# Patient Record
Sex: FEMALE | Race: WHITE | Employment: UNEMPLOYED | URBAN - METROPOLITAN AREA
[De-identification: names, ages, dates, MRNs, and addresses within clinical notes are randomized per-mention and may not be internally consistent; named-entity substitution may affect disease eponyms.]

---

## 2017-11-16 PROBLEM — Z72.0 TOBACCO ABUSE: Status: ACTIVE | Noted: 2017-11-16

## 2017-11-16 PROBLEM — F19.10 IV DRUG ABUSE (HCC): Status: ACTIVE | Noted: 2017-11-16

## 2017-11-16 PROBLEM — L02.414 ABSCESS OF LEFT FOREARM: Status: ACTIVE | Noted: 2017-11-16

## 2017-11-19 PROBLEM — A49.8: Status: ACTIVE | Noted: 2017-11-19

## 2017-11-19 PROBLEM — F19.90 IVDU (INTRAVENOUS DRUG USER): Status: ACTIVE | Noted: 2017-11-16

## 2017-11-27 NOTE — TELEPHONE ENCOUNTER
Pt is calling about getting a rx for percocet. [facility]  Pharmacy number is [phone]  Please advise.

## 2017-11-28 RX ORDER — TRAMADOL HYDROCHLORIDE 50 MG/1
50 TABLET ORAL EVERY 8 HOURS PRN
Qty: 21 TABLET | Refills: 0 | Status: SHIPPED | OUTPATIENT
Start: 2017-11-28 | End: 2018-06-19

## 2017-11-30 ENCOUNTER — OFFICE VISIT (OUTPATIENT)
Dept: ORTHOPEDIC SURGERY | Age: 42
End: 2017-11-30

## 2017-11-30 VITALS — BODY MASS INDEX: 21.66 KG/M2 | HEIGHT: 67 IN | WEIGHT: 138 LBS

## 2017-11-30 DIAGNOSIS — L02.91 ABSCESS: ICD-10-CM

## 2017-11-30 DIAGNOSIS — L03.114 CELLULITIS OF LEFT UPPER EXTREMITY: Primary | ICD-10-CM

## 2017-11-30 PROCEDURE — 99024 POSTOP FOLLOW-UP VISIT: CPT | Performed by: ORTHOPAEDIC SURGERY

## 2017-12-01 NOTE — PROGRESS NOTES
palpable. Surgical cultures:   Escherichia coli     Culture Surgical 11/17/2017  2:04 PM [address] [facility]   Heavy growth    Organism  (Abnormal) 11/17/2017  2:04 PM [address] [facility]   Bacteroides stercoris     Anaerobic Culture 11/17/2017  2:04 PM [address] [facility]   Light growth   Beta Lactamase POSITIVE. Sensitivities not routinely done. Drugs of choice are: Metronidazole,   Cefoxitin, or Piperacillin/Tazobactam.     Organism  (Abnormal) 11/17/2017  2:04 PM [facility]   Prevotella oris           IMPRESSION:  2 weeks out from left forearm abscess with skin necrosis, s/p incision and drainage, and doing well. PLAN: She can go back to normal activity with no heavy impact activities for 6 weeks. Discontinue the wound vac and start wet to dry dressings daily which were taught to her today. Continue antibiotics. The patient will come back for a follow up in 2 weeks. The patient understands that there is a chance of abscess recurrence even after surgical I&D.     Steven Sampson MD

## 2017-12-21 NOTE — COMMUNICATION BODY
DIAGNOSIS:  Left forearm abscess with skin necrosis, s/p incision and drainage with wound vac. DATE OF SURGERY:  2017. HISTORY OF PRESENT ILLNESS:  Ms. Adria Nina 43 y.o.  female who came in today for 2 weeks postoperative visit. She is an IV drug abuser. Rates pain a 6/10 VAS and starting to improve. The patient denies any significant pain in the left forearm. She has been WBAT. No numbness or tingling sensation. No fever or Chills. She grew up e-coli, Bacteroides stercoris and prevotella oris and on PO Cipro. She was initially started on Bactrim and Flagyl, but cannot tolerate them d/t GI. She was given Ultram for pain and reports that she cannot take it and want something stronger. Past Medical History:   Diagnosis Date    Asthma     Depression     [facility] dependence ([facility] [address]     Drug overdose, multiple drugs 3/17/2015    Eczema of hand 2012    ETOH abuse 2011    Hepatitis C 3/1/15    Heroin withdrawal ([facility] [address]     MRSA infection 14    Suicidal ideation        Past Surgical History:   Procedure Laterality Date    ABSCESS DRAINAGE Left 2017    I&D Left forearm deep abscess, VAC application    BREAST SURGERY       SECTION         Social History     Social History    Marital status:      Spouse name: N/A    Number of children: N/A    Years of education: N/A     Occupational History    Not on file. Social History Main Topics    Smoking status: Current Every Day Smoker     Packs/day: 0.50     Types: Cigarettes    Smokeless tobacco: Never Used      Comment: quit early Nov    Alcohol use No    Drug use:      Types: IV, Marijuana      Comment: heroin    Sexual activity: Yes     Partners: Male      Comment: pt states gram/day. Other Topics Concern    Not on file     Social History Narrative    No narrative on file       History reviewed. No pertinent family history.     Current Outpatient Prescriptions on File Prior to Visit   Medication Sig Dispense Refill    ciprofloxacin (CIPRO) 500 MG tablet Take 1 tablet by mouth every 12 hours for 10 days 20 tablet 0    famotidine (PEPCID) 20 MG tablet Take 1 tablet by mouth 2 times daily 60 tablet 3    valACYclovir (VALTREX) 1 g tablet Take 1,000 mg by mouth 2 times daily      methadone (DOLOPHINE) 10 MG tablet Take 90 mg by mouth every morning .  escitalopram (LEXAPRO) 20 MG tablet Take 20 mg by mouth      ibuprofen (ADVIL;MOTRIN) 600 MG tablet Take 1 tablet by mouth every 6 hours as needed for Pain 20 tablet 0    PROAIR  (90 BASE) MCG/ACT inhaler INHALE TWO PUFFS BY MOUTH EVERY 4 HOURS AS NEEDED 1 Inhaler 11    traMADol (ULTRAM) 50 MG tablet Take 1 tablet by mouth every 8 hours as needed for Pain . 21 tablet 0    ondansetron (ZOFRAN) 4 MG tablet Take 1 tablet by mouth daily as needed for Nausea or Vomiting 15 tablet 0    gabapentin (NEURONTIN) 600 MG tablet Take 600 mg by mouth 3 times daily as needed (pain)       No current facility-administered medications on file prior to visit. Pertinent items are noted in HPI  Review of systems reviewed from Patient History Form dated on 11/30/2017 and available in the patient's chart under the Media tab. No change noted. PHYSICAL EXAMINATION:  Ms. Bela Wong is a very pleasant 43 y.o.  female who presents today in no acute distress, awake, alert, and oriented. She is well dressed, nourished and  groomed. Patient with normal affect. Height is  5' 7\" (1.702 m), weight is 138 lb (62.6 kg), Body mass index is 21.61 kg/m². Resting respiratory rate is 16. The patient walks with no limp. The incision healing well, still open with good granulation tissue. No signs of any erythema or drainage, minimal swelling. She has no pain with the active or passive range of motion of the left elbow or wrist, good ROM. She has intact sensation distally, and she is neurovascularly intact.  No axillary lymph nodes palpable. Surgical cultures:   Escherichia coli     Culture Surgical 11/17/2017  2:04 PM [address] [facility]   Heavy growth    Organism  (Abnormal) 11/17/2017  2:04 PM [address] [facility]   Bacteroides stercoris     Anaerobic Culture 11/17/2017  2:04 PM [address] [facility]   Light growth   Beta Lactamase POSITIVE. Sensitivities not routinely done. Drugs of choice are: Metronidazole,   Cefoxitin, or Piperacillin/Tazobactam.     Organism  (Abnormal) 11/17/2017  2:04 PM [facility]   Prevotella oris           IMPRESSION:  2 weeks out from left forearm abscess with skin necrosis, s/p incision and drainage, and doing well. PLAN: She can go back to normal activity with no heavy impact activities for 6 weeks. Discontinue the wound vac and start wet to dry dressings daily which were taught to her today. Continue antibiotics. The patient will come back for a follow up in 2 weeks. The patient understands that there is a chance of abscess recurrence even after surgical I&D.     Luisa Begum MD

## 2018-06-19 PROBLEM — A60.00 RECURRENT GENITAL HERPES: Status: ACTIVE | Noted: 2018-06-19

## 2018-06-19 PROBLEM — Z71.6 ENCOUNTER FOR SMOKING CESSATION COUNSELING: Status: ACTIVE | Noted: 2017-11-16

## 2018-06-25 ENCOUNTER — HOSPITAL ENCOUNTER (OUTPATIENT)
Dept: ULTRASOUND IMAGING | Age: 43
Discharge: HOME | End: 2018-06-25
Attending: INTERNAL MEDICINE | Admitting: INTERNAL MEDICINE

## 2018-06-25 DIAGNOSIS — B18.2 HEP C W/O COMA, CHRONIC (HCC): ICD-10-CM

## 2018-06-25 LAB
A/G RATIO: 1.4 (ref 1.1–2.2)
ALBUMIN SERPL-MCNC: 4.1 G/DL (ref 3.4–5)
ALP BLD-CCNC: 74 U/L (ref 40–129)
ALT SERPL-CCNC: 230 U/L (ref 10–40)
ANION GAP SERPL CALCULATED.3IONS-SCNC: 11 MMOL/L (ref 3–16)
AST SERPL-CCNC: 127 U/L (ref 15–37)
BASOPHILS ABSOLUTE: 0 K/UL (ref 0–0.2)
BASOPHILS RELATIVE PERCENT: 0.6 %
BILIRUB SERPL-MCNC: 0.3 MG/DL (ref 0–1)
BUN BLDV-MCNC: 12 MG/DL (ref 7–20)
CALCIUM SERPL-MCNC: 9.1 MG/DL (ref 8.3–10.6)
CHLORIDE BLD-SCNC: 104 MMOL/L (ref 99–110)
CHOLESTEROL, TOTAL: 116 MG/DL (ref 0–199)
CO2: 25 MMOL/L (ref 21–32)
CREAT SERPL-MCNC: 0.7 MG/DL (ref 0.6–1.1)
EOSINOPHILS ABSOLUTE: 0 K/UL (ref 0–0.6)
EOSINOPHILS RELATIVE PERCENT: 0.1 %
GFR AFRICAN AMERICAN: >60
GFR NON-AFRICAN AMERICAN: >60
GLOBULIN: 3 G/DL
GLUCOSE BLD-MCNC: 97 MG/DL (ref 70–99)
HCT VFR BLD CALC: 41.8 % (ref 36–48)
HDLC SERPL-MCNC: 48 MG/DL (ref 40–60)
HEMOGLOBIN: 14.2 G/DL (ref 12–16)
LDL CHOLESTEROL CALCULATED: 44 MG/DL
LYMPHOCYTES ABSOLUTE: 1.7 K/UL (ref 1–5.1)
LYMPHOCYTES RELATIVE PERCENT: 27.7 %
MCH RBC QN AUTO: 33 PG (ref 26–34)
MCHC RBC AUTO-ENTMCNC: 34 G/DL (ref 31–36)
MCV RBC AUTO: 96.9 FL (ref 80–100)
MONOCYTES ABSOLUTE: 0.5 K/UL (ref 0–1.3)
MONOCYTES RELATIVE PERCENT: 7.6 %
NEUTROPHILS ABSOLUTE: 3.9 K/UL (ref 1.7–7.7)
NEUTROPHILS RELATIVE PERCENT: 64 %
PDW BLD-RTO: 13.4 % (ref 12.4–15.4)
PLATELET # BLD: 146 K/UL (ref 135–450)
PMV BLD AUTO: 8.9 FL (ref 5–10.5)
POTASSIUM SERPL-SCNC: 4.4 MMOL/L (ref 3.5–5.1)
RBC # BLD: 4.31 M/UL (ref 4–5.2)
SEDIMENTATION RATE, ERYTHROCYTE: 14 MM/HR (ref 0–20)
SODIUM BLD-SCNC: 140 MMOL/L (ref 136–145)
TOTAL PROTEIN: 7.1 G/DL (ref 6.4–8.2)
TRIGL SERPL-MCNC: 119 MG/DL (ref 0–150)
TSH SERPL DL<=0.05 MIU/L-ACNC: 2.08 UIU/ML (ref 0.27–4.2)
VLDLC SERPL CALC-MCNC: 24 MG/DL
WBC # BLD: 6.1 K/UL (ref 4–11)

## 2018-06-26 LAB
HCG TUMOR MARKER: <1 IU/L (ref 0–5)
VITAMIN D 1,25-DIHYDROXY: 43.2 PG/ML (ref 19.9–79.3)

## 2018-06-27 LAB
AFP: 2.4 UG/L
EER HCV RNA QNT PCR: ABNORMAL
HCV RNA QNT REAL-TIME PCR INTERP: DETECTED
HCV RNA, QUANTITATIVE REAL TIME PCR: 7.2 LOG IU
HEPATITIS C RNA PCR QUANT: ABNORMAL IU/ML

## 2018-08-15 LAB
HBV SURFACE AB TITR SER: <3.5 MIU/ML
HEPATITIS B SURFACE ANTIGEN INTERPRETATION: NORMAL

## 2018-08-16 LAB
HIV AG/AB: NORMAL
HIV ANTIGEN: NORMAL
HIV-1 ANTIBODY: NORMAL
HIV-2 AB: NORMAL

## 2018-08-18 LAB
EER HCV RNA QNT PCR: ABNORMAL
HCV RNA QNT REAL-TIME PCR INTERP: DETECTED
HCV RNA, QUANTITATIVE REAL TIME PCR: 7 LOG IU
HEPATITIS C RNA PCR QUANT: ABNORMAL IU/ML

## 2018-08-21 LAB — HEPATITIS C GENOTYPE: NORMAL

## 2019-05-02 ENCOUNTER — APPOINTMENT (OUTPATIENT)
Dept: GENERAL RADIOLOGY | Age: 44
End: 2019-05-02
Payer: COMMERCIAL

## 2019-05-02 ENCOUNTER — HOSPITAL ENCOUNTER (EMERGENCY)
Age: 44
Discharge: HOME | End: 2019-05-02
Attending: EMERGENCY MEDICINE
Payer: COMMERCIAL

## 2019-05-02 VITALS
RESPIRATION RATE: 19 BRPM | TEMPERATURE: 98.7 F | HEART RATE: 75 BPM | DIASTOLIC BLOOD PRESSURE: 74 MMHG | OXYGEN SATURATION: 95 % | BODY MASS INDEX: 23.58 KG/M2 | WEIGHT: 150.57 LBS | SYSTOLIC BLOOD PRESSURE: 133 MMHG

## 2019-05-02 DIAGNOSIS — J18.9 PNEUMONIA DUE TO ORGANISM: Primary | ICD-10-CM

## 2019-05-02 LAB
ANION GAP SERPL CALCULATED.3IONS-SCNC: 14 MMOL/L (ref 3–16)
BASOPHILS ABSOLUTE: 0 K/UL (ref 0–0.2)
BASOPHILS RELATIVE PERCENT: 0 %
BUN BLDV-MCNC: 5 MG/DL (ref 7–20)
CALCIUM SERPL-MCNC: 9.1 MG/DL (ref 8.3–10.6)
CHLORIDE BLD-SCNC: 101 MMOL/L (ref 99–110)
CO2: 22 MMOL/L (ref 21–32)
CREAT SERPL-MCNC: 0.8 MG/DL (ref 0.6–1.1)
EKG ATRIAL RATE: 101 BPM
EKG DIAGNOSIS: NORMAL
EKG P AXIS: 69 DEGREES
EKG P-R INTERVAL: 114 MS
EKG Q-T INTERVAL: 332 MS
EKG QRS DURATION: 70 MS
EKG QTC CALCULATION (BAZETT): 430 MS
EKG R AXIS: 62 DEGREES
EKG T AXIS: 26 DEGREES
EKG VENTRICULAR RATE: 101 BPM
EOSINOPHILS ABSOLUTE: 0 K/UL (ref 0–0.6)
EOSINOPHILS RELATIVE PERCENT: 0.2 %
GFR AFRICAN AMERICAN: >60
GFR NON-AFRICAN AMERICAN: >60
GLUCOSE BLD-MCNC: 144 MG/DL (ref 70–99)
HCT VFR BLD CALC: 38.9 % (ref 36–48)
HEMOGLOBIN: 13 G/DL (ref 12–16)
LACTIC ACID: 1.3 MMOL/L (ref 0.4–2)
LYMPHOCYTES ABSOLUTE: 0.6 K/UL (ref 1–5.1)
LYMPHOCYTES RELATIVE PERCENT: 3.8 %
MCH RBC QN AUTO: 30.6 PG (ref 26–34)
MCHC RBC AUTO-ENTMCNC: 33.4 G/DL (ref 31–36)
MCV RBC AUTO: 91.6 FL (ref 80–100)
MONOCYTES ABSOLUTE: 0.7 K/UL (ref 0–1.3)
MONOCYTES RELATIVE PERCENT: 4.2 %
NEUTROPHILS ABSOLUTE: 15.2 K/UL (ref 1.7–7.7)
NEUTROPHILS RELATIVE PERCENT: 91.8 %
PDW BLD-RTO: 15 % (ref 12.4–15.4)
PLATELET # BLD: 136 K/UL (ref 135–450)
PMV BLD AUTO: 10 FL (ref 5–10.5)
POTASSIUM SERPL-SCNC: 3.8 MMOL/L (ref 3.5–5.1)
PRO-BNP: 142 PG/ML (ref 0–124)
RBC # BLD: 4.25 M/UL (ref 4–5.2)
SODIUM BLD-SCNC: 137 MMOL/L (ref 136–145)
WBC # BLD: 16.6 K/UL (ref 4–11)

## 2019-05-02 PROCEDURE — 93005 ELECTROCARDIOGRAM TRACING: CPT | Performed by: EMERGENCY MEDICINE

## 2019-05-02 PROCEDURE — 2580000003 HC RX 258: Performed by: EMERGENCY MEDICINE

## 2019-05-02 PROCEDURE — 80048 BASIC METABOLIC PNL TOTAL CA: CPT

## 2019-05-02 PROCEDURE — 96365 THER/PROPH/DIAG IV INF INIT: CPT

## 2019-05-02 PROCEDURE — 87040 BLOOD CULTURE FOR BACTERIA: CPT

## 2019-05-02 PROCEDURE — 85025 COMPLETE CBC W/AUTO DIFF WBC: CPT

## 2019-05-02 PROCEDURE — 83605 ASSAY OF LACTIC ACID: CPT

## 2019-05-02 PROCEDURE — 93010 ELECTROCARDIOGRAM REPORT: CPT | Performed by: INTERNAL MEDICINE

## 2019-05-02 PROCEDURE — 6370000000 HC RX 637 (ALT 250 FOR IP): Performed by: EMERGENCY MEDICINE

## 2019-05-02 PROCEDURE — 6360000002 HC RX W HCPCS: Performed by: EMERGENCY MEDICINE

## 2019-05-02 PROCEDURE — 83880 ASSAY OF NATRIURETIC PEPTIDE: CPT

## 2019-05-02 PROCEDURE — 94664 DEMO&/EVAL PT USE INHALER: CPT

## 2019-05-02 PROCEDURE — 99284 EMERGENCY DEPT VISIT MOD MDM: CPT

## 2019-05-02 PROCEDURE — 71046 X-RAY EXAM CHEST 2 VIEWS: CPT

## 2019-05-02 PROCEDURE — 36415 COLL VENOUS BLD VENIPUNCTURE: CPT

## 2019-05-02 PROCEDURE — 96361 HYDRATE IV INFUSION ADD-ON: CPT

## 2019-05-02 PROCEDURE — 96375 TX/PRO/DX INJ NEW DRUG ADDON: CPT

## 2019-05-02 PROCEDURE — 94640 AIRWAY INHALATION TREATMENT: CPT

## 2019-05-02 RX ORDER — IPRATROPIUM BROMIDE AND ALBUTEROL SULFATE 2.5; .5 MG/3ML; MG/3ML
1 SOLUTION RESPIRATORY (INHALATION) EVERY 4 HOURS
Qty: 360 ML | Refills: 0 | Status: SHIPPED | OUTPATIENT
Start: 2019-05-02 | End: 2019-12-04 | Stop reason: SDUPTHER

## 2019-05-02 RX ORDER — BROMPHENIRAMINE MALEATE, PSEUDOEPHEDRINE HYDROCHLORIDE, AND DEXTROMETHORPHAN HYDROBROMIDE 2; 30; 10 MG/5ML; MG/5ML; MG/5ML
5 SYRUP ORAL 4 TIMES DAILY PRN
Qty: 150 ML | Refills: 0 | Status: SHIPPED | OUTPATIENT
Start: 2019-05-02 | End: 2019-06-28

## 2019-05-02 RX ORDER — 0.9 % SODIUM CHLORIDE 0.9 %
1000 INTRAVENOUS SOLUTION INTRAVENOUS ONCE
Status: COMPLETED | OUTPATIENT
Start: 2019-05-02 | End: 2019-05-02

## 2019-05-02 RX ORDER — LEVOFLOXACIN 500 MG/1
500 TABLET, FILM COATED ORAL DAILY
Qty: 10 TABLET | Refills: 0 | Status: SHIPPED | OUTPATIENT
Start: 2019-05-02 | End: 2019-05-12

## 2019-05-02 RX ORDER — ONDANSETRON 4 MG/1
4 TABLET, ORALLY DISINTEGRATING ORAL ONCE
Status: COMPLETED | OUTPATIENT
Start: 2019-05-02 | End: 2019-05-02

## 2019-05-02 RX ORDER — PREDNISONE 20 MG/1
60 TABLET ORAL ONCE
Status: COMPLETED | OUTPATIENT
Start: 2019-05-02 | End: 2019-05-02

## 2019-05-02 RX ORDER — PREDNISONE 20 MG/1
TABLET ORAL
Qty: 27 TABLET | Refills: 0 | Status: SHIPPED | OUTPATIENT
Start: 2019-05-02 | End: 2019-06-28

## 2019-05-02 RX ORDER — ONDANSETRON 2 MG/ML
4 INJECTION INTRAMUSCULAR; INTRAVENOUS ONCE
Status: COMPLETED | OUTPATIENT
Start: 2019-05-02 | End: 2019-05-02

## 2019-05-02 RX ORDER — IPRATROPIUM BROMIDE AND ALBUTEROL SULFATE 2.5; .5 MG/3ML; MG/3ML
1 SOLUTION RESPIRATORY (INHALATION) ONCE
Status: COMPLETED | OUTPATIENT
Start: 2019-05-02 | End: 2019-05-02

## 2019-05-02 RX ORDER — LEVOFLOXACIN 5 MG/ML
500 INJECTION, SOLUTION INTRAVENOUS ONCE
Status: COMPLETED | OUTPATIENT
Start: 2019-05-02 | End: 2019-05-02

## 2019-05-02 RX ORDER — ONDANSETRON 4 MG/1
4 TABLET, ORALLY DISINTEGRATING ORAL EVERY 8 HOURS PRN
Qty: 20 TABLET | Refills: 0 | Status: SHIPPED | OUTPATIENT
Start: 2019-05-02 | End: 2019-06-28

## 2019-05-02 RX ADMIN — ONDANSETRON 4 MG: 4 TABLET, ORALLY DISINTEGRATING ORAL at 06:07

## 2019-05-02 RX ADMIN — ONDANSETRON 4 MG: 2 INJECTION INTRAMUSCULAR; INTRAVENOUS at 03:00

## 2019-05-02 RX ADMIN — PREDNISONE 60 MG: 20 TABLET ORAL at 03:13

## 2019-05-02 RX ADMIN — LEVOFLOXACIN 500 MG: 5 INJECTION, SOLUTION INTRAVENOUS at 05:54

## 2019-05-02 RX ADMIN — IPRATROPIUM BROMIDE AND ALBUTEROL SULFATE 1 AMPULE: .5; 3 SOLUTION RESPIRATORY (INHALATION) at 02:58

## 2019-05-02 RX ADMIN — IBUPROFEN 600 MG: 400 TABLET ORAL at 03:17

## 2019-05-02 RX ADMIN — SODIUM CHLORIDE 1000 ML: 9 INJECTION, SOLUTION INTRAVENOUS at 03:00

## 2019-05-02 ASSESSMENT — PAIN DESCRIPTION - FREQUENCY: FREQUENCY: CONTINUOUS

## 2019-05-02 ASSESSMENT — PAIN SCALES - GENERAL
PAINLEVEL_OUTOF10: 9
PAINLEVEL_OUTOF10: 10

## 2019-05-02 ASSESSMENT — PAIN DESCRIPTION - LOCATION: LOCATION: BACK

## 2019-05-02 ASSESSMENT — PAIN DESCRIPTION - PAIN TYPE: TYPE: ACUTE PAIN

## 2019-05-02 ASSESSMENT — PAIN DESCRIPTION - DESCRIPTORS: DESCRIPTORS: ACHING

## 2019-05-02 NOTE — ED PROVIDER NOTES
Triage Chief Complaint:    Shortness of Breath (started last night congestion, sob, runny nose)    [place]:  Vanessa Cazares is a 37 y.o. female that presents to the emergency Department with complaints of having shortness of breath, nasal congestion, and cough. The patient states that her symptoms began over the course of last 24 hours. Patient states she has been feeling increasingly sick. Patient denies back pain, or muscular weakness, but states that she has been trying to stay hydrated and finds it difficult. The patient states that she is becoming dehydrated, since she does not drink enough fluids. Patient states there is no nausea vomiting, however her appetite is markedly decreased. Patient states she feels very wheezy, and hasn't difficulty breathing, and states that her normal medications for asthma are not helpful. ROS:  At least 10 systems reviewed and otherwise acutely negative except as in the [address] Past Medical History:   Diagnosis Date    Asthma     Depression     Drug dependence ([facility] [address]     Drug overdose, multiple drugs 3/17/2015    Eczema of hand 2012    ETOH abuse 2011    Hepatitis C 3/1/15    Heroin withdrawal ([facility] [address]     MRSA infection 14    Suicidal ideation      Past Surgical History:   Procedure Laterality Date    ABSCESS DRAINAGE Left 2017    I&D Left forearm deep abscess, VAC application    BREAST SURGERY       SECTION       No family history on file.   Social History     Socioeconomic History    Marital status:      Spouse name: Not on file    Number of children: Not on file    Years of education: Not on file    Highest education level: Not on file   Occupational History    Not on file   Social Needs    Financial resource strain: Not on file    Food insecurity:     Worry: Not on file     Inability: Not on file    Transportation needs:     Medical: Not on file     Non-medical: Not on file   Tobacco Use    Smoking status: Current Every Day Smoker     Packs/day: 0.50     Types: Cigarettes    Smokeless tobacco: Never Used    Tobacco comment: quit early Nov   Substance and Sexual Activity    Alcohol use: No     Alcohol/week: 0.5 oz     Types: 1 Standard drinks or equivalent per week    Drug use: Yes     Types: IV, Marijuana     Comment: heroin    Sexual activity: Yes     Partners: Male     Comment: pt states gram/day.    Lifestyle    Physical activity:     Days per week: Not on file     Minutes per session: Not on file    Stress: Not on file   Relationships    Social connections:     Talks on phone: Not on file     Gets together: Not on file     Attends Taoism service: Not on file     Active member of club or organization: Not on file     Attends meetings of clubs or organizations: Not on file     Relationship status: Not on file    Intimate partner violence:     Fear of current or ex partner: Not on file     Emotionally abused: Not on file     Physically abused: Not on file     Forced sexual activity: Not on file   Other Topics Concern    Not on file   Social History Narrative    Not on file     Current Facility-Administered Medications   Medication Dose Route Frequency Provider Last Rate Last Dose    levofloxacin (LEVAQUIN) 500 MG/100ML infusion 500 mg  500 mg Intravenous Once Denise Liz  mL/hr at 05/02/19 0554 500 mg at 05/02/19 0554    ondansetron (ZOFRAN-ODT) disintegrating tablet 4 mg  4 mg Oral Once Denise Liz MD         Current Outpatient Medications   Medication Sig Dispense Refill    levofloxacin (LEVAQUIN) 500 MG tablet Take 1 tablet by mouth daily for 10 days 10 tablet 0    brompheniramine-pseudoephedrine-DM (BROMFED DM) 2-30-10 MG/5ML syrup Take 5 mLs by mouth 4 times daily as needed for Congestion or Cough 150 mL 0    ipratropium-albuterol (DUONEB) 0.5-2.5 (3) MG/3ML SOLN nebulizer solution Inhale 3 mLs into the lungs every 4 hours 360 mL 0    predniSONE (DELTASONE) 20 MG tablet Take midline. HEART: RRR. LUNGS: Decreased breath sounds bilaterally, with slight expiratory wheezes  ABDOMEN: Soft. Non-tender. No guarding or rebound. EXTREMITIES: No acute deformities. SKIN: Warm and dry. NEUROLOGICAL: No gross facial drooping. Moves all 4 extremities spontaneously.     Physical Exam    I have reviewed and interpreted all of the currently availablelab results from this visit (if applicable):  Results for orders placed or performed during the hospital encounter of 05/02/19   CBC Auto Differential   Result Value Ref Range    WBC 16.6 (H) 4.0 - 11.0 K/uL    RBC 4.25 4.00 - 5.20 M/uL    Hemoglobin 13.0 12.0 - 16.0 g/dL    Hematocrit 38.9 36.0 - 48.0 %    MCV 91.6 80.0 - 100.0 fL    MCH 30.6 26.0 - 34.0 pg    MCHC 33.4 31.0 - 36.0 g/dL    RDW 15.0 12.4 - 15.4 %    Platelets 445 204 - 719 K/uL    MPV 10.0 5.0 - 10.5 fL    Neutrophils % 91.8 %    Lymphocytes % 3.8 %    Monocytes % 4.2 %    Eosinophils % 0.2 %    Basophils % 0.0 %    Neutrophils # 15.2 (H) 1.7 - 7.7 K/uL    Lymphocytes # 0.6 (L) 1.0 - 5.1 K/uL    Monocytes # 0.7 0.0 - 1.3 K/uL    Eosinophils # 0.0 0.0 - 0.6 K/uL    Basophils # 0.0 0.0 - 0.2 K/uL   Basic Metabolic Panel   Result Value Ref Range    Sodium 137 136 - 145 mmol/L    Potassium 3.8 3.5 - 5.1 mmol/L    Chloride 101 99 - 110 mmol/L    CO2 22 21 - 32 mmol/L    Anion Gap 14 3 - 16    Glucose 144 (H) 70 - 99 mg/dL    BUN 5 (L) 7 - 20 mg/dL    CREATININE 0.8 0.6 - 1.1 mg/dL    GFR Non-African American >60 >60    GFR African American >60 >60    Calcium 9.1 8.3 - 10.6 mg/dL   Lactic Acid, Plasma   Result Value Ref Range    Lactic Acid 1.3 0.4 - 2.0 mmol/L   Brain Natriuretic Peptide   Result Value Ref Range    Pro- (H) 0 - 124 pg/mL        Radiographs (if obtained):  [] The following radiograph was interpreted by myself in the absence of a radiologist:  [x] Radiologist's Report Reviewed:  XR CHEST STANDARD (2 VW)   Final Result   Bilateral airspace disease either due to early pneumonia or edema. EKG (if obtained): (All EKG's are interpreted by myself in theabsence of a cardiologist)  Normal sinus rhythm, normal QRS, no STEMI. Procedures    MDM:  After my initial evaluation, the patient does have wheezing, and decreased breath sounds bilaterally. Patient's chest x-ray, which was done here, demonstrates what appears be bilateral pneumonia. Is not hypoxic, and I do feel that the patient can be started on a IV antibiotics. The patient was given a dose of Levaquin IV. The patient also received a breathing treatment, and steroids, and was markedly improved afterwards. The patient states that she does not have a nebulizer at home, but her children do, and I will provide her with medications for the nebulizer machine. The patient will be started on steroids, and Levaquin for home as well. Patient was encouraged follow-up with primary care doctor, or return here if her symptoms worsen. I do feel the patient stable for discharge home, because she is not hypoxic. Clinical Impression:  1.  Pneumonia due to organism      (Please note that portions of this note Avila Mate been completed with a voice recognition program. Efforts were made to edit the dictations but occasionally words are mis-transcribed.)    MD Alex Loving MD  05/02/19 [id number]

## 2019-05-02 NOTE — ED NOTES
States feeling sick and anxious for past 2 days. States boyfriend wont let her sleep which is making her more anxious. States feels dehydrated. States feels congested and feels like heart is racing.      Wilfred Shelton RN  05/02/19 [id number]

## 2019-05-07 LAB — BLOOD CULTURE, ROUTINE: NORMAL

## 2019-12-10 ENCOUNTER — TELEPHONE (OUTPATIENT)
Dept: GYNECOLOGY | Age: 44
End: 2019-12-10

## 2020-11-25 ENCOUNTER — HOSPITAL ENCOUNTER (OUTPATIENT)
Dept: ULTRASOUND IMAGING | Age: 45
Discharge: HOME | End: 2020-11-25
Payer: COMMERCIAL

## 2020-11-25 PROCEDURE — 76700 US EXAM ABDOM COMPLETE: CPT

## 2021-03-05 ENCOUNTER — HOSPITAL ENCOUNTER (OUTPATIENT)
Dept: ULTRASOUND IMAGING | Age: 46
Discharge: HOME | End: 2021-03-05
Payer: COMMERCIAL

## 2021-03-05 DIAGNOSIS — K74.60 HEPATIC CIRRHOSIS, UNSPECIFIED HEPATIC CIRRHOSIS TYPE, UNSPECIFIED WHETHER ASCITES PRESENT (HCC): ICD-10-CM

## 2021-03-05 PROCEDURE — 76700 US EXAM ABDOM COMPLETE: CPT

## 2021-04-26 ENCOUNTER — HOSPITAL ENCOUNTER (EMERGENCY)
Age: 46
Discharge: HOME | End: 2021-04-26
Attending: EMERGENCY MEDICINE
Payer: COMMERCIAL

## 2021-04-26 VITALS
OXYGEN SATURATION: 100 % | TEMPERATURE: 98.8 F | DIASTOLIC BLOOD PRESSURE: 87 MMHG | WEIGHT: 153 LBS | HEART RATE: 89 BPM | BODY MASS INDEX: 23.96 KG/M2 | SYSTOLIC BLOOD PRESSURE: 139 MMHG | RESPIRATION RATE: 16 BRPM

## 2021-04-26 DIAGNOSIS — R11.0 NAUSEA: Primary | ICD-10-CM

## 2021-04-26 DIAGNOSIS — H92.02 LEFT EAR PAIN: ICD-10-CM

## 2021-04-26 PROCEDURE — 99284 EMERGENCY DEPT VISIT MOD MDM: CPT

## 2021-04-26 PROCEDURE — 6370000000 HC RX 637 (ALT 250 FOR IP): Performed by: EMERGENCY MEDICINE

## 2021-04-26 RX ORDER — ONDANSETRON 2 MG/ML
4 INJECTION INTRAMUSCULAR; INTRAVENOUS ONCE
Status: DISCONTINUED | OUTPATIENT
Start: 2021-04-26 | End: 2021-04-26

## 2021-04-26 RX ORDER — ONDANSETRON 4 MG/1
4 TABLET, ORALLY DISINTEGRATING ORAL ONCE
Status: COMPLETED | OUTPATIENT
Start: 2021-04-26 | End: 2021-04-26

## 2021-04-26 RX ORDER — ONDANSETRON 4 MG/1
4 TABLET, ORALLY DISINTEGRATING ORAL EVERY 8 HOURS PRN
Qty: 20 TABLET | Refills: 0 | Status: SHIPPED | OUTPATIENT
Start: 2021-04-26 | End: 2021-11-18

## 2021-04-26 RX ORDER — CETIRIZINE HYDROCHLORIDE, PSEUDOEPHEDRINE HYDROCHLORIDE 5; 120 MG/1; MG/1
1 TABLET, FILM COATED, EXTENDED RELEASE ORAL 2 TIMES DAILY
Qty: 180 TABLET | Refills: 1 | Status: SHIPPED | OUTPATIENT
Start: 2021-04-26 | End: 2021-05-26

## 2021-04-26 RX ADMIN — ONDANSETRON 4 MG: 4 TABLET, ORALLY DISINTEGRATING ORAL at 03:13

## 2021-04-26 ASSESSMENT — PAIN DESCRIPTION - DESCRIPTORS: DESCRIPTORS: ACHING

## 2021-04-26 ASSESSMENT — PAIN SCALES - GENERAL: PAINLEVEL_OUTOF10: 6

## 2021-04-26 ASSESSMENT — PAIN DESCRIPTION - PAIN TYPE: TYPE: ACUTE PAIN

## 2021-04-26 NOTE — ED TRIAGE NOTES
Pt states she has foreign body in ear, states pain in left ear for one week. Pt also reports some nausea for one day only.

## 2021-04-27 ASSESSMENT — ENCOUNTER SYMPTOMS
SORE THROAT: 0
EYE PAIN: 0
EYE DISCHARGE: 0

## 2021-04-27 NOTE — ED PROVIDER NOTES
abscess, VAC application    BREAST SURGERY       SECTION           CURRENT MEDICATIONS       Discharge Medication List as of 2021  3:13 AM      CONTINUE these medications which have NOT CHANGED    Details   gabapentin (NEURONTIN) 600 MG tablet TAKE ONE TABLET BY MOUTH THREE TIMES A DAY, Disp-90 tablet, R-0Normal      albuterol sulfate  (90 Base) MCG/ACT inhaler INHALE TWO PUFFS BY MOUTH EVERY 4 HOURS AS NEEDED, Disp-18 g, R-0Normal      !! ondansetron (ZOFRAN) 4 MG tablet Take 1 tablet by mouth 3 times daily as needed for Nausea or Vomiting, Disp-30 tablet, R-0Normal      !! omeprazole (PRILOSEC) 40 MG delayed release capsule Take 1 capsule by mouth every morning (before breakfast), Disp-30 capsule, R-5Normal      !! cariprazine hcl (VRAYLAR) 1.5 MG capsule Take 1 capsule by mouth daily, Disp-30 capsule, R-2Normal      !! furosemide (LASIX) 20 MG tablet Take 1 tablet by mouth 2 times daily, Disp-60 tablet, R-3Normal      tiZANidine (ZANAFLEX) 4 MG tablet TAKE ONE TABLET BY MOUTH THREE TIMES A DAY, Disp-270 tablet, R-1Normal      !! furosemide (LASIX) 20 MG tablet TAKE ONE TABLET BY MOUTH TWICE A DAY, Disp-180 tablet, R-2Normal      cetirizine (ZYRTEC ALLERGY) 10 MG tablet Take 1 tablet by mouth daily, Disp-10 tablet, R-0Normal      !! ondansetron (ZOFRAN) 4 MG tablet Take 1 tablet by mouth 3 times daily as needed for Nausea or Vomiting, Disp-15 tablet, R-0Normal      !! cariprazine hcl (VRAYLAR) 1.5 MG capsule Take 1 capsule by mouth daily, Disp-30 capsule,R-0Normal      escitalopram (LEXAPRO) 20 MG tablet Take 1 tablet by mouth daily, Disp-30 tablet,R-3Normal      mometasone-formoterol (DULERA) 100-5 MCG/ACT inhaler INHALE TWO PUFFS BY MOUTH TWICE A DAY, Disp-13 g,R-2Normal      valACYclovir (VALTREX) 500 MG tablet Po daily, Disp-30 tablet,R-2Normal      !! omeprazole (PRILOSEC) 20 MG delayed release capsule Take 1 capsule by mouth daily, Disp-30 capsule,R-3Normal      ipratropium-albuterol (DUONEB) 0.5-2.5 (3) MG/3ML SOLN nebulizer solution Inhale 3 mLs into the lungs every 4 hours, Disp-360 mL,R-0Normal      methadone (DOLOPHINE) 10 MG tablet Take 90 mg by mouth every morning . Historical Med       !! - Potential duplicate medications found. Please discuss with provider. Bactrim [sulfamethoxazole-trimethoprim] and Flagyl [metronidazole]    FAMILY HISTORY     No family history on file. SOCIAL HISTORY       Social History     Socioeconomic History    Marital status:      Spouse name: Not on file    Number of children: Not on file    Years of education: Not on file    Highest education level: Not on file   Occupational History    Not on file   Social Needs    Financial resource strain: Not on file    Food insecurity     Worry: Not on file     Inability: Not on file    Transportation needs     Medical: Not on file     Non-medical: Not on file   Tobacco Use    Smoking status: Current Every Day Smoker     Packs/day: 0.50     Types: Cigarettes    Smokeless tobacco: Never Used    Tobacco comment: quit early Nov   Substance and Sexual Activity    Alcohol use: No     Alcohol/week: 0.8 standard drinks     Types: 1 Standard drinks or equivalent per week    Drug use: Yes     Types: IV, Marijuana     Comment: heroin    Sexual activity: Yes     Partners: Male     Comment: pt states gram/day.    Lifestyle    Physical activity     Days per week: Not on file     Minutes per session: Not on file    Stress: Not on file   Relationships    Social connections     Talks on phone: Not on file     Gets together: Not on file     Attends Holiness service: Not on file     Active member of club or organization: Not on file     Attends meetings of clubs or organizations: Not on file     Relationship status: Not on file    Intimate partner violence     Fear of current or ex partner: Not on file     Emotionally abused: Not on file     Physically abused: Not on file     Forced sexual activity: Not on file   Other Topics Concern    Not on file   Social History Narrative    Not on file       SCREENINGS    Barrett Coma Scale  Eye Opening: Spontaneous  Best Verbal Response: Oriented  Best Motor Response: Obeys commands  Barrett Coma Scale Score: 15        PHYSICAL EXAM    (up to 7 for level 4, 8 or more for level 5)     ED Triage Vitals [04/26/21 0252]   BP Temp Temp Source Pulse Resp SpO2 Height Weight   139/87 98.8 °F (37.1 °C) Oral 89 16 100 % -- 153 lb (69.4 kg)       Physical Exam  Vitals signs and nursing note reviewed. Constitutional:       Appearance: She is well-developed. HENT:      Head: Normocephalic and atraumatic. Right Ear: Tympanic membrane and ear canal normal.      Left Ear: Tympanic membrane and ear canal normal.   Eyes:      Conjunctiva/sclera: Conjunctivae normal.      Pupils: Pupils are equal, round, and reactive to light. Neck:      Musculoskeletal: Normal range of motion. Trachea: No tracheal deviation. Cardiovascular:      Rate and Rhythm: Normal rate and regular rhythm. Heart sounds: Normal heart sounds. Pulmonary:      Effort: Pulmonary effort is normal.      Breath sounds: Normal breath sounds. Abdominal:      General: There is no distension. Palpations: Abdomen is soft. Tenderness: There is no abdominal tenderness. Musculoskeletal: Normal range of motion. Skin:     General: Skin is warm and dry. Neurological:      Mental Status: She is alert and oriented to person, place, and time.          RESULTS     EKG: All EKG's are interpreted by the Emergency Department Physician who either signs or Co-signsthis chart in the absence of a cardiologist.      RADIOLOGY:   Non-plain filmimages such as CT, Ultrasound and MRI are read by the radiologist. Plain radiographic images are visualized and preliminarily interpreted by the emergency physician with the below findings:      Interpretation per the Radiologist below, if available at the time ofthis note:    No orders to display         ED BEDSIDE ULTRASOUND:   Performed by ED Physician - none    LABS:  Labs Reviewed - No data to display    All other labs were within normal range or not returned as of this dictation. EMERGENCY DEPARTMENT COURSE and DIFFERENTIAL DIAGNOSIS/MDM:   Vitals:    Vitals:    04/26/21 0252   BP: 139/87   Pulse: 89   Resp: 16   Temp: 98.8 °F (37.1 °C)   TempSrc: Oral   SpO2: 100%   Weight: 153 lb (69.4 kg)       Patient was given thefollowing medications:  Medications   ondansetron (ZOFRAN-ODT) disintegrating tablet 4 mg (4 mg Oral Given 4/26/21 0313)       ED COURSE & MEDICAL DECISION MAKING    Pertinent Labs & Imaging studies reviewed. (See chart for details)   -  Patient seen and evaluated in the emergency department. -  Triage and nursing notes reviewed and incorporated. -  Old chart records reviewed and incorporated. -  Differential diagnosis includes: Differential diagnoses: Mastoiditis, Auricular cellulitis, Malignant otitis externa, Otitis media, Subarachnoid hemorrhage, Odontogenic infection, TMJ syndrome, other.    -  Work-up included:  See above  -  ED treatment included: See above  -  Results discussed with patient. Patient presents ED for concern of possible foreign body in left ear. Patient seems clear bilaterally and external canals are clear. Patient is complains of discomfort around the angle of the jaw and she may have some eustachian dysfunction. Provided with antiemetics and decongestions. Patient feels improved on reevaluation. Symptomatic treatment with expectant management discussed with the patient and they and/or family memebers present are amenable to treatment plan and outpatient follow-up. Strict return precautions were discussed with the patient and those present. They demonstrated understanding of when to return to the emergency department for new or worsening symptoms. .  The patient is agreeable with plan of care and disposition. REASSESSMENT          CRITICAL CARE TIME   Total Critical Care time was 0 minutes, excluding separatelyreportable procedures. There was a high probability ofclinically significant/life threatening deterioration in the patient's condition which required my urgent intervention. CONSULTS:  None    PROCEDURES:  Unless otherwise noted below, none     Procedures    FINAL IMPRESSION      1. Nausea    2.  Left ear pain          DISPOSITION/PLAN   DISPOSITION Decision To Discharge 04/26/2021 03:03:35 AM      PATIENT REFERREDTO:  Ck Altman MD  [address]  [phone]    Schedule an appointment as soon as possible for a visit   As needed      DISCHARGEMEDICATIONS:  Discharge Medication List as of 4/26/2021  3:13 AM      START taking these medications    Details   ondansetron (ZOFRAN ODT) 4 MG disintegrating tablet Take 1 tablet by mouth every 8 hours as needed for Nausea, Disp-20 tablet, R-0Normal      cetirizine-psuedoephedrine (ZYRTEC-D) 5-120 MG per extended release tablet Take 1 tablet by mouth 2 times daily, Disp-180 tablet, R-1Normal                (Please note that portions of this note were completed with a voice recognition program.  Efforts were made to edit the dictations but occasionally words are mis-transcribed.)    Sami Cain MD (electronically signed)  Attending Emergency Physician          Sami Cain MD  04/27/21 [id number]

## 2022-01-17 DIAGNOSIS — J45.909 MODERATE ASTHMA WITHOUT COMPLICATION, UNSPECIFIED WHETHER PERSISTENT: ICD-10-CM

## 2022-01-17 LAB
A/G RATIO: 1.5 (ref 1.1–2.2)
ALBUMIN SERPL-MCNC: 4.1 G/DL (ref 3.4–5)
ALP BLD-CCNC: 172 U/L (ref 40–129)
ALT SERPL-CCNC: 71 U/L (ref 10–40)
ANION GAP SERPL CALCULATED.3IONS-SCNC: 14 MMOL/L (ref 3–16)
AST SERPL-CCNC: 118 U/L (ref 15–37)
BASOPHILS ABSOLUTE: 0 K/UL (ref 0–0.2)
BASOPHILS RELATIVE PERCENT: 0.9 %
BILIRUB SERPL-MCNC: 0.7 MG/DL (ref 0–1)
BUN BLDV-MCNC: 6 MG/DL (ref 7–20)
CALCIUM SERPL-MCNC: 9.3 MG/DL (ref 8.3–10.6)
CHLORIDE BLD-SCNC: 100 MMOL/L (ref 99–110)
CHOLESTEROL, TOTAL: 141 MG/DL (ref 0–199)
CO2: 24 MMOL/L (ref 21–32)
CREAT SERPL-MCNC: 0.7 MG/DL (ref 0.6–1.1)
EOSINOPHILS ABSOLUTE: 0 K/UL (ref 0–0.6)
EOSINOPHILS RELATIVE PERCENT: 1.3 %
FOLATE: 5.75 NG/ML (ref 4.78–24.2)
GFR AFRICAN AMERICAN: >60
GFR NON-AFRICAN AMERICAN: >60
GLUCOSE BLD-MCNC: 141 MG/DL (ref 70–99)
HCT VFR BLD CALC: 37.7 % (ref 36–48)
HDLC SERPL-MCNC: 43 MG/DL (ref 40–60)
HEMOGLOBIN: 12.2 G/DL (ref 12–16)
LDL CHOLESTEROL CALCULATED: 79 MG/DL
LYMPHOCYTES ABSOLUTE: 0.9 K/UL (ref 1–5.1)
LYMPHOCYTES RELATIVE PERCENT: 29.2 %
MCH RBC QN AUTO: 29.7 PG (ref 26–34)
MCHC RBC AUTO-ENTMCNC: 32.4 G/DL (ref 31–36)
MCV RBC AUTO: 91.4 FL (ref 80–100)
MONOCYTES ABSOLUTE: 0.3 K/UL (ref 0–1.3)
MONOCYTES RELATIVE PERCENT: 8 %
NEUTROPHILS ABSOLUTE: 2 K/UL (ref 1.7–7.7)
NEUTROPHILS RELATIVE PERCENT: 60.6 %
PDW BLD-RTO: 19.3 % (ref 12.4–15.4)
PLATELET # BLD: 54 K/UL (ref 135–450)
PLATELET SLIDE REVIEW: ABNORMAL
PMV BLD AUTO: 8.7 FL (ref 5–10.5)
POTASSIUM SERPL-SCNC: 4.2 MMOL/L (ref 3.5–5.1)
RBC # BLD: 4.12 M/UL (ref 4–5.2)
REASON FOR REJECTION: NORMAL
REJECTED TEST: NORMAL
SLIDE REVIEW: ABNORMAL
SODIUM BLD-SCNC: 138 MMOL/L (ref 136–145)
TOTAL PROTEIN: 6.9 G/DL (ref 6.4–8.2)
TRIGL SERPL-MCNC: 97 MG/DL (ref 0–150)
TSH SERPL DL<=0.05 MIU/L-ACNC: 7.38 UIU/ML (ref 0.27–4.2)
VITAMIN B-12: 655 PG/ML (ref 211–911)
VITAMIN D 25-HYDROXY: 22.1 NG/ML
VLDLC SERPL CALC-MCNC: 19 MG/DL
WBC # BLD: 3.2 K/UL (ref 4–11)

## 2022-01-18 LAB
ESTIMATED AVERAGE GLUCOSE: 93.9 MG/DL
HBA1C MFR BLD: 4.9 %

## 2022-04-13 ENCOUNTER — APPOINTMENT (OUTPATIENT)
Dept: GENERAL RADIOLOGY | Age: 47
End: 2022-04-13
Payer: COMMERCIAL

## 2022-04-13 ENCOUNTER — HOSPITAL ENCOUNTER (EMERGENCY)
Age: 47
Discharge: HOME | End: 2022-04-13
Payer: COMMERCIAL

## 2022-04-13 VITALS
HEART RATE: 104 BPM | HEIGHT: 67 IN | OXYGEN SATURATION: 95 % | DIASTOLIC BLOOD PRESSURE: 80 MMHG | SYSTOLIC BLOOD PRESSURE: 145 MMHG | BODY MASS INDEX: 26.06 KG/M2 | TEMPERATURE: 98.6 F | RESPIRATION RATE: 22 BRPM | WEIGHT: 166.01 LBS

## 2022-04-13 DIAGNOSIS — S92.011A CLOSED DISPLACED FRACTURE OF BODY OF RIGHT CALCANEUS, INITIAL ENCOUNTER: Primary | ICD-10-CM

## 2022-04-13 PROCEDURE — 73630 X-RAY EXAM OF FOOT: CPT

## 2022-04-13 PROCEDURE — 73610 X-RAY EXAM OF ANKLE: CPT

## 2022-04-13 PROCEDURE — 6370000000 HC RX 637 (ALT 250 FOR IP): Performed by: PHYSICIAN ASSISTANT

## 2022-04-13 PROCEDURE — 29515 APPLICATION SHORT LEG SPLINT: CPT

## 2022-04-13 PROCEDURE — 99285 EMERGENCY DEPT VISIT HI MDM: CPT

## 2022-04-13 RX ORDER — ASPIRIN 81 MG/1
81 TABLET, CHEWABLE ORAL 2 TIMES DAILY
Qty: 30 TABLET | Refills: 0 | Status: SHIPPED | OUTPATIENT
Start: 2022-04-13 | End: 2022-07-01 | Stop reason: SDUPTHER

## 2022-04-13 RX ORDER — OXYCODONE HYDROCHLORIDE AND ACETAMINOPHEN 5; 325 MG/1; MG/1
1 TABLET ORAL ONCE
Status: COMPLETED | OUTPATIENT
Start: 2022-04-13 | End: 2022-04-13

## 2022-04-13 RX ORDER — OXYCODONE HYDROCHLORIDE AND ACETAMINOPHEN 5; 325 MG/1; MG/1
1 TABLET ORAL EVERY 6 HOURS PRN
Qty: 12 TABLET | Refills: 0 | Status: SHIPPED | OUTPATIENT
Start: 2022-04-13 | End: 2022-04-16

## 2022-04-13 RX ADMIN — OXYCODONE AND ACETAMINOPHEN 1 TABLET: 5; 325 TABLET ORAL at 07:59

## 2022-04-13 ASSESSMENT — PAIN SCALES - GENERAL
PAINLEVEL_OUTOF10: 10
PAINLEVEL_OUTOF10: 9
PAINLEVEL_OUTOF10: 10

## 2022-04-13 ASSESSMENT — PAIN DESCRIPTION - DESCRIPTORS
DESCRIPTORS: PATIENT UNABLE TO DESCRIBE
DESCRIPTORS: PATIENT UNABLE TO DESCRIBE

## 2022-04-13 ASSESSMENT — PAIN - FUNCTIONAL ASSESSMENT: PAIN_FUNCTIONAL_ASSESSMENT: 0-10

## 2022-04-13 ASSESSMENT — PAIN DESCRIPTION - ORIENTATION
ORIENTATION: RIGHT
ORIENTATION: RIGHT

## 2022-04-13 ASSESSMENT — PAIN DESCRIPTION - LOCATION
LOCATION: FOOT
LOCATION: ANKLE

## 2022-04-13 ASSESSMENT — PAIN DESCRIPTION - PAIN TYPE: TYPE: ACUTE PAIN

## 2022-04-13 NOTE — ED PROVIDER NOTES
[facility]        Pt Name: Amina Garcia  MRN: [id number]  [place] 1975  Date of evaluation: 4/13/2022  Provider: COLBY Mendez  PCP: Leticia Ford MD  Note Started: 7:33 AM EDT     The ED Attending Physician was available for consultation but did not see or evaluate this patient. CHIEF COMPLAINT       Chief Complaint   Patient presents with    Foot Swelling     PT c/o right foot pain and swelling after a trip and fall off of a retaining wall. Denies hitting head or LOC.  Foot Pain       HISTORY OF PRESENT ILLNESS   (Location, Timing/Onset, Context/Setting, Quality, Duration, Modifying Factors, Severity, Associated Signs and Symptoms)  Note limiting factors. Chief Complaint: Right foot pain and swelling    Tish Cazares is a 55 y.o. female who presents with complaint of severe pain, swelling and bruising, in the right foot. She says she fell off a retaining fall, perhaps 6 feet high, last night while wearing sneakers, landing feet first.  She says she felt immediate pain in her right heel area and the pain shot up her lower leg. She says she has not been able to put any weight on the foot since then. Denies any cuts or bleeding. Says she thought the pain might get better overnight but it did not, and there is a lot of bruising. Denies numbness. Says she can move the toes but it worsens the pain. Denies any prior history of fracture or surgery in the area. Denies any chance of pregnancy. Nursing Notes were all reviewed and agreed with or any disagreements were addressed in the HPI. REVIEW OF SYSTEMS    (2-9 systems for level 4, 10 or more for level 5)     Review of Systems    Positives and pertinent negatives as per HPI.      PAST MEDICAL HISTORY     Past Medical History:   Diagnosis Date    Asthma     Depression     Drug dependence [facility] [address]     Drug overdose, multiple drugs 3/17/2015    Eczema of hand 2012    ETOH abuse 2011    Hepatitis C 3/1/15    Heroin withdrawal ([facility] [address]     MRSA infection 14    Suicidal ideation        SURGICAL HISTORY     Past Surgical History:   Procedure Laterality Date    ABSCESS DRAINAGE Left 2017    I&D Left forearm deep abscess, VAC application    BREAST SURGERY       SECTION         CURRENTMEDICATIONS       Discharge Medication List as of 2022  9:41 AM      CONTINUE these medications which have NOT CHANGED    Details   gabapentin (NEURONTIN) 600 MG tablet TAKE ONE TABLET BY MOUTH THREE TIMES A DAY, Disp-90 tablet, R-0Normal      tiZANidine (ZANAFLEX) 4 MG tablet TAKE ONE TABLET BY MOUTH THREE TIMES A DAY, Disp-270 tablet, R-1Normal      valACYclovir (VALTREX) 500 MG tablet Po daily, Disp-30 tablet, R-2Normal      acetaminophen (TYLENOL) 500 MG tablet Take 1 tablet by mouth 3 times daily as needed for Pain, Disp-90 tablet, R-5, DAWNormal      escitalopram (LEXAPRO) 20 MG tablet Take 1 tablet by mouth daily, Disp-30 tablet, R-3Normal      mometasone-formoterol (DULERA) 100-5 MCG/ACT inhaler INHALE TWO PUFFS BY MOUTH TWICE A DAY, Disp-13 g, R-2Normal      furosemide (LASIX) 20 MG tablet Take 1 tablet by mouth 2 times daily, Disp-60 tablet, R-3Normal      cariprazine hcl (VRAYLAR) 3 MG CAPS capsule Take 1 capsule by mouth daily, Disp-30 capsule, R-2Normal      omeprazole (PRILOSEC) 40 MG delayed release capsule Take 1 capsule by mouth every morning (before breakfast), Disp-30 capsule, R-5Normal      ondansetron (ZOFRAN) 4 MG tablet Take 1 tablet by mouth 3 times daily as needed for Nausea or Vomiting, Disp-30 tablet, R-0Normal      levothyroxine (SYNTHROID) 50 MCG tablet Take 1 tablet by mouth daily, Disp-30 tablet, R-5Normal      vitamin D (ERGOCALCIFEROL) 1.25 MG (23407 UT) CAPS capsule Take 1 capsule by mouth once a week, Disp-4 capsule, R-5Normal      albuterol sulfate HFA (VENTOLIN HFA) 108 (90 Base) MCG/ACT inhaler Inhale 2 puffs into the lungs 4 times daily as needed for Wheezing, Disp-1 each, R-2Normal      lidocaine viscous hcl (XYLOCAINE) 2 % SOLN solution Take 15 mLs by mouth as needed for Irritation, Disp-1 each, R-0Normal      ipratropium-albuterol (DUONEB) 0.5-2.5 (3) MG/3ML SOLN nebulizer solution Inhale 3 mLs into the lungs every 4 hours, Disp-360 mL,R-0Normal      methadone (DOLOPHINE) 10 MG tablet Take 90 mg by mouth every morning . Historical Med             ALLERGIES     Bactrim [sulfamethoxazole-trimethoprim] and Flagyl [metronidazole]    FAMILYHISTORY     History reviewed. No pertinent family history. SOCIAL HISTORY       Social History     Tobacco Use    Smoking status: Current Every Day Smoker     Packs/day: 0.50     Types: Cigarettes    Smokeless tobacco: Never Used   Vaping Use    Vaping Use: Never used   Substance Use Topics    Alcohol use: Yes     Alcohol/week: 0.8 standard drinks     Types: 1 Standard drinks or equivalent per week     Comment: social    Drug use: Yes     Types: IV, Marijuana (Weed)     Comment: heroin       SCREENINGS    [place] Coma Scale  Eye Opening: Spontaneous  Best Verbal Response: Oriented  Best Motor Response: Obeys commands  [place] Coma Scale Score: 15      PHYSICAL EXAM    (up to 7 for level 4, 8 or more for level 5)     ED Triage Vitals [04/13/22 0720]   BP Temp Temp Source Pulse Resp SpO2 Height Weight   135/73 98.6 °F (37 °C) Temporal 102 18 95 % 5' 7\" (1.702 m) 166 lb 0.1 oz (75.3 kg)       Physical Exam  Vitals and nursing note reviewed. Constitutional:       General: She is not in acute distress. Appearance: Normal appearance. She is not ill-appearing. HENT:      Head: Normocephalic and atraumatic. Nose: Nose normal.   Eyes:      General:         Right eye: No discharge. Left eye: No discharge. Pulmonary:      Effort: Pulmonary effort is normal. No respiratory distress. Musculoskeletal:         General: Normal range of motion.       Cervical back: Normal range of motion. Comments: There is notable swelling in the area of the right heel and midfoot, with significant ecchymosis medial, posterior and lateral in the area of the calcaneus. Negative for pain with direct palpation over the right ankle joint or the distal foot. Normal examination of the right toes. No lacerations or bleeding. 2+ dorsalis pedis pulse on the right. Sensation to light touch grossly intact and capillary refill <3 seconds in the digits of the right lower extremity. Skin:     General: Skin is warm and dry. Neurological:      General: No focal deficit present. Mental Status: She is alert and oriented to person, place, and time. Psychiatric:         Mood and Affect: Mood normal.         Behavior: Behavior normal.         DIAGNOSTIC RESULTS   LABS:    Labs Reviewed - No data to display    When ordered only abnormal lab results are displayed. All other labs were within normal range or not returned as of this dictation. EKG: When ordered, EKG's are interpreted by the Emergency Department Physician in the absence of a cardiologist.  Please see their note for interpretation of EKG. RADIOLOGY:   Non-plain film images such as CT, Ultrasound and MRI are read by the radiologist. Plain radiographic images are visualized and preliminarily interpreted by the ED Provider with the below findings:    Interpretation per the Radiologist below, if available at the time of this note:    XR ANKLE RIGHT (MIN 3 VIEWS)   Final Result   Acute, comminuted and mildly displaced fracture of the calcaneus. XR FOOT RIGHT (MIN 3 VIEWS)   Final Result   Acute, comminuted and mildly displaced fracture of the calcaneus. CONSULTS:  None    PROCEDURES   Unless otherwise noted below, none.      Procedures    EMERGENCY DEPARTMENT COURSE and DIFFERENTIAL DIAGNOSIS/MDM:   Vitals:    Vitals:    04/13/22 0720 04/13/22 0951   BP: 135/73 (!) 145/80   Pulse: 102 104   Resp: 18 22   Temp: 98.6 °F (37 °C)    TempSrc: Temporal    SpO2: 95% 95%   Weight: 166 lb 0.1 oz (75.3 kg)    Height: 5' 7\" (1.702 m)        Patient was given the following medications:  Medications   oxyCODONE-acetaminophen (PERCOCET) 5-325 MG per tablet 1 tablet (1 tablet Oral Given 4/13/22 8597)           X-ray confirmed a calcaneus fracture, comminuted and displaced. There is good neurovascular status in the foot. I consulted the orthopedic NP Latia Colorado, who advised splinting, aspirin therapy to reduce the risk of blood clot, and prompt follow-up with orthopedics. Splint was placed in the ED. No indication for further work-up. Patient be discharged with a prescription for pain medication, a prescription for aspirin, and orthopedic follow-up instructions. The patient verbalized understanding and agreement with this plan of care. The patient was advised to return to the emergency department if symptoms should significantly worsen or if new and concerning symptoms should appear. I estimate there is LOW risk for UNSTABLE FRACTURE, COMPARTMENT SYNDROME, DEEP VENOUS THROMBOSIS, SEPTIC ARTHRITIS, NEUROVASCULAR COMPROMISE, or TENDON/LIGAMENT RUPTURE, thus I consider the discharge disposition reasonable. CRITICAL CARE TIME   None    FINAL IMPRESSION      1. Closed displaced fracture of body of right calcaneus, initial encounter          DISPOSITION/PLAN   DISPOSITION Decision To Discharge 04/13/2022 09:41:29 AM      PATIENT REFERRED TO:  Juan Cortes, [address] [place] [address]  [address] [place] [address]  [phone]    Schedule an appointment as soon as possible for a visit   For orthopedic follow-up care      DISCHARGE MEDICATIONS:  Discharge Medication List as of 4/13/2022  9:41 AM      START taking these medications    Details   oxyCODONE-acetaminophen (PERCOCET) 5-325 MG per tablet Take 1 tablet by mouth every 6 hours as needed for Pain for up to 3 days. , Disp-12 tablet, R-0Normal      aspirin (ASPIRIN CHILDRENS) 81 MG chewable tablet Take 1 tablet by mouth 2 times daily until otherwise instructed by your orthopedic doctor, Disp-30 tablet, R-0Normal             DISCONTINUED MEDICATIONS:  Discharge Medication List as of 4/13/2022  9:41 AM               (Please note that portions of this note were completed with a voice recognition program.  Efforts were made to edit the dictations but occasionally words are mis-transcribed.)    COLBY Moss (electronically signed)        Gennaro Claire [facility]  04/13/22 9190

## 2022-04-13 NOTE — ED NOTES
Discharge and education instructions reviewed. Patient verbalized understanding, teach-back successful. Patient denied questions at this time. No acute distress noted. Patient instructed to follow-up as noted - return to emergency department if symptoms worsen. Patient verbalized understanding. Discharged per EDMD with discharge instructions.           Bridgette Valencia RN  04/13/22 2656

## 2022-04-13 NOTE — ED NOTES
Short leg OCL splint applied to lower right leg posteriorly. CMS intact before and after application. Patient instructed on method of care for splint.       Nishant Calix  04/13/22 0717

## 2022-04-14 ENCOUNTER — OFFICE VISIT (OUTPATIENT)
Dept: ORTHOPEDIC SURGERY | Age: 47
End: 2022-04-14
Payer: COMMERCIAL

## 2022-04-14 VITALS — HEIGHT: 67 IN | BODY MASS INDEX: 26.06 KG/M2 | WEIGHT: 166 LBS

## 2022-04-14 DIAGNOSIS — S92.001A CLOSED DISPLACED FRACTURE OF RIGHT CALCANEUS, UNSPECIFIED PORTION OF CALCANEUS, INITIAL ENCOUNTER: Primary | ICD-10-CM

## 2022-04-14 DIAGNOSIS — M79.671 PAIN OF RIGHT HEEL: ICD-10-CM

## 2022-04-14 DIAGNOSIS — F17.200 CURRENT SMOKER: ICD-10-CM

## 2022-04-14 PROCEDURE — G8419 CALC BMI OUT NRM PARAM NOF/U: HCPCS | Performed by: ORTHOPAEDIC SURGERY

## 2022-04-14 PROCEDURE — 99203 OFFICE O/P NEW LOW 30 MIN: CPT | Performed by: ORTHOPAEDIC SURGERY

## 2022-04-14 PROCEDURE — 4004F PT TOBACCO SCREEN RCVD TLK: CPT | Performed by: ORTHOPAEDIC SURGERY

## 2022-04-14 PROCEDURE — G8427 DOCREV CUR MEDS BY ELIG CLIN: HCPCS | Performed by: ORTHOPAEDIC SURGERY

## 2022-04-14 PROCEDURE — 99406 BEHAV CHNG SMOKING 3-10 MIN: CPT | Performed by: ORTHOPAEDIC SURGERY

## 2022-04-14 PROCEDURE — L4360 PNEUMAT WALKING BOOT PRE CST: HCPCS | Performed by: ORTHOPAEDIC SURGERY

## 2022-04-14 RX ORDER — OXYCODONE HYDROCHLORIDE AND ACETAMINOPHEN 5; 325 MG/1; MG/1
1 TABLET ORAL EVERY 6 HOURS PRN
Qty: 12 TABLET | Refills: 0 | Status: SHIPPED | OUTPATIENT
Start: 2022-04-15 | End: 2022-04-18

## 2022-04-14 NOTE — LETTER
[facility]  [address]  [address] [address]  Phone: [phone]  Fax: [phone]    Ivana Frederick MD        April 14, 2022     Patient: James Mcneill   YOB: 1975   Date of Visit: 4/14/2022       To Whom It May Concern: It is my medical opinion that [employer] should remain out of work until 4/25/22. If you have any questions or concerns, please don't hesitate to call.     Sincerely,        Ivana Frederick MD

## 2022-04-14 NOTE — PROGRESS NOTES
CHIEF COMPLAIN: Right ankle pain / calcaneus fracture. DATE OF INJURY: 2022    HISTORY:  Ms. Claudia Thompson 55 y.o.  female presents today for the first visit for evaluation of a right ankle injury which occurred when she fell 6 to 7 feet off of a retaining wall and landed on her right heel. She was first seen and evaluated in [facility] ER, where she was x-rayed, splinted and asked to f/u with Orthopedics. She was initially referred to Dr. Susie Cerda who then referred her here for further management. She is complaining of heel and ankle pain and swelling. This is better with elevation and worse with bearing any wt. she rates her pain a 10/10 VAS. The pain is sharp and not radiating. No other complaint. She is a smoker. She has a history of alcohol and drug abuse. Past Medical History:   Diagnosis Date    Asthma     Depression     Drug dependence ([facility] [address]     Drug overdose, multiple drugs 3/17/2015    Eczema of hand 2012    ETOH abuse 2011    Hepatitis C 3/1/15    Heroin withdrawal ([facility] [address]     MRSA infection 14    Suicidal ideation        Past Surgical History:   Procedure Laterality Date    ABSCESS DRAINAGE Left 2017    I&D Left forearm deep abscess, VAC application    BREAST SURGERY       SECTION         Social History     Socioeconomic History    Marital status:      Spouse name: Not on file    Number of children: Not on file    Years of education: Not on file    Highest education level: Not on file   Occupational History    Not on file   Tobacco Use    Smoking status: Current Every Day Smoker     Packs/day: 0.50     Types: Cigarettes    Smokeless tobacco: Never Used   Vaping Use    Vaping Use: Never used   Substance and Sexual Activity    Alcohol use:  Yes     Alcohol/week: 0.8 standard drinks     Types: 1 Standard drinks or equivalent per week     Comment: social    Drug use: Yes     Types: IV, Marijuana (Weed)     Comment: heroin    Sexual activity: Yes     Partners: Male     Comment: pt states gram/day. Other Topics Concern    Not on file   Social History Narrative    Not on file     Social Determinants of Health     Financial Resource Strain:     Difficulty of Paying Living Expenses: Not on file   Food Insecurity:     Worried About Running Out of Food in the Last Year: Not on file    Gabe of Food in the Last Year: Not on file   Transportation Needs:     Lack of Transportation (Medical): Not on file    Lack of Transportation (Non-Medical): Not on file   Physical Activity:     Days of Exercise per Week: Not on file    Minutes of Exercise per Session: Not on file   Stress:     Feeling of Stress : Not on file   Social Connections:     Frequency of Communication with Friends and Family: Not on file    Frequency of Social Gatherings with Friends and Family: Not on file    Attends Yazidism Services: Not on file    Active Member of [address] [place] [address] [email or web address] or Organizations: Not on file    Attends Club or Organization Meetings: Not on file    Marital Status: Not on file   Intimate Partner Violence:     Fear of Current or Ex-Partner: Not on file    Emotionally Abused: Not on file    Physically Abused: Not on file    Sexually Abused: Not on file   Housing Stability:     Unable to Pay for Housing in the Last Year: Not on file    Number of [place] in the Last Year: Not on file    Unstable Housing in the Last Year: Not on file       No family history on file. Current Outpatient Medications on File Prior to Visit   Medication Sig Dispense Refill    oxyCODONE-acetaminophen (PERCOCET) 5-325 MG per tablet Take 1 tablet by mouth every 6 hours as needed for Pain for up to 3 days.  12 tablet 0    aspirin (ASPIRIN CHILDRENS) 81 MG chewable tablet Take 1 tablet by mouth 2 times daily until otherwise instructed by your orthopedic doctor 30 tablet 0    gabapentin (NEURONTIN) 600 MG tablet TAKE ONE TABLET BY MOUTH THREE TIMES A DAY 90 tablet 0    tiZANidine (ZANAFLEX) 4 MG tablet TAKE ONE TABLET BY MOUTH THREE TIMES A  tablet 1    valACYclovir (VALTREX) 500 MG tablet Po daily 30 tablet 2    acetaminophen (TYLENOL) 500 MG tablet Take 1 tablet by mouth 3 times daily as needed for Pain 90 tablet 5    escitalopram (LEXAPRO) 20 MG tablet Take 1 tablet by mouth daily 30 tablet 3    mometasone-formoterol (DULERA) 100-5 MCG/ACT inhaler INHALE TWO PUFFS BY MOUTH TWICE A DAY 13 g 2    furosemide (LASIX) 20 MG tablet Take 1 tablet by mouth 2 times daily 60 tablet 3    cariprazine hcl (VRAYLAR) 3 MG CAPS capsule Take 1 capsule by mouth daily 30 capsule 2    omeprazole (PRILOSEC) 40 MG delayed release capsule Take 1 capsule by mouth every morning (before breakfast) 30 capsule 5    ondansetron (ZOFRAN) 4 MG tablet Take 1 tablet by mouth 3 times daily as needed for Nausea or Vomiting 30 tablet 0    levothyroxine (SYNTHROID) 50 MCG tablet Take 1 tablet by mouth daily 30 tablet 5    vitamin D (ERGOCALCIFEROL) 1.25 MG (76368 UT) CAPS capsule Take 1 capsule by mouth once a week 4 capsule 5    albuterol sulfate HFA (VENTOLIN HFA) 108 (90 Base) MCG/ACT inhaler Inhale 2 puffs into the lungs 4 times daily as needed for Wheezing 1 each 2    lidocaine viscous hcl (XYLOCAINE) 2 % SOLN solution Take 15 mLs by mouth as needed for Irritation 1 each 0    ipratropium-albuterol (DUONEB) 0.5-2.5 (3) MG/3ML SOLN nebulizer solution Inhale 3 mLs into the lungs every 4 hours 360 mL 0    methadone (DOLOPHINE) 10 MG tablet Take 90 mg by mouth every morning . No current facility-administered medications on file prior to visit. Pertinent items are noted in HPI  Review of systems reviewed from Patient History Form and available in the patient's chart under the Media tab. No change noted. PHYSICAL EXAMINATION:  Ms. Michaela Garzon is a very pleasant 55 y.o.  female who presents today in no acute distress, awake, alert, and oriented.   She is well dressed, nourished and  groomed. Patient with normal affect. Height is  5' 7\" (1.702 m), weight is 166 lb (75.3 kg), Body mass index is 26 kg/m². Resting respiratory rate is 16. Examination of the gait, showed that the patient walks with a crutch, NWB right leg and in a splint . Examination of both ankles showing a decreased range of motion of the right ankle and subtalar joints compare to the other side. There is moderate swelling that can be seen, as well as ecchymosis. She has intact sensation and good pedal pulses. She has significant tenderness on deep palpation over the calcaneus of the right ankle. Skin intact. IMAGING:  Xrays, 3 views of the right ankle and foot dated 4/13/2022 from [address] [place] [address],  were reviewed, and showed a displaced comminuted intra-articular fracture of the calcaneus    IMPRESSION:  Right calcaneus displaced intra-articular comminuted fracture. PLAN:  I discussed that the overall alignment of this fracture and discussed surgical as well as nonsurgical options. At this point we discussed getting a stat CT scan right foot and ankle to further evaluate the extent of the fracture. We applied a boot today in the office and instructed her  in care. She was instructed to rest, ice, and elevate above her heart level as much as possible to help with the swelling. We will see her  back in 1 week and will discuss the CT scan and surgical options if needed. The patient smokes cigarettes, and we discussed with the patient the risks of smoking on general health and also on bone and soft tissue healing (delay and non-union), and promised to cut down or stop smoking. Smoking: Educated the patient regarding the hazards of smoking and that it harms their body in many ways. It increases the chance of developing heart disease, lung disease, cancer, and other health problems including poor bone and wound healing.     The importance of smoking cessation for optimal bone and wound healing was stressed. This was communicated verbally, 5 Minutes. Procedures    Paulag / Richy Duos Tall Walking Boot     Patient was prescribed a Tall Walking Boot. The right ankle will require stabilization / immobilization from this semi-rigid / rigid orthosis to improve their function. The orthosis will assist in protecting the affected area, provide functional support and facilitate healing. Patient was instructed to progress ambulation  as non weight bearing in the device. The plan of care is to progress the patient to full weight bearing status. The patient was educated and fit by a healthcare professional with expert knowledge and specialization in brace application while under the direct supervision of the physician. Verbal and written instructions for the use of and application of this item were provided. They were instructed to contact the office immediately should the brace result in increased pain, decreased sensation, increased swelling or worsening of the condition.        Torrey Cesar MD

## 2022-04-15 ENCOUNTER — TELEPHONE (OUTPATIENT)
Dept: ORTHOPEDIC SURGERY | Age: 47
End: 2022-04-15

## 2022-04-15 ENCOUNTER — HOSPITAL ENCOUNTER (OUTPATIENT)
Dept: CT IMAGING | Age: 47
Discharge: HOME | End: 2022-04-15
Payer: COMMERCIAL

## 2022-04-15 DIAGNOSIS — M79.671 PAIN OF RIGHT HEEL: ICD-10-CM

## 2022-04-15 PROCEDURE — 73700 CT LOWER EXTREMITY W/O DYE: CPT

## 2022-04-15 NOTE — TELEPHONE ENCOUNTER
Verified with pharmacy that they do have a prescription of Oxycodone available for patient to . Patient aware.

## 2022-04-15 NOTE — TELEPHONE ENCOUNTER
Prescription Refill     Medication Name: OXYCODONE     Pharmacy: [facility]. [id number]  Patient Contact Number:  [phone]

## 2022-04-15 NOTE — TELEPHONE ENCOUNTER
[address] [facility] [place] Name: [address]  Contact Name: Johny Luís Number: [phone]  Request or Information: THE FACILITY NEED FOR SOMEONE TO CALL THEM BACK ABOUT THIS PATIENT. PLEASE ADVISE.

## 2022-04-22 ENCOUNTER — OFFICE VISIT (OUTPATIENT)
Dept: ORTHOPEDIC SURGERY | Age: 47
End: 2022-04-22
Payer: COMMERCIAL

## 2022-04-22 VITALS — BODY MASS INDEX: 26.06 KG/M2 | WEIGHT: 166 LBS | HEIGHT: 67 IN

## 2022-04-22 DIAGNOSIS — S92.001A CLOSED DISPLACED FRACTURE OF RIGHT CALCANEUS, UNSPECIFIED PORTION OF CALCANEUS, INITIAL ENCOUNTER: Primary | ICD-10-CM

## 2022-04-22 DIAGNOSIS — F17.200 CURRENT SMOKER: ICD-10-CM

## 2022-04-22 PROCEDURE — G8427 DOCREV CUR MEDS BY ELIG CLIN: HCPCS | Performed by: ORTHOPAEDIC SURGERY

## 2022-04-22 PROCEDURE — 28400 CLTX CALCANEAL FX W/O MNPJ: CPT | Performed by: ORTHOPAEDIC SURGERY

## 2022-04-22 PROCEDURE — 99214 OFFICE O/P EST MOD 30 MIN: CPT | Performed by: ORTHOPAEDIC SURGERY

## 2022-04-22 PROCEDURE — 99406 BEHAV CHNG SMOKING 3-10 MIN: CPT | Performed by: ORTHOPAEDIC SURGERY

## 2022-04-22 PROCEDURE — G8419 CALC BMI OUT NRM PARAM NOF/U: HCPCS | Performed by: ORTHOPAEDIC SURGERY

## 2022-04-22 PROCEDURE — 4004F PT TOBACCO SCREEN RCVD TLK: CPT | Performed by: ORTHOPAEDIC SURGERY

## 2022-04-22 NOTE — LETTER
[facility]  [place] [address] [address] [address]  Phone: [phone]  Fax: [phone]    Deshawn Palacios MD        April 22, 2022     Patient: Chevy Mera   YOB: 1975   Date of Visit: 4/22/2022       To Whom It May Concern: It is my medical opinion that [employer] that Lakia Romero should remain non weight bearing for 3 months. If you have any questions or concerns, please don't hesitate to call.     Sincerely,        Deshawn Palacios MD

## 2022-04-22 NOTE — PROGRESS NOTES
CHIEF COMPLAIN: Right ankle pain / calcaneus fracture. DATE OF INJURY: 2022    HISTORY:  Ms. Tameka Vail 55 y.o.  female presents today for f/u evaluation of a right ankle injury which occurred when she fell 6 to 7 feet off of a retaining wall and landed on her right heel. She was first seen and evaluated in [facility] ER, where she was x-rayed, splinted and asked to f/u with Orthopedics. She was initially referred to Dr. Keenan Crawford who then referred her here for further management. She is complaining of heel and ankle pain and swelling. This is better with elevation and worse with bearing any wt. she rates her pain a 8/10 VAS. The pain is sharp and not radiating. No other complaint. She is a smoker. She has a history of alcohol and drug abuse. Past Medical History:   Diagnosis Date    Asthma     Depression     Drug dependence ([facility] [address]     Drug overdose, multiple drugs 3/17/2015    Eczema of hand 2012    ETOH abuse 2011    Hepatitis C 3/1/15    Heroin withdrawal ([facility] [address]     MRSA infection 14    Suicidal ideation        Past Surgical History:   Procedure Laterality Date    ABSCESS DRAINAGE Left 2017    I&D Left forearm deep abscess, VAC application    BREAST SURGERY       SECTION         Social History     Socioeconomic History    Marital status:      Spouse name: Not on file    Number of children: Not on file    Years of education: Not on file    Highest education level: Not on file   Occupational History    Not on file   Tobacco Use    Smoking status: Current Every Day Smoker     Packs/day: 0.50     Types: Cigarettes    Smokeless tobacco: Never Used   Vaping Use    Vaping Use: Never used   Substance and Sexual Activity    Alcohol use:  Yes     Alcohol/week: 0.8 standard drinks     Types: 1 Standard drinks or equivalent per week     Comment: social    Drug use: Yes     Types: IV, Marijuana (Weed)     Comment: heroin    Sexual activity: Yes Partners: Male     Comment: pt states gram/day. Other Topics Concern    Not on file   Social History Narrative    Not on file     Social Determinants of Health     Financial Resource Strain:     Difficulty of Paying Living Expenses: Not on file   Food Insecurity:     Worried About Running Out of Food in the Last Year: Not on file    Gabe of Food in the Last Year: Not on file   Transportation Needs:     Lack of Transportation (Medical): Not on file    Lack of Transportation (Non-Medical): Not on file   Physical Activity:     Days of Exercise per Week: Not on file    Minutes of Exercise per Session: Not on file   Stress:     Feeling of Stress : Not on file   Social Connections:     Frequency of Communication with Friends and Family: Not on file    Frequency of Social Gatherings with Friends and Family: Not on file    Attends Christianity Services: Not on file    Active Member of [address] [place] [address] [employer] or Organizations: Not on file    Attends Club or Organization Meetings: Not on file    Marital Status: Not on file   Intimate Partner Violence:     Fear of Current or Ex-Partner: Not on file    Emotionally Abused: Not on file    Physically Abused: Not on file    Sexually Abused: Not on file   Housing Stability:     Unable to Pay for Housing in the Last Year: Not on file    Number of [place] in the Last Year: Not on file    Unstable Housing in the Last Year: Not on file       No family history on file.     Current Outpatient Medications on File Prior to Visit   Medication Sig Dispense Refill    aspirin (ASPIRIN CHILDRENS) 81 MG chewable tablet Take 1 tablet by mouth 2 times daily until otherwise instructed by your orthopedic doctor 30 tablet 0    gabapentin (NEURONTIN) 600 MG tablet TAKE ONE TABLET BY MOUTH THREE TIMES A DAY 90 tablet 0    tiZANidine (ZANAFLEX) 4 MG tablet TAKE ONE TABLET BY MOUTH THREE TIMES A  tablet 1    valACYclovir (VALTREX) 500 MG tablet Po daily 30 tablet 2    acetaminophen (TYLENOL) 500 MG tablet Take 1 tablet by mouth 3 times daily as needed for Pain 90 tablet 5    escitalopram (LEXAPRO) 20 MG tablet Take 1 tablet by mouth daily 30 tablet 3    mometasone-formoterol (DULERA) 100-5 MCG/ACT inhaler INHALE TWO PUFFS BY MOUTH TWICE A DAY 13 g 2    furosemide (LASIX) 20 MG tablet Take 1 tablet by mouth 2 times daily 60 tablet 3    cariprazine hcl (VRAYLAR) 3 MG CAPS capsule Take 1 capsule by mouth daily 30 capsule 2    omeprazole (PRILOSEC) 40 MG delayed release capsule Take 1 capsule by mouth every morning (before breakfast) 30 capsule 5    ondansetron (ZOFRAN) 4 MG tablet Take 1 tablet by mouth 3 times daily as needed for Nausea or Vomiting 30 tablet 0    levothyroxine (SYNTHROID) 50 MCG tablet Take 1 tablet by mouth daily 30 tablet 5    vitamin D (ERGOCALCIFEROL) 1.25 MG (45804 UT) CAPS capsule Take 1 capsule by mouth once a week 4 capsule 5    albuterol sulfate HFA (VENTOLIN HFA) 108 (90 Base) MCG/ACT inhaler Inhale 2 puffs into the lungs 4 times daily as needed for Wheezing 1 each 2    lidocaine viscous hcl (XYLOCAINE) 2 % SOLN solution Take 15 mLs by mouth as needed for Irritation 1 each 0    ipratropium-albuterol (DUONEB) 0.5-2.5 (3) MG/3ML SOLN nebulizer solution Inhale 3 mLs into the lungs every 4 hours 360 mL 0    methadone (DOLOPHINE) 10 MG tablet Take 90 mg by mouth every morning . No current facility-administered medications on file prior to visit. Pertinent items are noted in HPI  Review of systems reviewed from Patient History Form and available in the patient's chart under the Media tab. No change noted. PHYSICAL EXAMINATION:  Ms. Romie Perez is a very pleasant 55 y.o.  female who presents today in no acute distress, awake, alert, and oriented. She is well dressed, nourished and  groomed. Patient with normal affect. Height is  5' 7\" (1.702 m), weight is 166 lb (75.3 kg), Body mass index is 26 kg/m². Resting respiratory rate is 16. Examination of the gait, showed that the patient walks with a crutch, NWB right leg and in a boot. Examination of both ankles showing a decreased range of motion of the right ankle and subtalar joints compare to the other side. There is moderate swelling that can be seen, as well as ecchymosis. She has intact sensation and good pedal pulses. She has significant tenderness on deep palpation over the calcaneus of the right ankle. Skin intact. IMAGING:  Xrays, 3 views of the right ankle and foot dated 4/13/2022 from [facility] ER,  were reviewed, and showed a displaced comminuted intra-articular fracture of the calcaneus    CT scan right foot dated 4/15/2022 was reviewed and showed     Comminuted mildly displaced calcaneal fracture with the fracture planes   extending to the subtalar joint and sustentaculum torsten. IMPRESSION:  Right calcaneus displaced intra-articular comminuted fracture. PLAN:   I discussed with Lali Cazares the CT scan and the treatment options and that the overall alignment of this fracture is good and we can try to treat this non-operatively in a boot, NWB for 12 weeks. We discussed the risk of nonunion and or malunion. We will see her  back in 6 weeks at which time we will get a new xray of the right calcaneus. The patient smokes cigarettes, and we discussed with the patient the risks of smoking on general health and also on bone and soft tissue healing (delay and non-union), and promised to cut down or stop smoking. Smoking: Educated the patient regarding the hazards of smoking and that it harms their body in many ways. It increases the chance of developing heart disease, lung disease, cancer, and other health problems including poor bone and wound healing. The importance of smoking cessation for optimal bone and wound healing was stressed. This was communicated verbally, 5 Minutes.       Armond Chahal MD

## 2022-04-22 NOTE — LETTER
[facility]  [facility] [address] [address]  Phone: [phone]  Fax: [phone]    Orlando Blanca MD        April 22, 2022     Patient: Kirt Apple   YOB: 1975   Date of Visit: 4/22/2022       To Whom It May Concern: It is my medical opinion that [employer] should remain non weight bearing in her right foot for 3 months. If you have any questions or concerns, please don't hesitate to call.     Sincerely,        Orlando Blanca MD

## 2022-06-03 ENCOUNTER — OFFICE VISIT (OUTPATIENT)
Dept: ORTHOPEDIC SURGERY | Age: 47
End: 2022-06-03

## 2022-06-03 VITALS — HEIGHT: 67 IN | WEIGHT: 166 LBS | RESPIRATION RATE: 16 BRPM | BODY MASS INDEX: 26.06 KG/M2

## 2022-06-03 DIAGNOSIS — S92.001A CLOSED DISPLACED FRACTURE OF RIGHT CALCANEUS, UNSPECIFIED PORTION OF CALCANEUS, INITIAL ENCOUNTER: Primary | ICD-10-CM

## 2022-06-03 PROCEDURE — 99024 POSTOP FOLLOW-UP VISIT: CPT | Performed by: NURSE PRACTITIONER

## 2022-06-03 PROCEDURE — APPNB30 APP NON BILLABLE TIME 0-30 MINS: Performed by: NURSE PRACTITIONER

## 2022-06-03 NOTE — LETTER
[facility]  [place] [address] [address] [address]  Phone: [phone]  Fax: [phone]    Emma Hunt MD        Yudi 3, 2022     Patient: Ruthie Kim   YOB: 1975   Date of Visit: 6/3/2022       To Whom It May Concern: It is my medical opinion that Shiv Tobar should remain out of work for another 6 weeks approximetly 7.15.2022. If you have any questions or concerns, please don't hesitate to call.     Sincerely,      Emma Hunt MD

## 2022-06-03 NOTE — PROGRESS NOTES
Types: 1 Standard drinks or equivalent per week     Comment: social    Drug use: Yes     Types: IV, Marijuana Myrtis Regulus)     Comment: heroin    Sexual activity: Yes     Partners: Male     Comment: pt states gram/day. Other Topics Concern    Not on file   Social History Narrative    Not on file     Social Determinants of Health     Financial Resource Strain:     Difficulty of Paying Living Expenses: Not on file   Food Insecurity:     Worried About Running Out of Food in the Last Year: Not on file    Gabe of Food in the Last Year: Not on file   Transportation Needs:     Lack of Transportation (Medical): Not on file    Lack of Transportation (Non-Medical): Not on file   Physical Activity:     Days of Exercise per Week: Not on file    Minutes of Exercise per Session: Not on file   Stress:     Feeling of Stress : Not on file   Social Connections:     Frequency of Communication with Friends and Family: Not on file    Frequency of Social Gatherings with Friends and Family: Not on file    Attends [employer] Services: Not on file    Active Member of [address] [place] [address] or Organizations: Not on file    Attends Club or Organization Meetings: Not on file    Marital Status: Not on file   Intimate Partner Violence:     Fear of Current or Ex-Partner: Not on file    Emotionally Abused: Not on file    Physically Abused: Not on file    Sexually Abused: Not on file   Housing Stability:     Unable to Pay for Housing in the Last Year: Not on file    Number of [place] in the Last Year: Not on file    Unstable Housing in the Last Year: Not on file       No family history on file.     Current Outpatient Medications on File Prior to Visit   Medication Sig Dispense Refill    aspirin (ASPIRIN CHILDRENS) 81 MG chewable tablet Take 1 tablet by mouth 2 times daily until otherwise instructed by your orthopedic doctor 30 tablet 0    tiZANidine (ZANAFLEX) 4 MG tablet TAKE ONE TABLET BY MOUTH THREE TIMES A  tablet 1    valACYclovir (VALTREX) 500 MG tablet Po daily 30 tablet 2    acetaminophen (TYLENOL) 500 MG tablet Take 1 tablet by mouth 3 times daily as needed for Pain 90 tablet 5    escitalopram (LEXAPRO) 20 MG tablet Take 1 tablet by mouth daily 30 tablet 3    mometasone-formoterol (DULERA) 100-5 MCG/ACT inhaler INHALE TWO PUFFS BY MOUTH TWICE A DAY 13 g 2    furosemide (LASIX) 20 MG tablet Take 1 tablet by mouth 2 times daily 60 tablet 3    cariprazine hcl (VRAYLAR) 3 MG CAPS capsule Take 1 capsule by mouth daily 30 capsule 2    omeprazole (PRILOSEC) 40 MG delayed release capsule Take 1 capsule by mouth every morning (before breakfast) 30 capsule 5    ondansetron (ZOFRAN) 4 MG tablet Take 1 tablet by mouth 3 times daily as needed for Nausea or Vomiting 30 tablet 0    levothyroxine (SYNTHROID) 50 MCG tablet Take 1 tablet by mouth daily 30 tablet 5    vitamin D (ERGOCALCIFEROL) 1.25 MG (06213 UT) CAPS capsule Take 1 capsule by mouth once a week 4 capsule 5    albuterol sulfate HFA (VENTOLIN HFA) 108 (90 Base) MCG/ACT inhaler Inhale 2 puffs into the lungs 4 times daily as needed for Wheezing 1 each 2    lidocaine viscous hcl (XYLOCAINE) 2 % SOLN solution Take 15 mLs by mouth as needed for Irritation 1 each 0    ipratropium-albuterol (DUONEB) 0.5-2.5 (3) MG/3ML SOLN nebulizer solution Inhale 3 mLs into the lungs every 4 hours 360 mL 0    methadone (DOLOPHINE) 10 MG tablet Take 90 mg by mouth every morning .  gabapentin (NEURONTIN) 600 MG tablet TAKE ONE TABLET BY MOUTH THREE TIMES A DAY 90 tablet 0     No current facility-administered medications on file prior to visit. Pertinent items are noted in HPI  Review of systems reviewed from Patient History Form and available in the patient's chart under the Media tab. No change noted. PHYSICAL EXAMINATION:  Ms. Wally Jimenez is a very pleasant 55 y.o.  female who presents today in no acute distress, awake, alert, and oriented.   She is well dressed, nourished and  groomed. Patient with normal affect. Height is  5' 7\" (1.702 m), weight is 166 lb (75.3 kg), Body mass index is 26 kg/m². Resting respiratory rate is 16. Examination of the gait, showed that the patient walks with a crutch, WB right leg and in a boot. Examination of both ankles showing a decreased range of motion of the right ankle and subtalar joints compare to the other side. There is moderate swelling that can be seen, improved ecchymosis. She has intact sensation and good pedal pulses. She has moderate tenderness on deep palpation over the calcaneus of the right ankle. Skin intact. IMAGING:  Xrays, 2 views of the right calcaneus,  were reviewed, and showed a displaced comminuted intra-articular fracture of the calcaneus with more heel varus and displacement from prior. CT scan right foot dated 4/15/2022 was reviewed and showed     Comminuted mildly displaced calcaneal fracture with the fracture planes   extending to the subtalar joint and sustentaculum torsten. IMPRESSION:  Right calcaneus displaced intra-articular comminuted fracture with more displacement. PLAN:   I discussed with Lali Cazares the CT scan and the treatment options and that the overall alignment of this fracture is more displaced. We discussed the importance of following medical advice. At this point. We will continue to treat this non-operatively in a boot, [facility] for 12 weeks. We discussed the risk of nonunion and or malunion. We will see her  back in 6 weeks at which time we will get a new xray of the right calcaneus. The patient smokes cigarettes, and we discussed with the patient the risks of smoking on general health and also on bone and soft tissue healing (delay and non-union), and promised to cut down or stop smoking. Smoking: Educated the patient regarding the hazards of smoking and that it harms their body in many ways.  It increases the chance of developing heart disease, lung disease, cancer, and other health problems including poor bone and wound healing. The importance of smoking cessation for optimal bone and wound healing was stressed. This was communicated verbally, 5 Minutes.       MACK Hernández - CNP

## 2022-06-03 NOTE — LETTER
[facility]  [address] [address]  Phone: [phone]  Fax: [phone]    MACK Main CNP    Yudi 3, 2022     Jenn Rangel MD  [id number]    Patient: Laisha Sanchez   MR Number: [id number]   YOB: 1975   Date of Visit: 6/3/2022       Dear Jenn Rangel:    Thank you for referring Shayna Cazares to me for evaluation/treatment. Below are the relevant portions of my assessment and plan of care. If you have questions, please do not hesitate to call me. I look forward to following Lali along with you.     Sincerely,      MACK Main CNP

## 2022-06-09 ENCOUNTER — TELEPHONE (OUTPATIENT)
Dept: ORTHOPEDIC SURGERY | Age: 47
End: 2022-06-09

## 2022-07-12 ENCOUNTER — TELEPHONE (OUTPATIENT)
Dept: ORTHOPEDIC SURGERY | Age: 47
End: 2022-07-12

## 2022-07-12 NOTE — TELEPHONE ENCOUNTER
Appointment Request     Patient requesting earlier appointment: Yes  Appointment offered to patient: YES  Patient Contact Number: [phone]    PATIENT CALLING REGARDING TO R/S HER APPOINTMENT THAT WAS SCHEDULED ON July 15, 2022. PATIENT DECLINE TO SEE PA. PATIENT NEEDS TO SEE DR. RUBIO BEFORE July 25, 2022. PLEASE CALL BACK PATIENT AT THE ABOVE NUMBER.

## 2022-07-22 ENCOUNTER — HOSPITAL ENCOUNTER (EMERGENCY)
Age: 47
Discharge: LEFT BEFORE DISCHARGE | End: 2022-07-22
Payer: COMMERCIAL

## 2022-07-22 ENCOUNTER — APPOINTMENT (OUTPATIENT)
Dept: GENERAL RADIOLOGY | Age: 47
End: 2022-07-22
Payer: COMMERCIAL

## 2022-07-22 VITALS
DIASTOLIC BLOOD PRESSURE: 83 MMHG | RESPIRATION RATE: 17 BRPM | HEART RATE: 86 BPM | TEMPERATURE: 98.2 F | SYSTOLIC BLOOD PRESSURE: 135 MMHG | OXYGEN SATURATION: 98 %

## 2022-07-22 DIAGNOSIS — S92.001A CLOSED NONDISPLACED FRACTURE OF RIGHT CALCANEUS, UNSPECIFIED PORTION OF CALCANEUS, INITIAL ENCOUNTER: Primary | ICD-10-CM

## 2022-07-22 DIAGNOSIS — F41.9 ANXIETY: ICD-10-CM

## 2022-07-22 DIAGNOSIS — N93.9 VAGINAL BLEEDING: ICD-10-CM

## 2022-07-22 LAB
ANION GAP SERPL CALCULATED.3IONS-SCNC: 14 MMOL/L (ref 3–16)
BASOPHILS ABSOLUTE: 0 K/UL (ref 0–0.2)
BASOPHILS RELATIVE PERCENT: 0.9 %
BUN BLDV-MCNC: 4 MG/DL (ref 7–20)
CALCIUM SERPL-MCNC: 8.7 MG/DL (ref 8.3–10.6)
CHLORIDE BLD-SCNC: 106 MMOL/L (ref 99–110)
CO2: 26 MMOL/L (ref 21–32)
CREAT SERPL-MCNC: 0.7 MG/DL (ref 0.6–1.1)
EOSINOPHILS ABSOLUTE: 0.1 K/UL (ref 0–0.6)
EOSINOPHILS RELATIVE PERCENT: 1.5 %
GFR AFRICAN AMERICAN: >60
GFR NON-AFRICAN AMERICAN: >60
GLUCOSE BLD-MCNC: 86 MG/DL (ref 70–99)
HCG QUALITATIVE: NEGATIVE
HCT VFR BLD CALC: 37.9 % (ref 36–48)
HEMOGLOBIN: 12.6 G/DL (ref 12–16)
LYMPHOCYTES ABSOLUTE: 1.7 K/UL (ref 1–5.1)
LYMPHOCYTES RELATIVE PERCENT: 31.5 %
MCH RBC QN AUTO: 30.5 PG (ref 26–34)
MCHC RBC AUTO-ENTMCNC: 33.3 G/DL (ref 31–36)
MCV RBC AUTO: 91.6 FL (ref 80–100)
MONOCYTES ABSOLUTE: 0.4 K/UL (ref 0–1.3)
MONOCYTES RELATIVE PERCENT: 7.1 %
NEUTROPHILS ABSOLUTE: 3.2 K/UL (ref 1.7–7.7)
NEUTROPHILS RELATIVE PERCENT: 59 %
PDW BLD-RTO: 22.3 % (ref 12.4–15.4)
PLATELET # BLD: 59 K/UL (ref 135–450)
PLATELET SLIDE REVIEW: ABNORMAL
PMV BLD AUTO: 8 FL (ref 5–10.5)
POTASSIUM SERPL-SCNC: 3.5 MMOL/L (ref 3.5–5.1)
RBC # BLD: 4.14 M/UL (ref 4–5.2)
RBC # BLD: NORMAL 10*6/UL
SLIDE REVIEW: ABNORMAL
SODIUM BLD-SCNC: 146 MMOL/L (ref 136–145)
WBC # BLD: 5.4 K/UL (ref 4–11)

## 2022-07-22 PROCEDURE — 99284 EMERGENCY DEPT VISIT MOD MDM: CPT

## 2022-07-22 PROCEDURE — 85025 COMPLETE CBC W/AUTO DIFF WBC: CPT

## 2022-07-22 PROCEDURE — 73610 X-RAY EXAM OF ANKLE: CPT

## 2022-07-22 PROCEDURE — 73630 X-RAY EXAM OF FOOT: CPT

## 2022-07-22 PROCEDURE — 84703 CHORIONIC GONADOTROPIN ASSAY: CPT

## 2022-07-22 PROCEDURE — 80048 BASIC METABOLIC PNL TOTAL CA: CPT

## 2022-07-22 RX ORDER — HYDROCODONE BITARTRATE AND ACETAMINOPHEN 5; 325 MG/1; MG/1
1 TABLET ORAL ONCE
Status: DISCONTINUED | OUTPATIENT
Start: 2022-07-22 | End: 2022-07-23 | Stop reason: HOSPADM

## 2022-07-22 ASSESSMENT — ENCOUNTER SYMPTOMS
ABDOMINAL PAIN: 0
NAUSEA: 0
DIARRHEA: 0
CONSTIPATION: 0
EYE PAIN: 0
SORE THROAT: 0
BACK PAIN: 0
SHORTNESS OF BREATH: 0
VOMITING: 0
EYE REDNESS: 0
COUGH: 0

## 2022-07-22 NOTE — ED PROVIDER NOTES
She states that the  has ordered her to get a job, however she has not done this. She states that since she has not done this the  will likely send her to [facility]. This has been causing her a lot of anxiety. However she denies any suicidal or homicidal ideation. Additionally she states that she did hit her the top of her foot on a drill which is caused the abrasion on her foot. Nursing Notes were all reviewed and agreed with or any disagreements were addressed in the HPI. REVIEW OF SYSTEMS    (2-9 systems for level 4, 10 or more for level 5)     Review of Systems   Constitutional:  Negative for chills and fever. HENT:  Negative for ear pain and sore throat. Eyes:  Negative for pain and redness. Respiratory:  Negative for cough and shortness of breath. Cardiovascular:  Negative for chest pain and leg swelling. Gastrointestinal:  Negative for abdominal pain, constipation, diarrhea, nausea and vomiting. Genitourinary:  Negative for dysuria and hematuria. Musculoskeletal:  Negative for back pain and neck pain. Skin:  Negative for rash and wound. Neurological:  Negative for light-headedness and headaches. Psychiatric/Behavioral:  Negative for suicidal ideas. The patient is nervous/anxious.       PAST MEDICAL HISTORY     Past Medical History:   Diagnosis Date    Asthma     Depression     Drug dependence ([facility] [address]     Drug overdose, multiple drugs 3/17/2015    Eczema of hand 2012    ETOH abuse 2011    Hepatitis C 3/1/15    Heroin withdrawal ([facility] [address]     MRSA infection 14    Suicidal ideation        SURGICAL HISTORY     Past Surgical History:   Procedure Laterality Date    ABSCESS DRAINAGE Left 2017    I&D Left forearm deep abscess, VAC application    BREAST SURGERY       SECTION         CURRENTMEDICATIONS       Discharge Medication List as of 2022 10:28 PM        CONTINUE these medications which have NOT CHANGED    Details   ASPIRIN LOW DOSE 81 MG chewable tablet CHEW ONE TABLET BY MOUTH TWICE A DAY UNTIL OTHERWISE INSTRUCTED BY YOUR ORTHOPEDIC DOCTOR, Disp-30 tablet, R-0Normal      gabapentin (NEURONTIN) 600 MG tablet 1 tab po tid, Disp-90 tablet, R-2Normal      tiZANidine (ZANAFLEX) 4 MG tablet TAKE ONE TABLET BY MOUTH THREE TIMES A DAY, Disp-270 tablet, R-1Normal      valACYclovir (VALTREX) 500 MG tablet Po daily, Disp-30 tablet, R-2Normal      acetaminophen (TYLENOL) 500 MG tablet Take 1 tablet by mouth 3 times daily as needed for Pain, Disp-90 tablet, R-5, DAWNormal      mometasone-formoterol (DULERA) 100-5 MCG/ACT inhaler INHALE TWO PUFFS BY MOUTH TWICE A DAY, Disp-13 g, R-2Normal      escitalopram (LEXAPRO) 20 MG tablet Take 1 tablet by mouth daily, Disp-30 tablet, R-3Normal      furosemide (LASIX) 20 MG tablet Take 1 tablet by mouth 2 times daily, Disp-60 tablet, R-3Normal      cariprazine hcl (VRAYLAR) 3 MG CAPS capsule Take 1 capsule by mouth daily, Disp-30 capsule, R-2Normal      omeprazole (PRILOSEC) 40 MG delayed release capsule Take 1 capsule by mouth every morning (before breakfast), Disp-30 capsule, R-5Normal      ondansetron (ZOFRAN) 4 MG tablet Take 1 tablet by mouth 3 times daily as needed for Nausea or Vomiting, Disp-30 tablet, R-0Normal      levothyroxine (SYNTHROID) 50 MCG tablet Take 1 tablet by mouth daily, Disp-30 tablet, R-5Normal      vitamin D (ERGOCALCIFEROL) 1.25 MG (69998 UT) CAPS capsule Take 1 capsule by mouth once a week, Disp-4 capsule, R-5Normal      albuterol sulfate HFA (VENTOLIN HFA) 108 (90 Base) MCG/ACT inhaler Inhale 2 puffs into the lungs 4 times daily as needed for Wheezing, Disp-1 each, R-2Normal      lidocaine viscous hcl (XYLOCAINE) 2 % SOLN solution Take 15 mLs by mouth as needed for Irritation, Disp-1 each, R-0Normal      ipratropium-albuterol (DUONEB) 0.5-2.5 (3) MG/3ML SOLN nebulizer solution Inhale 3 mLs into the lungs every 4 hours, Disp-360 mL,R-0Normal      methadone (DOLOPHINE) 10 MG tablet Take 90 mg by mouth every morning . Historical Med             ALLERGIES     Bactrim [sulfamethoxazole-trimethoprim] and Flagyl [metronidazole]    FAMILYHISTORY     No family history on file. SOCIAL HISTORY       Social History     Socioeconomic History    Marital status:    Tobacco Use    Smoking status: Every Day     Packs/day: 0.50     Types: Cigarettes    Smokeless tobacco: Never   Vaping Use    Vaping Use: Never used   Substance and Sexual Activity    Alcohol use: Yes     Alcohol/week: 0.8 standard drinks     Types: 1 Standard drinks or equivalent per week     Comment: social    Drug use: Yes     Types: IV, Marijuana Maribell Amble)     Comment: heroin    Sexual activity: Yes     Partners: Male     Comment: pt states gram/day. SCREENINGS    [place] Coma Scale  Eye Opening: Spontaneous  Best Verbal Response: Oriented  Best Motor Response: Obeys commands  [place] Coma Scale Score: 15        PHYSICAL EXAM    (up to 7 for level 4, 8 or more for level 5)     ED Triage Vitals [07/22/22 1804]   BP Temp Temp Source Heart Rate Resp SpO2 Height Weight   135/83 98.2 °F (36.8 °C) Oral 86 17 98 % -- --       Physical Exam  Constitutional:       General: She is not in acute distress. Appearance: Normal appearance. She is not ill-appearing, toxic-appearing or diaphoretic. Comments: anxious   HENT:      Head: Normocephalic and atraumatic. Right Ear: External ear normal.      Left Ear: External ear normal.      Nose: Nose normal.   Eyes:      General:         Right eye: No discharge. Left eye: No discharge. Pulmonary:      Effort: Pulmonary effort is normal. No respiratory distress. Musculoskeletal:         General: Normal range of motion. Cervical back: Normal range of motion. Comments: Edema of right ankle, foot --dorsalis pedis pulse 2+, intact  Normal capillary refill of all 5 digits  Small abrasion with minimal surrounding erythema   Skin:     General: Skin is warm and dry.    Neurological: General: No focal deficit present. Mental Status: She is alert and oriented to person, place, and time. Psychiatric:         Mood and Affect: Mood normal.         Behavior: Behavior normal.           DIAGNOSTIC RESULTS   LABS:    Labs Reviewed   CBC WITH AUTO DIFFERENTIAL - Abnormal; Notable for the following components:       Result Value    RDW 22.3 (*)     Platelets 59 (*)     All other components within normal limits   BASIC METABOLIC PANEL - Abnormal; Notable for the following components:    Sodium 146 (*)     BUN 4 (*)     All other components within normal limits   HCG, SERUM, QUALITATIVE   URINALYSIS WITH MICROSCOPIC       When ordered, only abnormal lab results are displayed. All other labs were within normal range or not returned as of this dictation. EKG: When ordered, EKG's are interpreted by the Emergency Department Physician in the absence of a cardiologist.  Please see their note for interpretation of EKG. RADIOLOGY:   Non-plain film images such as CT, Ultrasound and MRI are read by the radiologist. Plain radiographic images are visualized andpreliminarily interpreted by the  ED Provider with the below findings:        Interpretation perthe Radiologist below, if available at the time of this note:    XR ANKLE RIGHT (MIN 3 VIEWS)   Final Result      1. Fractures of the calcaneus, some which are less distinct suggesting a   degree of interval healing. However a prominent fracture site involving the   inferior body the calcaneus shows nonhealing in there may be some interval   flattening of Boehler's angle since the prior study. 2.  No acute fracture noted. 3.  Diffuse ankle soft tissue swelling, most evident laterally. XR FOOT RIGHT (MIN 3 VIEWS)   Final Result      1. Calcaneal fractures, incompletely healed, and discussed in further detail   under the right ankle dictation. 2.  No acute fractures are noted.       3.  Some heterogeneous density of the midfoot bones possibly due to a degree   of disuse osteopenia. 4.  Otherwise unchanged. No results found. PROCEDURES   Unless otherwise noted below, none     Procedures    CRITICAL CARE TIME   N/A    CONSULTS:  None      EMERGENCY DEPARTMENT COURSE and DIFFERENTIAL DIAGNOSIS/MDM:   Vitals:    Vitals:    07/22/22 1804   BP: 135/83   Pulse: 86   Resp: 17   Temp: 98.2 °F (36.8 °C)   TempSrc: Oral   SpO2: 98%       Patient was given thefollowing medications:  Medications - No data to display      Is this patient to be included in the SEP-1 Core Measure due to severe sepsis or septic shock? No   Exclusion criteria - the patient is NOT to be included for SEP-1 Core Measure due to: Infection is not suspected    This is a 51-year-old female who with PMH of smoking history, calcaneal fracture diagnosed 04/2022, complaining of right foot pain, vaginal bleeding. Vitals upon arrival are within normal limits. A repeat x-ray was done of the right foot and ankle and shows the original fracture and incomplete healing. This is not surprising as patient is noncompliant with her boot and nonweightbearing. Additionally she has some vaginal bleeding. Her CBC shows an H&H that is within normal limits. I did not discuss these findings with patient as she eloped prior to treatment. FINAL IMPRESSION      1. Closed nondisplaced fracture of right calcaneus, unspecified portion of calcaneus, initial encounter    2. Vaginal bleeding    3. Anxiety          DISPOSITION/PLAN   DISPOSITION Eloped - Left Before Treatment Complete 07/22/2022 09:22:17 PM      PATIENT REFERREDTO:  No follow-up provider specified.     DISCHARGE MEDICATIONS:  Discharge Medication List as of 7/22/2022 10:28 PM          DISCONTINUED MEDICATIONS:  Discharge Medication List as of 7/22/2022 10:28 PM                 (Please note that portions ofthis note were completed with a voice recognition program.  Efforts were made to edit the dictations but occasionally words are mis-transcribed.)    Eduardo Jack PA-C (electronically signed)              Eduardo Jack PA-C  07/23/22 [id number]

## 2023-01-21 ENCOUNTER — HOSPITAL ENCOUNTER (EMERGENCY)
Age: 48
Discharge: HOME | End: 2023-01-21
Payer: COMMERCIAL

## 2023-01-21 VITALS
DIASTOLIC BLOOD PRESSURE: 89 MMHG | SYSTOLIC BLOOD PRESSURE: 175 MMHG | WEIGHT: 149.25 LBS | RESPIRATION RATE: 18 BRPM | HEART RATE: 79 BPM | TEMPERATURE: 98 F | BODY MASS INDEX: 23.43 KG/M2 | OXYGEN SATURATION: 100 % | HEIGHT: 67 IN

## 2023-01-21 DIAGNOSIS — B95.8 STAPH INFECTION: ICD-10-CM

## 2023-01-21 DIAGNOSIS — R21 RASH AND OTHER NONSPECIFIC SKIN ERUPTION: Primary | ICD-10-CM

## 2023-01-21 PROCEDURE — 99283 EMERGENCY DEPT VISIT LOW MDM: CPT

## 2023-01-21 RX ORDER — PERMETHRIN 50 MG/G
CREAM TOPICAL
Qty: 120 G | Refills: 0 | Status: SHIPPED | OUTPATIENT
Start: 2023-01-21

## 2023-01-21 RX ORDER — CLINDAMYCIN HYDROCHLORIDE 300 MG/1
300 CAPSULE ORAL 3 TIMES DAILY
Qty: 30 CAPSULE | Refills: 0 | Status: SHIPPED | OUTPATIENT
Start: 2023-01-21 | End: 2023-01-31

## 2023-01-21 RX ORDER — CLINDAMYCIN PHOSPHATE 10 UG/ML
LOTION TOPICAL
Qty: 120 ML | Refills: 2 | Status: SHIPPED | OUTPATIENT
Start: 2023-01-21 | End: 2023-02-20

## 2023-01-21 RX ORDER — HYDROXYZINE HYDROCHLORIDE 25 MG/1
25 TABLET, FILM COATED ORAL 3 TIMES DAILY PRN
Qty: 30 TABLET | Refills: 0 | Status: SHIPPED | OUTPATIENT
Start: 2023-01-21 | End: 2023-01-31

## 2023-01-21 ASSESSMENT — ENCOUNTER SYMPTOMS
VOMITING: 0
BACK PAIN: 0
NAUSEA: 0
SORE THROAT: 0
ABDOMINAL PAIN: 0
COUGH: 0
SHORTNESS OF BREATH: 0
EYE PAIN: 0

## 2023-01-21 ASSESSMENT — PAIN - FUNCTIONAL ASSESSMENT: PAIN_FUNCTIONAL_ASSESSMENT: NONE - DENIES PAIN

## 2023-01-21 NOTE — ED PROVIDER NOTES
**ADVANCED PRACTICE PROVIDER, I HAVE EVALUATED THIS PATIENT**        [address]      Pt Name: Remington Valadez  XIK:[id number]  [place] 1975  Date of evaluation: 1/21/2023  Provider: Coralee Gaucher, PA-C  Note Started: 10:54 AM EST 1/21/2023        Chief Complaint:    Chief Complaint   Patient presents with    Rash     Pt with open wounds to skin. Pt states she is pulling bugs off of skin. Nursing Notes, Past Medical Hx, Past Surgical Hx, Social Hx, Allergies, and Family Hx were all reviewed and agreed with or any disagreements were addressed in the HPI.    HPI: (Location, Duration, Timing, Severity, Quality, Assoc Sx, Context, Modifying factors)    History From: Patient      Chief Complaint of rash    This is a  52 y.o. female who presents to the emergency room with complaint of rash and itching. She is here with a sexual partner has similar rash. Denies fever. No nausea vomiting. She denies any drug use. Says 3 weeks ago she did use permethrin cream did not go away. They have washed all her laundry. She thinks she may have head lice. Says when she look at the hair on her brush CCD's black bugs on it. Says she is pulling black bugs out of her skin. She denies chest pain, no weakness, no lightheaded or dizziness. She states she has been taking sertraline for the itching and is not helping. Rash is located on her face, arms, upper chest and back area. Even a few scabs on her scalp. No other complaints. Think it may be lice.     PastMedical/Surgical History:      Diagnosis Date    Asthma     Depression     Drug dependence ([address]     Drug overdose, multiple drugs 3/17/2015    Eczema of hand 11/28/2012    ETOH abuse 12/21/2011    Hepatitis C 3/1/15    Heroin withdrawal ([address]     MRSA infection 5/6/14    Suicidal ideation          Procedure Laterality Date    ABSCESS DRAINAGE Left 11/17/2017    I&D Left forearm deep abscess, VAC application    BREAST SURGERY       SECTION         Medications:  Previous Medications    ACETAMINOPHEN (TYLENOL) 500 MG TABLET    Take 1 tablet by mouth 3 times daily as needed for Pain    ALBUTEROL SULFATE HFA (VENTOLIN HFA) 108 (90 BASE) MCG/ACT INHALER    Inhale 2 puffs into the lungs 4 times daily as needed for Wheezing    ASPIRIN (ASPIRIN LOW DOSE) 81 MG CHEWABLE TABLET    CHEW ONE TABLET BY MOUTH TWICE A DAY UNTIL OTHERWISE INSTRUCTED BY YOUR ORTHOPEDIC DOCTOR    CARIPRAZINE HCL (VRAYLAR) 3 MG CAPS CAPSULE    Take 1 capsule by mouth daily    ESCITALOPRAM (LEXAPRO) 20 MG TABLET    Take 1 tablet by mouth daily    FUROSEMIDE (LASIX) 20 MG TABLET    Take 1 tablet by mouth 2 times daily    FUROSEMIDE (LASIX) 20 MG TABLET    Take 1 tablet by mouth 2 times daily    GABAPENTIN (NEURONTIN) 600 MG TABLET    1 tab po tid    IPRATROPIUM-ALBUTEROL (DUONEB) 0.5-2.5 (3) MG/3ML SOLN NEBULIZER SOLUTION    Inhale 3 mLs into the lungs every 4 hours    LEVOTHYROXINE (SYNTHROID) 50 MCG TABLET    Take 1 tablet by mouth daily    LIDOCAINE VISCOUS HCL (XYLOCAINE) 2 % SOLN SOLUTION    Take 15 mLs by mouth as needed for Irritation    METHADONE (DOLOPHINE) 10 MG TABLET    Take 90 mg by mouth every morning .     MOMETASONE-FORMOTEROL (DULERA) 100-5 MCG/ACT INHALER    INHALE TWO PUFFS BY MOUTH TWICE A DAY    OMEPRAZOLE (PRILOSEC) 40 MG DELAYED RELEASE CAPSULE    Take 1 capsule by mouth every morning (before breakfast)    ONDANSETRON (ZOFRAN) 4 MG TABLET    Take 1 tablet by mouth 3 times daily as needed for Nausea or Vomiting    PERMETHRIN (ELIMITE) 5 % CREAM    Apply topically as directed    PERMETHRIN (ELIMITE) 5 % CREAM    Apply topically as directed    TIZANIDINE (ZANAFLEX) 4 MG TABLET    TAKE ONE TABLET BY MOUTH THREE TIMES A DAY    VALACYCLOVIR (VALTREX) 500 MG TABLET    Po daily    VITAMIN D (ERGOCALCIFEROL) 1.25 MG (94288 UT) CAPS CAPSULE    Take 1 capsule by mouth once a week       Review of Systems:  (1 systems needed)  Review of Systems   Constitutional:  Negative for chills and fever. HENT:  Negative for congestion and sore throat. Eyes:  Negative for pain and visual disturbance. Respiratory:  Negative for cough and shortness of breath. Cardiovascular:  Negative for chest pain and leg swelling. Gastrointestinal:  Negative for abdominal pain, nausea and vomiting. Genitourinary:  Negative for dysuria and frequency. Musculoskeletal:  Negative for back pain and neck pain. Skin:  Positive for rash. Negative for wound. Neurological:  Negative for dizziness and light-headedness. \"Positives and Pertinent negatives as per HPI\"    Physical Exam:  Physical Exam  Vitals and nursing note reviewed. Constitutional:       Appearance: She is well-developed. She is not diaphoretic. HENT:      Head: Normocephalic and atraumatic. Nose: Nose normal.      Mouth/Throat:      Mouth: Mucous membranes are moist.      Pharynx: Oropharynx is clear. No oropharyngeal exudate or posterior oropharyngeal erythema. Eyes:      General:         Right eye: No discharge. Left eye: No discharge. Cardiovascular:      Rate and Rhythm: Normal rate and regular rhythm. Heart sounds: Normal heart sounds. No murmur heard. No friction rub. No gallop. Pulmonary:      Effort: Pulmonary effort is normal. No respiratory distress. Breath sounds: Normal breath sounds. No wheezing or rales. Chest:      Chest wall: No tenderness. Musculoskeletal:         General: Normal range of motion. Cervical back: Normal range of motion and neck supple. Skin:     General: Skin is warm and dry. Findings: Rash present. Rash is macular and papular. Neurological:      General: No focal deficit present. Mental Status: She is alert and oriented to person, place, and time.    Psychiatric:         Behavior: Behavior normal.       MEDICAL DECISION MAKING    Vitals:    Vitals:    01/21/23 1020 01/21/23 1025 BP: (!) 175/89    Pulse: 79    Resp: 18    Temp: 98 °F (36.7 °C) 98 °F (36.7 °C)   TempSrc: Oral Tympanic   SpO2: 100%    Weight: 149 lb 4 oz (67.7 kg)    Height: 5' 7\" (1.702 m)        LABS:Labs Reviewed - No data to display     Remainder of labs reviewed and were negative at this time or not returned at the time of this note. RADIOLOGY:   Non-plain film images such as CT, Ultrasound and MRI are read by the radiologist. Landon Long PA-C have directly visualized the radiologic plain film image(s) with the below findings:      Interpretation per the Radiologist below, if available at the time of this note:    No orders to display     No results found. No results found. MEDICAL DECISION MAKING / ED COURSE:      PROCEDURES:   Procedures    None    Patient was given:  Medications - No data to display    CONSULTS: (Who and What was discussed)  None          Chronic Conditions affecting care:    has a past medical history of Asthma, Depression, Drug dependence ([facility] [address] Drug overdose, multiple drugs (3/17/2015), Eczema of hand (11/28/2012), ETOH abuse (12/21/2011), Hepatitis C (3/1/15), Heroin withdrawal ([facility] [address] MRSA infection (5/6/14), and Suicidal ideation. Records Reviewed( Source)     CC/HPI Summary, DDx, ED Course, and Reassessment:     Emergency room course: Patient on exam throat is clear. Nonerythematous no exudate. Cardiovascular regular rhythm, lungs are clear. No wheeze rales or rhonchi noted. Patient has these generalized maculopapular scabby rash on her arms face, upper chest and back area. No surrounding erythema. No active drainage. Pruritic. No excoriation nonscaly. Definitely not urticaria appearing. See photograph below. At this time I did inform the patient I will put her on a permethrin cream let her try that once more. But I do believe this is more of a staph infection. I will put her on clindamycin. Give her clindamycin lotion.   And refer her to infectious disease for possible MRSA. Rule out. She is okay with this plan. I will also put her on Atarax for the itching inform her if she used the Atarax do not take the sertraline. Disposition Considerations (Tests not ordered but considered, Shared Decision Making, Pt Expectation of Test or Tx.):   See discussion above. The patient tolerated their visit well. I evaluated the patient. The physician was available for consultation as needed. The patient and / or the family were informed of the results of any tests, a time was given to answer questions, a plan was proposed and they agreed with plan. I am the Primary Clinician of Record. CLINICAL IMPRESSION:  1. Rash and other nonspecific skin eruption    2. Staph infection        DISPOSITION Decision To Discharge 01/21/2023 11:00:28 AM      PATIENT REFERRED TO:  Kim Rossi MD  [address] [facility]  [facility] [id number]    Call in 2 days      Roseann Narvaez MD  [address] [facility] [address] [phone]    Call in 2 days      DISCHARGE MEDICATIONS:  New Prescriptions    CLINDAMYCIN (CLEOCIN) 300 MG CAPSULE    Take 1 capsule by mouth 3 times daily for 10 days    CLINDAMYCIN (CLEOCIN-T) 1 % LOTION    Apply topically 2 times daily. HYDROXYZINE HCL (ATARAX) 25 MG TABLET    Take 1 tablet by mouth 3 times daily as needed for Itching May sub Vistaril. PERMETHRIN (ELIMITE) 5 % CREAM    Apply topically as directed. Repeat in 1 week.        DISCONTINUED MEDICATIONS:  Discontinued Medications    No medications on file              (Please note the MDM and HPI sections of this note were completed with a voice recognition program.  Efforts were made to edit the dictations but occasionally words are mis-transcribed.)    Electronically signed, Mickey Avendaño PA-C,          Mickey Avendaño PA-C  01/21/23 [address] [facility], [id number]  01/21/23 1408

## 2023-01-21 NOTE — DISCHARGE INSTRUCTIONS
Take prescribed medication as prescribed only  Do not take the sertraline if you decide to use the Atarax for itching.   Follow-up with infectious disease Dr. Maggie Macedo your primary care provider  Return for any worsening

## 2024-01-01 ENCOUNTER — HOSPITAL ENCOUNTER (EMERGENCY)
Age: 49
End: 2024-05-15
Attending: EMERGENCY MEDICINE
Payer: COMMERCIAL

## 2024-01-01 DIAGNOSIS — I46.9 CARDIOPULMONARY ARREST (HCC): Primary | ICD-10-CM

## 2024-01-01 LAB
GLUCOSE BLD-MCNC: 247 MG/DL (ref 70–99)
PERFORMED ON: ABNORMAL

## 2024-01-01 PROCEDURE — 31500 INSERT EMERGENCY AIRWAY: CPT

## 2024-01-01 PROCEDURE — 99285 EMERGENCY DEPT VISIT HI MDM: CPT

## 2024-01-01 PROCEDURE — 92950 HEART/LUNG RESUSCITATION CPR: CPT

## 2024-01-01 PROCEDURE — 99283 EMERGENCY DEPT VISIT LOW MDM: CPT

## 2024-01-01 PROCEDURE — 6360000002 HC RX W HCPCS: Performed by: EMERGENCY MEDICINE

## 2024-01-01 PROCEDURE — 2500000003 HC RX 250 WO HCPCS: Performed by: EMERGENCY MEDICINE

## 2024-01-01 RX ORDER — NALOXONE HYDROCHLORIDE 0.4 MG/ML
INJECTION, SOLUTION INTRAMUSCULAR; INTRAVENOUS; SUBCUTANEOUS DAILY PRN
Status: COMPLETED | OUTPATIENT
Start: 2024-01-01 | End: 2024-01-01

## 2024-01-01 RX ORDER — EPINEPHRINE IN SOD CHLOR,ISO 1 MG/10 ML
SYRINGE (ML) INTRAVENOUS DAILY PRN
Status: COMPLETED | OUTPATIENT
Start: 2024-01-01 | End: 2024-01-01

## 2024-01-01 RX ORDER — CALCIUM CHLORIDE 100 MG/ML
INJECTION INTRAVENOUS; INTRAVENTRICULAR DAILY PRN
Status: COMPLETED | OUTPATIENT
Start: 2024-01-01 | End: 2024-01-01

## 2024-01-01 RX ADMIN — NALOXONE HYDROCHLORIDE 0.2 MG: 0.4 INJECTION, SOLUTION INTRAMUSCULAR; INTRAVENOUS; SUBCUTANEOUS at 21:09

## 2024-01-01 RX ADMIN — EPINEPHRINE 1 MG: 0.1 INJECTION INTRACARDIAC; INTRAVENOUS at 21:06

## 2024-01-01 RX ADMIN — SODIUM BICARBONATE 50 MEQ: 84 INJECTION INTRAVENOUS at 21:07

## 2024-01-01 RX ADMIN — EPINEPHRINE 1 MG: 0.1 INJECTION INTRACARDIAC; INTRAVENOUS at 21:10

## 2024-01-01 RX ADMIN — CALCIUM CHLORIDE 1000 MG: 100 INJECTION, SOLUTION INTRAVENOUS; INTRAVENTRICULAR at 21:10

## 2024-01-24 ENCOUNTER — APPOINTMENT (OUTPATIENT)
Dept: GENERAL RADIOLOGY | Age: 49
End: 2024-01-24
Payer: COMMERCIAL

## 2024-01-24 ENCOUNTER — APPOINTMENT (OUTPATIENT)
Dept: CT IMAGING | Age: 49
End: 2024-01-24
Payer: COMMERCIAL

## 2024-01-24 ENCOUNTER — HOSPITAL ENCOUNTER (INPATIENT)
Age: 49
LOS: 7 days | Discharge: HOME | End: 2024-01-31
Attending: INTERNAL MEDICINE | Admitting: INTERNAL MEDICINE
Payer: COMMERCIAL

## 2024-01-24 DIAGNOSIS — R10.84 GENERALIZED ABDOMINAL PAIN: ICD-10-CM

## 2024-01-24 DIAGNOSIS — R79.89 ELEVATED LFTS: ICD-10-CM

## 2024-01-24 DIAGNOSIS — R93.5 ABNORMAL CT OF THE ABDOMEN: ICD-10-CM

## 2024-01-24 DIAGNOSIS — F10.939 ALCOHOL WITHDRAWAL SYNDROME WITH COMPLICATION (HCC): Primary | ICD-10-CM

## 2024-01-24 DIAGNOSIS — R10.9 ABDOMINAL PAIN, UNSPECIFIED ABDOMINAL LOCATION: ICD-10-CM

## 2024-01-24 DIAGNOSIS — R11.2 NAUSEA AND VOMITING, UNSPECIFIED VOMITING TYPE: ICD-10-CM

## 2024-01-24 DIAGNOSIS — R94.31 QT PROLONGATION: ICD-10-CM

## 2024-01-24 LAB
ALBUMIN SERPL-MCNC: 4.2 G/DL (ref 3.4–5)
ALBUMIN/GLOB SERPL: 1.5 {RATIO} (ref 1.1–2.2)
ALP SERPL-CCNC: 214 U/L (ref 40–129)
ALT SERPL-CCNC: 64 U/L (ref 10–40)
ANION GAP SERPL CALCULATED.3IONS-SCNC: 14 MMOL/L (ref 3–16)
AST SERPL-CCNC: 159 U/L (ref 15–37)
BASOPHILS # BLD: 0.1 K/UL (ref 0–0.2)
BASOPHILS NFR BLD: 1.1 %
BILIRUB SERPL-MCNC: 2.1 MG/DL (ref 0–1)
BILIRUB UR QL STRIP.AUTO: NEGATIVE
BUN SERPL-MCNC: 7 MG/DL (ref 7–20)
CALCIUM SERPL-MCNC: 9.1 MG/DL (ref 8.3–10.6)
CHLORIDE SERPL-SCNC: 97 MMOL/L (ref 99–110)
CLARITY UR: CLEAR
CO2 SERPL-SCNC: 27 MMOL/L (ref 21–32)
COLOR UR: YELLOW
CREAT SERPL-MCNC: 0.6 MG/DL (ref 0.6–1.1)
DEPRECATED RDW RBC AUTO: 19.6 % (ref 12.4–15.4)
EOSINOPHIL # BLD: 0.1 K/UL (ref 0–0.6)
EOSINOPHIL NFR BLD: 1 %
ETHANOLAMINE SERPL-MCNC: NORMAL MG/DL (ref 0–0.08)
GFR SERPLBLD CREATININE-BSD FMLA CKD-EPI: >60 ML/MIN/{1.73_M2}
GLUCOSE SERPL-MCNC: 97 MG/DL (ref 70–99)
GLUCOSE UR STRIP.AUTO-MCNC: NEGATIVE MG/DL
HCG SERPL QL: NEGATIVE
HCT VFR BLD AUTO: 34.8 % (ref 36–48)
HGB BLD-MCNC: 11.3 G/DL (ref 12–16)
HGB UR QL STRIP.AUTO: NEGATIVE
INR PPP: 1.26 (ref 0.84–1.16)
KETONES UR STRIP.AUTO-MCNC: 15 MG/DL
LACTATE BLDV-SCNC: 1.8 MMOL/L (ref 0.4–2)
LEUKOCYTE ESTERASE UR QL STRIP.AUTO: NEGATIVE
LIPASE SERPL-CCNC: 21 U/L (ref 13–60)
LYMPHOCYTES # BLD: 1.9 K/UL (ref 1–5.1)
LYMPHOCYTES NFR BLD: 29.7 %
MAGNESIUM SERPL-MCNC: 1.2 MG/DL (ref 1.8–2.4)
MCH RBC QN AUTO: 25.7 PG (ref 26–34)
MCHC RBC AUTO-ENTMCNC: 32.5 G/DL (ref 31–36)
MCV RBC AUTO: 79.1 FL (ref 80–100)
MONOCYTES # BLD: 0.9 K/UL (ref 0–1.3)
MONOCYTES NFR BLD: 13.3 %
NEUTROPHILS # BLD: 3.6 K/UL (ref 1.7–7.7)
NEUTROPHILS NFR BLD: 54.9 %
NITRITE UR QL STRIP.AUTO: NEGATIVE
PH UR STRIP.AUTO: 8 [PH] (ref 5–8)
PLATELET # BLD AUTO: 60 K/UL (ref 135–450)
PMV BLD AUTO: 6.9 FL (ref 5–10.5)
POTASSIUM SERPL-SCNC: 3.3 MMOL/L (ref 3.5–5.1)
PROT SERPL-MCNC: 7 G/DL (ref 6.4–8.2)
PROT UR STRIP.AUTO-MCNC: NEGATIVE MG/DL
PROTHROMBIN TIME: 15.8 SEC (ref 11.5–14.8)
RBC # BLD AUTO: 4.4 M/UL (ref 4–5.2)
SODIUM SERPL-SCNC: 138 MMOL/L (ref 136–145)
SP GR UR STRIP.AUTO: 1.03 (ref 1–1.03)
TROPONIN, HIGH SENSITIVITY: 10 NG/L (ref 0–14)
TSH SERPL DL<=0.005 MIU/L-ACNC: 2.97 UIU/ML (ref 0.27–4.2)
UA COMPLETE W REFLEX CULTURE PNL UR: ABNORMAL
UA DIPSTICK W REFLEX MICRO PNL UR: ABNORMAL
URN SPEC COLLECT METH UR: ABNORMAL
UROBILINOGEN UR STRIP-ACNC: 1 E.U./DL
WBC # BLD AUTO: 6.5 K/UL (ref 4–11)

## 2024-01-24 PROCEDURE — 83735 ASSAY OF MAGNESIUM: CPT

## 2024-01-24 PROCEDURE — 84443 ASSAY THYROID STIM HORMONE: CPT

## 2024-01-24 PROCEDURE — 2580000003 HC RX 258: Performed by: PHYSICIAN ASSISTANT

## 2024-01-24 PROCEDURE — 85025 COMPLETE CBC W/AUTO DIFF WBC: CPT

## 2024-01-24 PROCEDURE — 96374 THER/PROPH/DIAG INJ IV PUSH: CPT

## 2024-01-24 PROCEDURE — 96361 HYDRATE IV INFUSION ADD-ON: CPT

## 2024-01-24 PROCEDURE — 74176 CT ABD & PELVIS W/O CONTRAST: CPT

## 2024-01-24 PROCEDURE — 83605 ASSAY OF LACTIC ACID: CPT

## 2024-01-24 PROCEDURE — 84703 CHORIONIC GONADOTROPIN ASSAY: CPT

## 2024-01-24 PROCEDURE — 83690 ASSAY OF LIPASE: CPT

## 2024-01-24 PROCEDURE — 6370000000 HC RX 637 (ALT 250 FOR IP): Performed by: INTERNAL MEDICINE

## 2024-01-24 PROCEDURE — 80053 COMPREHEN METABOLIC PANEL: CPT

## 2024-01-24 PROCEDURE — 82077 ASSAY SPEC XCP UR&BREATH IA: CPT

## 2024-01-24 PROCEDURE — 81003 URINALYSIS AUTO W/O SCOPE: CPT

## 2024-01-24 PROCEDURE — 6360000002 HC RX W HCPCS: Performed by: INTERNAL MEDICINE

## 2024-01-24 PROCEDURE — 85610 PROTHROMBIN TIME: CPT

## 2024-01-24 PROCEDURE — 93005 ELECTROCARDIOGRAM TRACING: CPT | Performed by: INTERNAL MEDICINE

## 2024-01-24 PROCEDURE — 6360000004 HC RX CONTRAST MEDICATION: Performed by: PHYSICIAN ASSISTANT

## 2024-01-24 PROCEDURE — 80307 DRUG TEST PRSMV CHEM ANLYZR: CPT

## 2024-01-24 PROCEDURE — 84484 ASSAY OF TROPONIN QUANT: CPT

## 2024-01-24 PROCEDURE — 96375 TX/PRO/DX INJ NEW DRUG ADDON: CPT

## 2024-01-24 PROCEDURE — 99285 EMERGENCY DEPT VISIT HI MDM: CPT

## 2024-01-24 PROCEDURE — 71046 X-RAY EXAM CHEST 2 VIEWS: CPT

## 2024-01-24 PROCEDURE — 1200000000 HC SEMI PRIVATE

## 2024-01-24 PROCEDURE — 6360000002 HC RX W HCPCS: Performed by: PHYSICIAN ASSISTANT

## 2024-01-24 RX ORDER — METOCLOPRAMIDE HYDROCHLORIDE 5 MG/ML
5 INJECTION INTRAMUSCULAR; INTRAVENOUS ONCE
Status: COMPLETED | OUTPATIENT
Start: 2024-01-24 | End: 2024-01-24

## 2024-01-24 RX ORDER — 0.9 % SODIUM CHLORIDE 0.9 %
30 INTRAVENOUS SOLUTION INTRAVENOUS ONCE
Status: COMPLETED | OUTPATIENT
Start: 2024-01-24 | End: 2024-01-24

## 2024-01-24 RX ORDER — LORAZEPAM 2 MG/ML
1 INJECTION INTRAMUSCULAR ONCE
Status: COMPLETED | OUTPATIENT
Start: 2024-01-24 | End: 2024-01-24

## 2024-01-24 RX ORDER — DIPHENHYDRAMINE HYDROCHLORIDE 50 MG/ML
12.5 INJECTION INTRAMUSCULAR; INTRAVENOUS ONCE
Status: COMPLETED | OUTPATIENT
Start: 2024-01-24 | End: 2024-01-24

## 2024-01-24 RX ORDER — MAGNESIUM SULFATE IN WATER 40 MG/ML
2000 INJECTION, SOLUTION INTRAVENOUS ONCE
Status: COMPLETED | OUTPATIENT
Start: 2024-01-24 | End: 2024-01-25

## 2024-01-24 RX ADMIN — METOCLOPRAMIDE 5 MG: 5 INJECTION, SOLUTION INTRAMUSCULAR; INTRAVENOUS at 18:35

## 2024-01-24 RX ADMIN — MAGNESIUM SULFATE HEPTAHYDRATE 2000 MG: 40 INJECTION, SOLUTION INTRAVENOUS at 23:23

## 2024-01-24 RX ADMIN — POTASSIUM BICARBONATE 40 MEQ: 391 TABLET, EFFERVESCENT ORAL at 23:23

## 2024-01-24 RX ADMIN — LORAZEPAM 1 MG: 2 INJECTION INTRAMUSCULAR; INTRAVENOUS at 18:45

## 2024-01-24 RX ADMIN — IOPAMIDOL 75 ML: 755 INJECTION, SOLUTION INTRAVENOUS at 20:10

## 2024-01-24 RX ADMIN — SODIUM CHLORIDE 1926 ML: 9 INJECTION, SOLUTION INTRAVENOUS at 18:38

## 2024-01-24 RX ADMIN — DIPHENHYDRAMINE HYDROCHLORIDE 12.5 MG: 50 INJECTION INTRAMUSCULAR; INTRAVENOUS at 18:35

## 2024-01-24 ASSESSMENT — PAIN - FUNCTIONAL ASSESSMENT
PAIN_FUNCTIONAL_ASSESSMENT: 0-10
PAIN_FUNCTIONAL_ASSESSMENT: ACTIVITIES ARE NOT PREVENTED

## 2024-01-24 ASSESSMENT — PAIN DESCRIPTION - DESCRIPTORS: DESCRIPTORS: ACHING

## 2024-01-24 ASSESSMENT — PAIN SCALES - GENERAL: PAINLEVEL_OUTOF10: 10

## 2024-01-24 ASSESSMENT — LIFESTYLE VARIABLES
HOW MANY STANDARD DRINKS CONTAINING ALCOHOL DO YOU HAVE ON A TYPICAL DAY: 7 TO 9
HOW OFTEN DO YOU HAVE A DRINK CONTAINING ALCOHOL: 4 OR MORE TIMES A WEEK

## 2024-01-24 ASSESSMENT — PAIN DESCRIPTION - LOCATION: LOCATION: MOUTH

## 2024-01-24 ASSESSMENT — PAIN DESCRIPTION - PAIN TYPE: TYPE: ACUTE PAIN

## 2024-01-24 NOTE — ED TRIAGE NOTES
Nausea/vomiting for 3-4 days. Pt has new sores all over body starting 3 days ago. Pt started having anxiety attack today with dizziness causing her to call EMS. Pt drinsk 8 servings of alcohol a day and did crack last week.

## 2024-01-24 NOTE — ED PROVIDER NOTES
[facility] EMERGENCY DEPARTMENT  EMERGENCY DEPARTMENT ENCOUNTER        Pt Name: Lali Cazares  MRN: [id number]  Birthdate 1975  Date of evaluation: 1/24/2024  Provider: COLBY Brewer  PCP: Romi Denis MD  Note Started: 6:28 PM EST 1/24/24      AMY. I have evaluated this patient.        CHIEF COMPLAINT       Chief Complaint   Patient presents with    Nausea     Nausea/vomiting for 3-4 days. Pt has new sores all over body starting 3 days ago. Pt started having anxiety attack today with dizziness causing her to call EMS. Pt drinsk 8 servings of alcohol a day and did crack last week.        HISTORY OF PRESENT ILLNESS: 1 or more Elements     History from : Patient    Limitations to history : None    Lali Cazares is a 48 y.o. female who presents complaining of nausea, vomiting, cough, generalized abdominal pain and \"anxiety.\"  She tells me that the symptoms have been going on for 3 to 4 days and that she typically drinks about 8 servings of liquor a day but has not had any since last night because of the vomiting.  She denies diarrhea or fevers.  Denies productive cough or chest pain.  She admits to crack cocaine use but says that she has not smoked any since 6 days ago.  She is a former IV drug user tells me she has been clean for a little over 6 years and on Suboxone currently.  She complains of itching with scabs and spots \"all over her body.\"  History of scabies.  She tells me she is not taking her thyroid medication.    Nursing Notes were all reviewed and agreed with or any disagreements were addressed in the HPI.    REVIEW OF SYSTEMS :      Review of Systems   Constitutional:  Positive for fatigue. Negative for fever.   Respiratory:  Positive for cough. Negative for shortness of breath.    Cardiovascular:  Negative for chest pain and leg swelling.   Gastrointestinal:  Positive for abdominal pain, nausea and vomiting. Negative for blood in stool, constipation and

## 2024-01-25 PROBLEM — F10.939 ALCOHOL WITHDRAWAL SYNDROME WITH COMPLICATION (HCC): Status: ACTIVE | Noted: 2024-01-25

## 2024-01-25 LAB
AMPHETAMINES UR QL SCN>1000 NG/ML: NORMAL
BARBITURATES UR QL SCN>200 NG/ML: NORMAL
BENZODIAZ UR QL SCN>200 NG/ML: NORMAL
CANNABINOIDS UR QL SCN>50 NG/ML: NORMAL
COCAINE UR QL SCN: NORMAL
DRUG SCREEN COMMENT UR-IMP: NORMAL
EKG ATRIAL RATE: 100 BPM
EKG DIAGNOSIS: NORMAL
EKG P AXIS: 57 DEGREES
EKG P-R INTERVAL: 112 MS
EKG Q-T INTERVAL: 398 MS
EKG QRS DURATION: 78 MS
EKG QTC CALCULATION (BAZETT): 513 MS
EKG R AXIS: 62 DEGREES
EKG T AXIS: 54 DEGREES
EKG VENTRICULAR RATE: 100 BPM
FENTANYL SCREEN, URINE: NORMAL
METHADONE UR QL SCN>300 NG/ML: NORMAL
OPIATES UR QL SCN>300 NG/ML: NORMAL
OXYCODONE UR QL SCN: NORMAL
PCP UR QL SCN>25 NG/ML: NORMAL
PH UR STRIP: 8 [PH]

## 2024-01-25 PROCEDURE — 93010 ELECTROCARDIOGRAM REPORT: CPT | Performed by: INTERNAL MEDICINE

## 2024-01-25 PROCEDURE — 6370000000 HC RX 637 (ALT 250 FOR IP): Performed by: INTERNAL MEDICINE

## 2024-01-25 PROCEDURE — 82105 ALPHA-FETOPROTEIN SERUM: CPT

## 2024-01-25 PROCEDURE — 99222 1ST HOSP IP/OBS MODERATE 55: CPT | Performed by: INTERNAL MEDICINE

## 2024-01-25 PROCEDURE — 94640 AIRWAY INHALATION TREATMENT: CPT

## 2024-01-25 PROCEDURE — 1200000000 HC SEMI PRIVATE

## 2024-01-25 PROCEDURE — 2580000003 HC RX 258: Performed by: INTERNAL MEDICINE

## 2024-01-25 PROCEDURE — 94760 N-INVAS EAR/PLS OXIMETRY 1: CPT

## 2024-01-25 PROCEDURE — 86708 HEPATITIS A ANTIBODY: CPT

## 2024-01-25 RX ORDER — IPRATROPIUM BROMIDE AND ALBUTEROL SULFATE 2.5; .5 MG/3ML; MG/3ML
1 SOLUTION RESPIRATORY (INHALATION) EVERY 4 HOURS PRN
Status: DISCONTINUED | OUTPATIENT
Start: 2024-01-25 | End: 2024-01-31 | Stop reason: HOSPADM

## 2024-01-25 RX ORDER — PROMETHAZINE HYDROCHLORIDE 25 MG/ML
6.25 INJECTION, SOLUTION INTRAMUSCULAR; INTRAVENOUS EVERY 6 HOURS PRN
Status: DISCONTINUED | OUTPATIENT
Start: 2024-01-25 | End: 2024-01-31 | Stop reason: HOSPADM

## 2024-01-25 RX ORDER — SODIUM CHLORIDE 0.9 % (FLUSH) 0.9 %
5-40 SYRINGE (ML) INJECTION PRN
Status: DISCONTINUED | OUTPATIENT
Start: 2024-01-25 | End: 2024-01-31 | Stop reason: HOSPADM

## 2024-01-25 RX ORDER — LORAZEPAM 1 MG/1
4 TABLET ORAL
Status: DISCONTINUED | OUTPATIENT
Start: 2024-01-25 | End: 2024-01-31 | Stop reason: HOSPADM

## 2024-01-25 RX ORDER — SODIUM CHLORIDE 0.9 % (FLUSH) 0.9 %
5-40 SYRINGE (ML) INJECTION EVERY 12 HOURS SCHEDULED
Status: DISCONTINUED | OUTPATIENT
Start: 2024-01-25 | End: 2024-01-31 | Stop reason: HOSPADM

## 2024-01-25 RX ORDER — LEVOTHYROXINE SODIUM 0.03 MG/1
50 TABLET ORAL DAILY
Status: DISCONTINUED | OUTPATIENT
Start: 2024-01-27 | End: 2024-01-31 | Stop reason: HOSPADM

## 2024-01-25 RX ORDER — HYDROXYZINE HYDROCHLORIDE 25 MG/1
25 TABLET, FILM COATED ORAL 3 TIMES DAILY PRN
Status: DISCONTINUED | OUTPATIENT
Start: 2024-01-25 | End: 2024-01-31 | Stop reason: HOSPADM

## 2024-01-25 RX ORDER — BUPRENORPHINE HYDROCHLORIDE AND NALOXONE HYDROCHLORIDE DIHYDRATE 2; .5 MG/1; MG/1
1 TABLET SUBLINGUAL DAILY
COMMUNITY
Start: 2024-01-19

## 2024-01-25 RX ORDER — LORAZEPAM 2 MG/ML
1 INJECTION INTRAMUSCULAR
Status: DISCONTINUED | OUTPATIENT
Start: 2024-01-25 | End: 2024-01-31 | Stop reason: HOSPADM

## 2024-01-25 RX ORDER — LORAZEPAM 2 MG/ML
3 INJECTION INTRAMUSCULAR
Status: DISCONTINUED | OUTPATIENT
Start: 2024-01-25 | End: 2024-01-31 | Stop reason: HOSPADM

## 2024-01-25 RX ORDER — METHADONE HYDROCHLORIDE 10 MG/1
90 TABLET ORAL EVERY MORNING
Status: DISCONTINUED | OUTPATIENT
Start: 2024-01-25 | End: 2024-01-26

## 2024-01-25 RX ORDER — LORAZEPAM 1 MG/1
3 TABLET ORAL
Status: DISCONTINUED | OUTPATIENT
Start: 2024-01-25 | End: 2024-01-31 | Stop reason: HOSPADM

## 2024-01-25 RX ORDER — SODIUM CHLORIDE 9 MG/ML
INJECTION, SOLUTION INTRAVENOUS CONTINUOUS
Status: DISCONTINUED | OUTPATIENT
Start: 2024-01-25 | End: 2024-01-26

## 2024-01-25 RX ORDER — FUROSEMIDE 20 MG/1
20 TABLET ORAL 2 TIMES DAILY
Status: DISCONTINUED | OUTPATIENT
Start: 2024-01-25 | End: 2024-01-31 | Stop reason: HOSPADM

## 2024-01-25 RX ORDER — GAUZE BANDAGE 2" X 2"
100 BANDAGE TOPICAL DAILY
Status: DISCONTINUED | OUTPATIENT
Start: 2024-01-25 | End: 2024-01-31 | Stop reason: HOSPADM

## 2024-01-25 RX ORDER — LORAZEPAM 2 MG/ML
4 INJECTION INTRAMUSCULAR
Status: DISCONTINUED | OUTPATIENT
Start: 2024-01-25 | End: 2024-01-31 | Stop reason: HOSPADM

## 2024-01-25 RX ORDER — GABAPENTIN 300 MG/1
300 CAPSULE ORAL 3 TIMES DAILY
Status: DISCONTINUED | OUTPATIENT
Start: 2024-01-25 | End: 2024-01-31 | Stop reason: HOSPADM

## 2024-01-25 RX ORDER — LORAZEPAM 2 MG/ML
2 INJECTION INTRAMUSCULAR
Status: DISCONTINUED | OUTPATIENT
Start: 2024-01-25 | End: 2024-01-31 | Stop reason: HOSPADM

## 2024-01-25 RX ORDER — ESCITALOPRAM OXALATE 10 MG/1
20 TABLET ORAL DAILY
Status: DISCONTINUED | OUTPATIENT
Start: 2024-01-25 | End: 2024-01-31 | Stop reason: HOSPADM

## 2024-01-25 RX ORDER — SODIUM CHLORIDE 9 MG/ML
INJECTION, SOLUTION INTRAVENOUS PRN
Status: DISCONTINUED | OUTPATIENT
Start: 2024-01-25 | End: 2024-01-31 | Stop reason: HOSPADM

## 2024-01-25 RX ORDER — LORAZEPAM 1 MG/1
1 TABLET ORAL
Status: DISCONTINUED | OUTPATIENT
Start: 2024-01-25 | End: 2024-01-31 | Stop reason: HOSPADM

## 2024-01-25 RX ORDER — LORAZEPAM 1 MG/1
2 TABLET ORAL
Status: DISCONTINUED | OUTPATIENT
Start: 2024-01-25 | End: 2024-01-31 | Stop reason: HOSPADM

## 2024-01-25 RX ORDER — PANTOPRAZOLE SODIUM 40 MG/1
40 TABLET, DELAYED RELEASE ORAL
Status: DISCONTINUED | OUTPATIENT
Start: 2024-01-25 | End: 2024-01-31 | Stop reason: HOSPADM

## 2024-01-25 RX ORDER — BUPRENORPHINE HYDROCHLORIDE AND NALOXONE HYDROCHLORIDE DIHYDRATE 8; 2 MG/1; MG/1
1 TABLET SUBLINGUAL 2 TIMES DAILY
COMMUNITY
Start: 2023-12-01

## 2024-01-25 RX ADMIN — HYDROXYZINE HYDROCHLORIDE 25 MG: 25 TABLET, FILM COATED ORAL at 01:57

## 2024-01-25 RX ADMIN — PANTOPRAZOLE SODIUM 40 MG: 40 TABLET, DELAYED RELEASE ORAL at 06:00

## 2024-01-25 RX ADMIN — FUROSEMIDE 20 MG: 20 TABLET ORAL at 08:38

## 2024-01-25 RX ADMIN — GABAPENTIN 300 MG: 300 CAPSULE ORAL at 20:20

## 2024-01-25 RX ADMIN — SODIUM CHLORIDE: 9 INJECTION, SOLUTION INTRAVENOUS at 01:12

## 2024-01-25 RX ADMIN — LORAZEPAM 1 MG: 1 TABLET ORAL at 20:20

## 2024-01-25 RX ADMIN — HYDROXYZINE HYDROCHLORIDE 25 MG: 25 TABLET, FILM COATED ORAL at 14:39

## 2024-01-25 RX ADMIN — LORAZEPAM 1 MG: 1 TABLET ORAL at 08:43

## 2024-01-25 RX ADMIN — SODIUM CHLORIDE: 9 INJECTION, SOLUTION INTRAVENOUS at 20:24

## 2024-01-25 RX ADMIN — GABAPENTIN 300 MG: 300 CAPSULE ORAL at 08:38

## 2024-01-25 RX ADMIN — SODIUM CHLORIDE, PRESERVATIVE FREE 10 ML: 5 INJECTION INTRAVENOUS at 20:20

## 2024-01-25 RX ADMIN — LORAZEPAM 1 MG: 1 TABLET ORAL at 14:39

## 2024-01-25 RX ADMIN — ESCITALOPRAM OXALATE 20 MG: 10 TABLET ORAL at 14:39

## 2024-01-25 RX ADMIN — GABAPENTIN 300 MG: 300 CAPSULE ORAL at 14:38

## 2024-01-25 RX ADMIN — Medication 100 MG: at 08:38

## 2024-01-25 RX ADMIN — MOMETASONE FUROATE AND FORMOTEROL FUMARATE DIHYDRATE 2 PUFF: 100; 5 AEROSOL RESPIRATORY (INHALATION) at 08:34

## 2024-01-25 RX ADMIN — MOMETASONE FUROATE AND FORMOTEROL FUMARATE DIHYDRATE 2 PUFF: 100; 5 AEROSOL RESPIRATORY (INHALATION) at 20:35

## 2024-01-25 ASSESSMENT — PAIN SCALES - GENERAL
PAINLEVEL_OUTOF10: 0
PAINLEVEL_OUTOF10: 0

## 2024-01-25 NOTE — ED NOTES

## 2024-01-25 NOTE — ED PROVIDER NOTES
EKG Interpretation #1    Interpreted by emergency department physician  Time performed: 1819  Time read: 1830    Rhythm: Sinus  Ventricular Rate: 100  QRS Axis: 62  Ectopy: None  Conduction: Normal sinus rhythm with prolonged QT interval at 513 ms with LVH by voltage and early repolarization abnormalities  ST Segments: Consistent with early repolarization abnormalities  T Waves: Consistent with early repolarization abnormalities  Q Waves: None noted    Other findings: Motion artifact but EKG is readable    Compared to EKG on: 5/2/2019 and appears unchanged except that her T waves are more prominent now    Clinical Impression: Normal sinus rhythm with prolonged QT interval of 513 ms with LVH by voltage and early repolarization abnormalities.  There is motion artifact but EKG is readable.  This is compared to an EKG on 5/2/2019 appears unchanged except for T waves being more prominent today.    DO Ethan HAZEL Charles K, DO  01/24/24 2046

## 2024-01-25 NOTE — CARE COORDINATION
MARTHA Consult: Addiction Treatment:  Social Attempted to see patient several time, unable to assess.    Arlene Roldan was able to meet with patient, patient is active with [facility].

## 2024-01-26 ENCOUNTER — APPOINTMENT (OUTPATIENT)
Dept: ULTRASOUND IMAGING | Age: 49
End: 2024-01-26
Payer: COMMERCIAL

## 2024-01-26 ENCOUNTER — ANESTHESIA (OUTPATIENT)
Dept: ENDOSCOPY | Age: 49
End: 2024-01-26
Payer: COMMERCIAL

## 2024-01-26 ENCOUNTER — ANESTHESIA EVENT (OUTPATIENT)
Dept: ENDOSCOPY | Age: 49
End: 2024-01-26
Payer: COMMERCIAL

## 2024-01-26 LAB
ALBUMIN SERPL-MCNC: 3.4 G/DL (ref 3.4–5)
ALBUMIN/GLOB SERPL: 1.3 {RATIO} (ref 1.1–2.2)
ALP SERPL-CCNC: 182 U/L (ref 40–129)
ALT SERPL-CCNC: 54 U/L (ref 10–40)
ANION GAP SERPL CALCULATED.3IONS-SCNC: 8 MMOL/L (ref 3–16)
AST SERPL-CCNC: 118 U/L (ref 15–37)
BILIRUB SERPL-MCNC: 1.4 MG/DL (ref 0–1)
BUN SERPL-MCNC: 8 MG/DL (ref 7–20)
CALCIUM SERPL-MCNC: 7.9 MG/DL (ref 8.3–10.6)
CHLORIDE SERPL-SCNC: 105 MMOL/L (ref 99–110)
CO2 SERPL-SCNC: 24 MMOL/L (ref 21–32)
CREAT SERPL-MCNC: <0.5 MG/DL (ref 0.6–1.1)
GFR SERPLBLD CREATININE-BSD FMLA CKD-EPI: >60 ML/MIN/{1.73_M2}
GLUCOSE SERPL-MCNC: 108 MG/DL (ref 70–99)
POTASSIUM SERPL-SCNC: 3.7 MMOL/L (ref 3.5–5.1)
PROT SERPL-MCNC: 6 G/DL (ref 6.4–8.2)
SODIUM SERPL-SCNC: 137 MMOL/L (ref 136–145)

## 2024-01-26 PROCEDURE — 7100000000 HC PACU RECOVERY - FIRST 15 MIN: Performed by: INTERNAL MEDICINE

## 2024-01-26 PROCEDURE — 7100000001 HC PACU RECOVERY - ADDTL 15 MIN: Performed by: INTERNAL MEDICINE

## 2024-01-26 PROCEDURE — 2580000003 HC RX 258: Performed by: INTERNAL MEDICINE

## 2024-01-26 PROCEDURE — 94640 AIRWAY INHALATION TREATMENT: CPT

## 2024-01-26 PROCEDURE — 2709999900 HC NON-CHARGEABLE SUPPLY: Performed by: INTERNAL MEDICINE

## 2024-01-26 PROCEDURE — 76705 ECHO EXAM OF ABDOMEN: CPT

## 2024-01-26 PROCEDURE — 3700000000 HC ANESTHESIA ATTENDED CARE: Performed by: INTERNAL MEDICINE

## 2024-01-26 PROCEDURE — 88305 TISSUE EXAM BY PATHOLOGIST: CPT

## 2024-01-26 PROCEDURE — 94760 N-INVAS EAR/PLS OXIMETRY 1: CPT

## 2024-01-26 PROCEDURE — 3700000001 HC ADD 15 MINUTES (ANESTHESIA): Performed by: INTERNAL MEDICINE

## 2024-01-26 PROCEDURE — 99232 SBSQ HOSP IP/OBS MODERATE 35: CPT | Performed by: INTERNAL MEDICINE

## 2024-01-26 PROCEDURE — 2580000003 HC RX 258

## 2024-01-26 PROCEDURE — 80053 COMPREHEN METABOLIC PANEL: CPT

## 2024-01-26 PROCEDURE — 6370000000 HC RX 637 (ALT 250 FOR IP): Performed by: INTERNAL MEDICINE

## 2024-01-26 PROCEDURE — 36415 COLL VENOUS BLD VENIPUNCTURE: CPT

## 2024-01-26 PROCEDURE — 6360000002 HC RX W HCPCS

## 2024-01-26 PROCEDURE — 2500000003 HC RX 250 WO HCPCS

## 2024-01-26 PROCEDURE — 0DB68ZX EXCISION OF STOMACH, VIA NATURAL OR ARTIFICIAL OPENING ENDOSCOPIC, DIAGNOSTIC: ICD-10-PCS | Performed by: INTERNAL MEDICINE

## 2024-01-26 PROCEDURE — 2580000003 HC RX 258: Performed by: ANESTHESIOLOGY

## 2024-01-26 PROCEDURE — 3609012400 HC EGD TRANSORAL BIOPSY SINGLE/MULTIPLE: Performed by: INTERNAL MEDICINE

## 2024-01-26 PROCEDURE — 1200000000 HC SEMI PRIVATE

## 2024-01-26 RX ORDER — BUPRENORPHINE AND NALOXONE 2; .5 MG/1; MG/1
1 FILM, SOLUBLE BUCCAL; SUBLINGUAL DAILY
Status: DISCONTINUED | OUTPATIENT
Start: 2024-01-26 | End: 2024-01-26

## 2024-01-26 RX ORDER — BUPRENORPHINE HYDROCHLORIDE AND NALOXONE HYDROCHLORIDE DIHYDRATE 2; .5 MG/1; MG/1
1 TABLET SUBLINGUAL DAILY
Status: DISCONTINUED | OUTPATIENT
Start: 2024-01-26 | End: 2024-01-31 | Stop reason: HOSPADM

## 2024-01-26 RX ORDER — PROPOFOL 10 MG/ML
INJECTION, EMULSION INTRAVENOUS PRN
Status: DISCONTINUED | OUTPATIENT
Start: 2024-01-26 | End: 2024-01-26 | Stop reason: SDUPTHER

## 2024-01-26 RX ORDER — SODIUM CHLORIDE 0.9 % (FLUSH) 0.9 %
5-40 SYRINGE (ML) INJECTION EVERY 12 HOURS SCHEDULED
Status: DISCONTINUED | OUTPATIENT
Start: 2024-01-26 | End: 2024-01-30

## 2024-01-26 RX ORDER — LIDOCAINE HYDROCHLORIDE 20 MG/ML
INJECTION, SOLUTION EPIDURAL; INFILTRATION; INTRACAUDAL; PERINEURAL PRN
Status: DISCONTINUED | OUTPATIENT
Start: 2024-01-26 | End: 2024-01-26 | Stop reason: SDUPTHER

## 2024-01-26 RX ORDER — SODIUM CHLORIDE 0.9 % (FLUSH) 0.9 %
5-40 SYRINGE (ML) INJECTION PRN
Status: DISCONTINUED | OUTPATIENT
Start: 2024-01-26 | End: 2024-01-30

## 2024-01-26 RX ORDER — SODIUM CHLORIDE 9 MG/ML
INJECTION, SOLUTION INTRAVENOUS PRN
Status: DISCONTINUED | OUTPATIENT
Start: 2024-01-26 | End: 2024-01-30

## 2024-01-26 RX ORDER — BUPRENORPHINE AND NALOXONE 8; 2 MG/1; MG/1
1 FILM, SOLUBLE BUCCAL; SUBLINGUAL 2 TIMES DAILY
Status: DISCONTINUED | OUTPATIENT
Start: 2024-01-26 | End: 2024-01-31 | Stop reason: HOSPADM

## 2024-01-26 RX ORDER — SODIUM CHLORIDE 9 MG/ML
INJECTION, SOLUTION INTRAVENOUS CONTINUOUS PRN
Status: DISCONTINUED | OUTPATIENT
Start: 2024-01-26 | End: 2024-01-26 | Stop reason: SDUPTHER

## 2024-01-26 RX ADMIN — Medication 1 LOZENGE: at 20:38

## 2024-01-26 RX ADMIN — FUROSEMIDE 20 MG: 20 TABLET ORAL at 09:22

## 2024-01-26 RX ADMIN — LORAZEPAM 1 MG: 1 TABLET ORAL at 09:28

## 2024-01-26 RX ADMIN — SODIUM CHLORIDE, PRESERVATIVE FREE 10 ML: 5 INJECTION INTRAVENOUS at 20:46

## 2024-01-26 RX ADMIN — SODIUM CHLORIDE, PRESERVATIVE FREE 10 ML: 5 INJECTION INTRAVENOUS at 20:47

## 2024-01-26 RX ADMIN — Medication 1 LOZENGE: at 16:25

## 2024-01-26 RX ADMIN — PROPOFOL 25 MG: 10 INJECTION, EMULSION INTRAVENOUS at 14:20

## 2024-01-26 RX ADMIN — PROPOFOL 100 MG: 10 INJECTION, EMULSION INTRAVENOUS at 14:17

## 2024-01-26 RX ADMIN — GABAPENTIN 300 MG: 300 CAPSULE ORAL at 09:22

## 2024-01-26 RX ADMIN — Medication 1 LOZENGE: at 09:22

## 2024-01-26 RX ADMIN — MOMETASONE FUROATE AND FORMOTEROL FUMARATE DIHYDRATE 2 PUFF: 100; 5 AEROSOL RESPIRATORY (INHALATION) at 07:54

## 2024-01-26 RX ADMIN — Medication 1 LOZENGE: at 00:49

## 2024-01-26 RX ADMIN — Medication 100 MG: at 09:22

## 2024-01-26 RX ADMIN — LIDOCAINE HYDROCHLORIDE 150 MG: 20 INJECTION, SOLUTION EPIDURAL; INFILTRATION; INTRACAUDAL; PERINEURAL at 14:17

## 2024-01-26 RX ADMIN — LORAZEPAM 1 MG: 1 TABLET ORAL at 16:55

## 2024-01-26 RX ADMIN — BUPRENORPHINE AND NALOXONE 1 FILM: 8; 2 FILM, SOLUBLE BUCCAL; SUBLINGUAL at 20:39

## 2024-01-26 RX ADMIN — SODIUM CHLORIDE: 9 INJECTION, SOLUTION INTRAVENOUS at 14:10

## 2024-01-26 RX ADMIN — SODIUM CHLORIDE: 9 INJECTION, SOLUTION INTRAVENOUS at 05:48

## 2024-01-26 RX ADMIN — MOMETASONE FUROATE AND FORMOTEROL FUMARATE DIHYDRATE 2 PUFF: 100; 5 AEROSOL RESPIRATORY (INHALATION) at 20:58

## 2024-01-26 RX ADMIN — PANTOPRAZOLE SODIUM 40 MG: 40 TABLET, DELAYED RELEASE ORAL at 05:25

## 2024-01-26 RX ADMIN — ESCITALOPRAM OXALATE 20 MG: 10 TABLET ORAL at 09:22

## 2024-01-26 RX ADMIN — GABAPENTIN 300 MG: 300 CAPSULE ORAL at 20:38

## 2024-01-26 ASSESSMENT — PAIN SCALES - GENERAL
PAINLEVEL_OUTOF10: 0
PAINLEVEL_OUTOF10: 8

## 2024-01-26 ASSESSMENT — PAIN DESCRIPTION - LOCATION: LOCATION: MOUTH

## 2024-01-26 ASSESSMENT — PAIN - FUNCTIONAL ASSESSMENT
PAIN_FUNCTIONAL_ASSESSMENT: 0-10
PAIN_FUNCTIONAL_ASSESSMENT: FACE, LEGS, ACTIVITY, CRY, AND CONSOLABILITY (FLACC)

## 2024-01-26 ASSESSMENT — PAIN DESCRIPTION - ORIENTATION: ORIENTATION: OTHER (COMMENT)

## 2024-01-26 ASSESSMENT — PAIN DESCRIPTION - DESCRIPTORS
DESCRIPTORS: POUNDING;THROBBING
DESCRIPTORS: ACHING;CRAMPING

## 2024-01-26 NOTE — H&P
Romi Denis MD   levothyroxine (SYNTHROID) 50 MCG tablet Take 1 tablet by mouth daily  Patient not taking: Reported on 1/26/2024 10/26/23   Romi Denis MD   omeprazole (PRILOSEC) 40 MG delayed release capsule Take 1 capsule by mouth every morning (before breakfast) 10/26/23   Romi Denis MD   vitamin D (ERGOCALCIFEROL) 1.25 MG (92700 UT) CAPS capsule Take 1 capsule by mouth once a week 10/26/23   Romi Denis MD   albuterol sulfate HFA (VENTOLIN HFA) 108 (90 Base) MCG/ACT inhaler Inhale 2 puffs into the lungs 4 times daily as needed for Wheezing 10/26/23   Rmoi Denis MD   gabapentin (NEURONTIN) 300 MG capsule Take 1 capsule by mouth 3 times daily for 30 days. 10/26/23 11/25/23  Romi Denis MD   valACYclovir (VALTREX) 500 MG tablet Po daily  Patient taking differently: Take 1 tablet by mouth daily 10/26/23   Romi Denis MD   tiZANidine (ZANAFLEX) 4 MG tablet TAKE ONE TABLET BY MOUTH THREE TIMES A DAY 10/26/23   Romi Denis MD   mupirocin (BACTROBAN) 2 % ointment Apply topically 3 times daily. 7/11/23   Romi Denis MD   aspirin (ASPIRIN LOW DOSE) 81 MG chewable tablet CHEW ONE TABLET BY MOUTH TWICE A DAY UNTIL OTHERWISE INSTRUCTED BY YOUR ORTHOPEDIC DOCTOR 9/7/22   Romi Denis MD   acetaminophen (TYLENOL) 500 MG tablet Take 1 tablet by mouth 3 times daily as needed for Pain 9/7/22   Romi Denis MD   ipratropium-albuterol (DUONEB) 0.5-2.5 (3) MG/3ML SOLN nebulizer solution Inhale 3 mLs into the lungs every 4 hours 11/17/20   Romi Denis MD       Allergies:  Bactrim [sulfamethoxazole-trimethoprim] and Flagyl [metronidazole]    Social History:   TOBACCO:   reports that she has been smoking cigarettes. She has never used smokeless tobacco.  ETOH:   reports current alcohol use of about 0.8 standard drinks of alcohol per week.  DRUGS:   reports current drug use. Drugs: IV and Marijuana (Weed).    PHYSICAL EXAM:      Vital Signs: 
  ipratropium-albuterol (DUONEB) 0.5-2.5 (3) MG/3ML SOLN nebulizer solution Inhale 3 mLs into the lungs every 4 hours 11/17/20   Romi Denis MD       Allergies:  Bactrim [sulfamethoxazole-trimethoprim] and Flagyl [metronidazole]    Social History:    Social History     Socioeconomic History    Marital status:      Spouse name: Not on file    Number of children: Not on file    Years of education: Not on file    Highest education level: Not on file   Occupational History    Not on file   Tobacco Use    Smoking status: Every Day     Current packs/day: 0.50     Types: Cigarettes    Smokeless tobacco: Never   Vaping Use    Vaping Use: Never used   Substance and Sexual Activity    Alcohol use: Yes     Alcohol/week: 0.8 standard drinks of alcohol     Types: 1 Standard drinks or equivalent per week     Comment: social    Drug use: Yes     Types: IV, Marijuana (Weed)     Comment: heroin    Sexual activity: Yes     Partners: Male     Comment: pt states gram/day.   Other Topics Concern    Not on file   Social History Narrative    Not on file     Social Determinants of Health     Financial Resource Strain: Not on file   Food Insecurity: Not on file   Transportation Needs: Not on file   Physical Activity: Not on file   Stress: Not on file   Social Connections: Not on file   Intimate Partner Violence: Not on file   Housing Stability: Not on file       TOBACCO:   reports that she has been smoking cigarettes. She has never used smokeless tobacco.  ETOH:   reports current alcohol use of about 0.8 standard drinks of alcohol per week.    REVIEW OF SYSTEMS:    Vital: BP (!) 153/99   Pulse (!) 101   Temp 98.1 °F (36.7 °C) (Oral)   Resp 16   Ht 1.702 m (5' 7\")   Wt 64.2 kg (141 lb 8.6 oz)   LMP 01/17/2024 (Approximate)   SpO2 96%   BMI 22.17 kg/m²   Constitutional: no fever, no night sweats, fatigue  Head: no headache, no head injury, no migranes.  Eye: no blurring of vision, no double vision.  Ears: no hearing

## 2024-01-26 NOTE — ANESTHESIA PRE PROCEDURE
Department of Anesthesiology  Preprocedure Note       Name:  Lali Cazares   Age:  48 y.o.  :  1975                                          MRN:  [id number]         Date:  2024      Surgeon: Surgeon(s):  Raúl Tello MD    Procedure: Procedure(s):  ESOPHAGOGASTRODUODENOSCOPY    Medications prior to admission:   Prior to Admission medications    Medication Sig Start Date End Date Taking? Authorizing Provider   buprenorphine-naloxone (SUBOXONE) 8-2 MG SUBL SL tablet Place 1 tablet under the tongue in the morning and at bedtime. Max Daily Amount: 2 tablets 23  Yes Timmy Corley MD   buprenorphine-naloxone (SUBOXONE) 2-0.5 MG SUBL Place 1 tablet under the tongue daily. With first dose of 8-2 24  Yes Timmy Corley MD   hydrOXYzine HCl (ATARAX) 25 MG tablet TAKE ONE TABLET BY MOUTH EVERY 8 HOURS AS NEEDED FOR ITCHING 23   Romi Denis MD   ondansetron (ZOFRAN) 4 MG tablet TAKE 1 TABLET BY MOUTH 3 TIMES A DAY AS NEEDED FOR NAUSEA AND/OR VOMITING 23   Romi Denis MD   mometasone-formoterol (DULERA) 100-5 MCG/ACT inhaler INHALE TWO PUFFS BY MOUTH TWICE A DAY 10/26/23   Romi Denis MD   escitalopram (LEXAPRO) 20 MG tablet Take 1 tablet by mouth daily 10/26/23   Romi Denis MD   furosemide (LASIX) 20 MG tablet Take 1 tablet by mouth 2 times daily 10/26/23   Romi Denis MD   cariprazine hcl (VRAYLAR) 3 MG CAPS capsule Take 1 capsule by mouth daily  Patient not taking: Reported on 2024 10/26/23   Romi Denis MD   levothyroxine (SYNTHROID) 50 MCG tablet Take 1 tablet by mouth daily  Patient not taking: Reported on 2024 10/26/23   Romi Denis MD   omeprazole (PRILOSEC) 40 MG delayed release capsule Take 1 capsule by mouth every morning (before breakfast) 10/26/23   Romi Denis MD   vitamin D (ERGOCALCIFEROL) 1.25 MG (25374 UT) CAPS capsule Take 1 capsule by mouth once a week 10/26/23

## 2024-01-26 NOTE — ADT AUTH CERT
(2) (3) (4):                  (X) Appropriate antiemetic treatment (eg, in observation care) does not sufficiently reduce vomiting.          1/26/2024  9:39 AM              -- 1/26/2024  9:39 AM by Yohana Rodgers RN --                  Nausea/vomiting for 3-4 days                  given IV reglan

## 2024-01-26 NOTE — OP NOTE
Endoscopy Note    Patient: Lali Cazares  : 1975  Acct#:     Procedure: Esophagogastroduodenoscopy with biopsy                         Date:  2024     Surgeon:   Raúl Tello MD    Referring Physician:  Romi Denis MD    Indications: This is a 48 y.o. year old female who presents today with Nausea, Vomiting, Abdominal pain    Postoperative Diagnosis:  1. Trace Grade 1 Esophageal varices 2. PHG. 3. Clean based antral ulcer    Anesthesia:  The patient was administered IV propofol per anesthesiology team.  Please see their operative records for full details.      Consent:  The patient or their legal guardian has signed an informed consent, and is aware of the potential risks, benefits, alternatives, and potential complications of this procedure.  These include, but are not limited to hemorrhage, bleeding, post procedural pain, perforation, phlebitis, aspiration, hypotension, hypoxia, cardiovascular events such as arryhthmia, and possibly death.     Description of Procedure: The patient was then taken to the endoscopy suite, placed in the left lateral decubitus position and the above IV sedation was administrered.    The Olympus video endoscope was placed through the patient's oropharynx without difficulty to the extent of the 2nd portion of the duodenum.  Both forward and retroflexed views of the stomach were obtained.      Findings:    Esophagus: The esophagus had evidence of trace grade 1 non bleeding esophageal varices. The esophagus otherwise appeared normal without evidence of Carr's esophagus or reflux esophagitis.     Stomach: The stomach had evidence of PHG in the gastric body. A 5-6 mm clean based non bleeding antral ulcer was present. The stomach otherwise appeared normal on forward and retroflexed views. No gastric varices were present.     Duodenum: The first and 2nd portions of the duodenum appeared normal with normal villous pattern    Estimated Blood Loss

## 2024-01-26 NOTE — ANESTHESIA POSTPROCEDURE EVALUATION
Department of Anesthesiology  Postprocedure Note    Patient: Lali Cazares  MRN: [id number]  YOB: 1975  Date of evaluation: 1/26/2024    Procedure Summary       Date: 01/26/24 Room / Location: [address]    Anesthesia Start: 1410 Anesthesia Stop: 1431    Procedure: EGD BIOPSY Diagnosis:       Abdominal pain, unspecified abdominal location      (Abdominal pain, unspecified abdominal location [R10.9])    Surgeons: Raúl Tello MD Responsible Provider: Ashli Servin MD    Anesthesia Type: MAC ASA Status: 3            Anesthesia Type: No value filed.    Ravindra Phase I: Ravindra Score: 4    Ravindra Phase II:      Anesthesia Post Evaluation    Patient location during evaluation: bedside  Patient participation: complete - patient participated  Level of consciousness: awake and alert  Pain score: 3  Airway patency: patent  Nausea & Vomiting: no vomiting  Cardiovascular status: blood pressure returned to baseline  Respiratory status: acceptable  Hydration status: euvolemic  Pain management: adequate    No notable events documented.

## 2024-01-26 NOTE — CARE COORDINATION
SW attempted to meet with patient but she was away for a procedure.     SW visited bedside and there was a male in the room but he was asleep.     SW will try again later if time permits.     Respectfully submitted,    Shruti CARRASQUILLO, [facility]INOKe[facility] of [facility]   [phone]    Electronically signed by FOREIGN Landrum, JULIET on 1/26/2024 at 3:12 PM

## 2024-01-27 LAB
AFP-TM SERPL-MCNC: 3.9 UG/L
ALBUMIN SERPL-MCNC: 3.7 G/DL (ref 3.4–5)
ALBUMIN/GLOB SERPL: 1.6 {RATIO} (ref 1.1–2.2)
ALP SERPL-CCNC: 175 U/L (ref 40–129)
ALT SERPL-CCNC: 52 U/L (ref 10–40)
ANION GAP SERPL CALCULATED.3IONS-SCNC: 7 MMOL/L (ref 3–16)
AST SERPL-CCNC: 101 U/L (ref 15–37)
BILIRUB SERPL-MCNC: 1.6 MG/DL (ref 0–1)
BUN SERPL-MCNC: 8 MG/DL (ref 7–20)
CALCIUM SERPL-MCNC: 8.5 MG/DL (ref 8.3–10.6)
CHLORIDE SERPL-SCNC: 106 MMOL/L (ref 99–110)
CO2 SERPL-SCNC: 28 MMOL/L (ref 21–32)
CREAT SERPL-MCNC: 0.5 MG/DL (ref 0.6–1.1)
DEPRECATED RDW RBC AUTO: 19.4 % (ref 12.4–15.4)
GFR SERPLBLD CREATININE-BSD FMLA CKD-EPI: >60 ML/MIN/{1.73_M2}
GLUCOSE SERPL-MCNC: 89 MG/DL (ref 70–99)
HAV AB SER QL IA: POSITIVE
HCT VFR BLD AUTO: 31.6 % (ref 36–48)
HGB BLD-MCNC: 10.1 G/DL (ref 12–16)
MCH RBC QN AUTO: 25.8 PG (ref 26–34)
MCHC RBC AUTO-ENTMCNC: 32 G/DL (ref 31–36)
MCV RBC AUTO: 80.8 FL (ref 80–100)
PLATELET # BLD AUTO: 35 K/UL (ref 135–450)
PMV BLD AUTO: 8.1 FL (ref 5–10.5)
POTASSIUM SERPL-SCNC: 3.3 MMOL/L (ref 3.5–5.1)
PROT SERPL-MCNC: 6 G/DL (ref 6.4–8.2)
RBC # BLD AUTO: 3.91 M/UL (ref 4–5.2)
SODIUM SERPL-SCNC: 141 MMOL/L (ref 136–145)
WBC # BLD AUTO: 6.1 K/UL (ref 4–11)

## 2024-01-27 PROCEDURE — 94640 AIRWAY INHALATION TREATMENT: CPT

## 2024-01-27 PROCEDURE — 94760 N-INVAS EAR/PLS OXIMETRY 1: CPT

## 2024-01-27 PROCEDURE — 6370000000 HC RX 637 (ALT 250 FOR IP): Performed by: SPECIALIST

## 2024-01-27 PROCEDURE — 80053 COMPREHEN METABOLIC PANEL: CPT

## 2024-01-27 PROCEDURE — 1200000000 HC SEMI PRIVATE

## 2024-01-27 PROCEDURE — 6370000000 HC RX 637 (ALT 250 FOR IP): Performed by: INTERNAL MEDICINE

## 2024-01-27 PROCEDURE — 2580000003 HC RX 258: Performed by: ANESTHESIOLOGY

## 2024-01-27 PROCEDURE — 6360000002 HC RX W HCPCS: Performed by: INTERNAL MEDICINE

## 2024-01-27 PROCEDURE — 36415 COLL VENOUS BLD VENIPUNCTURE: CPT

## 2024-01-27 PROCEDURE — 85027 COMPLETE CBC AUTOMATED: CPT

## 2024-01-27 RX ORDER — POTASSIUM CHLORIDE 20 MEQ/1
40 TABLET, EXTENDED RELEASE ORAL PRN
Status: DISCONTINUED | OUTPATIENT
Start: 2024-01-27 | End: 2024-01-31 | Stop reason: HOSPADM

## 2024-01-27 RX ORDER — POTASSIUM CHLORIDE 7.45 MG/ML
10 INJECTION INTRAVENOUS PRN
Status: DISCONTINUED | OUTPATIENT
Start: 2024-01-27 | End: 2024-01-31 | Stop reason: HOSPADM

## 2024-01-27 RX ADMIN — Medication 1 LOZENGE: at 14:25

## 2024-01-27 RX ADMIN — BUPRENORPHINE AND NALOXONE 1 FILM: 8; 2 FILM, SOLUBLE BUCCAL; SUBLINGUAL at 08:35

## 2024-01-27 RX ADMIN — SODIUM CHLORIDE, PRESERVATIVE FREE 10 ML: 5 INJECTION INTRAVENOUS at 10:30

## 2024-01-27 RX ADMIN — LORAZEPAM 2 MG: 2 INJECTION INTRAMUSCULAR; INTRAVENOUS at 08:41

## 2024-01-27 RX ADMIN — FUROSEMIDE 20 MG: 20 TABLET ORAL at 17:15

## 2024-01-27 RX ADMIN — BUPRENORPHINE AND NALOXONE 1 FILM: 8; 2 FILM, SOLUBLE BUCCAL; SUBLINGUAL at 21:00

## 2024-01-27 RX ADMIN — POTASSIUM BICARBONATE 40 MEQ: 782 TABLET, EFFERVESCENT ORAL at 11:26

## 2024-01-27 RX ADMIN — BUPRENORPHINE HYDROCHLORIDE AND NALOXONE HYDROCHLORIDE DIHYDRATE 1 TABLET: 2; .5 TABLET SUBLINGUAL at 08:34

## 2024-01-27 RX ADMIN — MOMETASONE FUROATE AND FORMOTEROL FUMARATE DIHYDRATE 2 PUFF: 100; 5 AEROSOL RESPIRATORY (INHALATION) at 08:09

## 2024-01-27 RX ADMIN — LORAZEPAM 1 MG: 1 TABLET ORAL at 21:00

## 2024-01-27 RX ADMIN — FUROSEMIDE 20 MG: 20 TABLET ORAL at 08:34

## 2024-01-27 RX ADMIN — Medication 100 MG: at 08:35

## 2024-01-27 RX ADMIN — Medication 1 LOZENGE: at 08:34

## 2024-01-27 RX ADMIN — GABAPENTIN 300 MG: 300 CAPSULE ORAL at 08:34

## 2024-01-27 RX ADMIN — LEVOTHYROXINE SODIUM 50 MCG: 0.03 TABLET ORAL at 05:17

## 2024-01-27 RX ADMIN — ESCITALOPRAM OXALATE 20 MG: 10 TABLET ORAL at 08:34

## 2024-01-27 RX ADMIN — LORAZEPAM 2 MG: 2 INJECTION INTRAMUSCULAR; INTRAVENOUS at 17:18

## 2024-01-27 RX ADMIN — PANTOPRAZOLE SODIUM 40 MG: 40 TABLET, DELAYED RELEASE ORAL at 05:17

## 2024-01-27 RX ADMIN — MOMETASONE FUROATE AND FORMOTEROL FUMARATE DIHYDRATE 2 PUFF: 100; 5 AEROSOL RESPIRATORY (INHALATION) at 19:42

## 2024-01-27 RX ADMIN — HYDROXYZINE HYDROCHLORIDE 25 MG: 25 TABLET, FILM COATED ORAL at 11:26

## 2024-01-27 RX ADMIN — GABAPENTIN 300 MG: 300 CAPSULE ORAL at 21:00

## 2024-01-27 RX ADMIN — GABAPENTIN 300 MG: 300 CAPSULE ORAL at 14:24

## 2024-01-27 RX ADMIN — Medication 1 LOZENGE: at 21:01

## 2024-01-27 RX ADMIN — SODIUM CHLORIDE, PRESERVATIVE FREE 10 ML: 5 INJECTION INTRAVENOUS at 21:03

## 2024-01-27 ASSESSMENT — PAIN DESCRIPTION - LOCATION: LOCATION: GENERALIZED

## 2024-01-27 ASSESSMENT — PAIN DESCRIPTION - DESCRIPTORS: DESCRIPTORS: DISCOMFORT

## 2024-01-27 ASSESSMENT — PAIN SCALES - GENERAL
PAINLEVEL_OUTOF10: 2
PAINLEVEL_OUTOF10: 0
PAINLEVEL_OUTOF10: 8

## 2024-01-27 ASSESSMENT — PAIN DESCRIPTION - FREQUENCY: FREQUENCY: INTERMITTENT

## 2024-01-27 ASSESSMENT — PAIN DESCRIPTION - PAIN TYPE: TYPE: CHRONIC PAIN

## 2024-01-28 LAB
ALBUMIN SERPL-MCNC: 3.7 G/DL (ref 3.4–5)
ALBUMIN/GLOB SERPL: 1.7 {RATIO} (ref 1.1–2.2)
ALP SERPL-CCNC: 170 U/L (ref 40–129)
ALT SERPL-CCNC: 57 U/L (ref 10–40)
AMPHETAMINES UR QL SCN>1000 NG/ML: NORMAL
ANION GAP SERPL CALCULATED.3IONS-SCNC: 8 MMOL/L (ref 3–16)
AST SERPL-CCNC: 110 U/L (ref 15–37)
BACTERIA URNS QL MICRO: ABNORMAL /HPF
BARBITURATES UR QL SCN>200 NG/ML: NORMAL
BENZODIAZ UR QL SCN>200 NG/ML: NORMAL
BILIRUB SERPL-MCNC: 1.6 MG/DL (ref 0–1)
BILIRUB UR QL STRIP.AUTO: ABNORMAL
BUN SERPL-MCNC: 8 MG/DL (ref 7–20)
CALCIUM SERPL-MCNC: 8.6 MG/DL (ref 8.3–10.6)
CANNABINOIDS UR QL SCN>50 NG/ML: NORMAL
CHLORIDE SERPL-SCNC: 102 MMOL/L (ref 99–110)
CLARITY UR: CLEAR
CO2 SERPL-SCNC: 31 MMOL/L (ref 21–32)
COCAINE UR QL SCN: NORMAL
COLOR UR: ABNORMAL
CREAT SERPL-MCNC: 0.6 MG/DL (ref 0.6–1.1)
DEPRECATED RDW RBC AUTO: 19.6 % (ref 12.4–15.4)
DRUG SCREEN COMMENT UR-IMP: NORMAL
EPI CELLS #/AREA URNS AUTO: 7 /HPF (ref 0–5)
FENTANYL SCREEN, URINE: NORMAL
GFR SERPLBLD CREATININE-BSD FMLA CKD-EPI: >60 ML/MIN/{1.73_M2}
GLUCOSE SERPL-MCNC: 99 MG/DL (ref 70–99)
GLUCOSE UR STRIP.AUTO-MCNC: NEGATIVE MG/DL
HCT VFR BLD AUTO: 32.4 % (ref 36–48)
HGB BLD-MCNC: 10.4 G/DL (ref 12–16)
HGB UR QL STRIP.AUTO: NEGATIVE
HYALINE CASTS #/AREA URNS AUTO: 1 /LPF (ref 0–8)
KETONES UR STRIP.AUTO-MCNC: NEGATIVE MG/DL
LEUKOCYTE ESTERASE UR QL STRIP.AUTO: ABNORMAL
MCH RBC QN AUTO: 25.9 PG (ref 26–34)
MCHC RBC AUTO-ENTMCNC: 32.1 G/DL (ref 31–36)
MCV RBC AUTO: 80.6 FL (ref 80–100)
METHADONE UR QL SCN>300 NG/ML: NORMAL
NITRITE UR QL STRIP.AUTO: NEGATIVE
OPIATES UR QL SCN>300 NG/ML: NORMAL
OXYCODONE UR QL SCN: NORMAL
PCP UR QL SCN>25 NG/ML: NORMAL
PH UR STRIP.AUTO: 7 [PH] (ref 5–8)
PH UR STRIP: 7 [PH]
PLATELET # BLD AUTO: 37 K/UL (ref 135–450)
PMV BLD AUTO: 8.1 FL (ref 5–10.5)
POTASSIUM SERPL-SCNC: 3.3 MMOL/L (ref 3.5–5.1)
PROT SERPL-MCNC: 5.9 G/DL (ref 6.4–8.2)
PROT UR STRIP.AUTO-MCNC: NEGATIVE MG/DL
RBC # BLD AUTO: 4.03 M/UL (ref 4–5.2)
RBC CLUMPS #/AREA URNS AUTO: 1 /HPF (ref 0–4)
SODIUM SERPL-SCNC: 141 MMOL/L (ref 136–145)
SP GR UR STRIP.AUTO: 1.02 (ref 1–1.03)
UA COMPLETE W REFLEX CULTURE PNL UR: ABNORMAL
UA DIPSTICK W REFLEX MICRO PNL UR: YES
URN SPEC COLLECT METH UR: ABNORMAL
UROBILINOGEN UR STRIP-ACNC: >=8 E.U./DL
WBC # BLD AUTO: 5 K/UL (ref 4–11)
WBC #/AREA URNS AUTO: 2 /HPF (ref 0–5)

## 2024-01-28 PROCEDURE — 80053 COMPREHEN METABOLIC PANEL: CPT

## 2024-01-28 PROCEDURE — 1200000000 HC SEMI PRIVATE

## 2024-01-28 PROCEDURE — 81001 URINALYSIS AUTO W/SCOPE: CPT

## 2024-01-28 PROCEDURE — 6370000000 HC RX 637 (ALT 250 FOR IP): Performed by: SPECIALIST

## 2024-01-28 PROCEDURE — 36415 COLL VENOUS BLD VENIPUNCTURE: CPT

## 2024-01-28 PROCEDURE — 85027 COMPLETE CBC AUTOMATED: CPT

## 2024-01-28 PROCEDURE — 6360000002 HC RX W HCPCS: Performed by: INTERNAL MEDICINE

## 2024-01-28 PROCEDURE — 80307 DRUG TEST PRSMV CHEM ANLYZR: CPT

## 2024-01-28 PROCEDURE — 94760 N-INVAS EAR/PLS OXIMETRY 1: CPT

## 2024-01-28 PROCEDURE — 6370000000 HC RX 637 (ALT 250 FOR IP): Performed by: INTERNAL MEDICINE

## 2024-01-28 PROCEDURE — 2580000003 HC RX 258: Performed by: ANESTHESIOLOGY

## 2024-01-28 PROCEDURE — 2580000003 HC RX 258: Performed by: INTERNAL MEDICINE

## 2024-01-28 PROCEDURE — 94640 AIRWAY INHALATION TREATMENT: CPT

## 2024-01-28 RX ADMIN — PANTOPRAZOLE SODIUM 40 MG: 40 TABLET, DELAYED RELEASE ORAL at 10:17

## 2024-01-28 RX ADMIN — MOMETASONE FUROATE AND FORMOTEROL FUMARATE DIHYDRATE 2 PUFF: 100; 5 AEROSOL RESPIRATORY (INHALATION) at 08:56

## 2024-01-28 RX ADMIN — FUROSEMIDE 20 MG: 20 TABLET ORAL at 10:18

## 2024-01-28 RX ADMIN — Medication 100 MG: at 10:17

## 2024-01-28 RX ADMIN — ESCITALOPRAM OXALATE 20 MG: 10 TABLET ORAL at 10:17

## 2024-01-28 RX ADMIN — FUROSEMIDE 20 MG: 20 TABLET ORAL at 18:47

## 2024-01-28 RX ADMIN — GABAPENTIN 300 MG: 300 CAPSULE ORAL at 10:18

## 2024-01-28 RX ADMIN — BUPRENORPHINE AND NALOXONE 1 FILM: 8; 2 FILM, SOLUBLE BUCCAL; SUBLINGUAL at 10:20

## 2024-01-28 RX ADMIN — LORAZEPAM 1 MG: 1 TABLET ORAL at 10:17

## 2024-01-28 RX ADMIN — SODIUM CHLORIDE, PRESERVATIVE FREE 10 ML: 5 INJECTION INTRAVENOUS at 21:57

## 2024-01-28 RX ADMIN — Medication 1 LOZENGE: at 15:38

## 2024-01-28 RX ADMIN — GABAPENTIN 300 MG: 300 CAPSULE ORAL at 21:56

## 2024-01-28 RX ADMIN — MOMETASONE FUROATE AND FORMOTEROL FUMARATE DIHYDRATE 2 PUFF: 100; 5 AEROSOL RESPIRATORY (INHALATION) at 20:03

## 2024-01-28 RX ADMIN — GABAPENTIN 300 MG: 300 CAPSULE ORAL at 15:37

## 2024-01-28 RX ADMIN — BUPRENORPHINE AND NALOXONE 1 FILM: 8; 2 FILM, SOLUBLE BUCCAL; SUBLINGUAL at 21:56

## 2024-01-28 RX ADMIN — Medication 1 LOZENGE: at 10:21

## 2024-01-28 RX ADMIN — SODIUM CHLORIDE, PRESERVATIVE FREE 10 ML: 5 INJECTION INTRAVENOUS at 10:25

## 2024-01-28 RX ADMIN — LEVOTHYROXINE SODIUM 50 MCG: 0.03 TABLET ORAL at 10:17

## 2024-01-28 RX ADMIN — SODIUM CHLORIDE, PRESERVATIVE FREE 10 ML: 5 INJECTION INTRAVENOUS at 21:58

## 2024-01-28 RX ADMIN — POTASSIUM CHLORIDE 40 MEQ: 1500 TABLET, EXTENDED RELEASE ORAL at 18:55

## 2024-01-28 RX ADMIN — BUPRENORPHINE HYDROCHLORIDE AND NALOXONE HYDROCHLORIDE DIHYDRATE 1 TABLET: 2; .5 TABLET SUBLINGUAL at 10:18

## 2024-01-28 RX ADMIN — LORAZEPAM 1 MG: 1 TABLET ORAL at 22:09

## 2024-01-28 RX ADMIN — Medication 1 LOZENGE: at 21:58

## 2024-01-28 RX ADMIN — HYDROXYZINE HYDROCHLORIDE 25 MG: 25 TABLET, FILM COATED ORAL at 21:56

## 2024-01-28 ASSESSMENT — PAIN DESCRIPTION - LOCATION
LOCATION: THROAT

## 2024-01-28 ASSESSMENT — PAIN DESCRIPTION - DESCRIPTORS: DESCRIPTORS: SORE

## 2024-01-28 ASSESSMENT — PAIN SCALES - GENERAL
PAINLEVEL_OUTOF10: 4
PAINLEVEL_OUTOF10: 3
PAINLEVEL_OUTOF10: 9
PAINLEVEL_OUTOF10: 7

## 2024-01-28 ASSESSMENT — PAIN - FUNCTIONAL ASSESSMENT: PAIN_FUNCTIONAL_ASSESSMENT: ACTIVITIES ARE NOT PREVENTED

## 2024-01-29 LAB
ALBUMIN SERPL-MCNC: 3.6 G/DL (ref 3.4–5)
ALBUMIN/GLOB SERPL: 1.5 {RATIO} (ref 1.1–2.2)
ALP SERPL-CCNC: 156 U/L (ref 40–129)
ALT SERPL-CCNC: 58 U/L (ref 10–40)
AMMONIA PLAS-SCNC: 33 UMOL/L (ref 11–51)
AMPHETAMINES UR QL SCN>1000 NG/ML: NORMAL
ANION GAP SERPL CALCULATED.3IONS-SCNC: 6 MMOL/L (ref 3–16)
AST SERPL-CCNC: 111 U/L (ref 15–37)
BARBITURATES UR QL SCN>200 NG/ML: NORMAL
BENZODIAZ UR QL SCN>200 NG/ML: NORMAL
BILIRUB SERPL-MCNC: 1.4 MG/DL (ref 0–1)
BUN SERPL-MCNC: 7 MG/DL (ref 7–20)
CALCIUM SERPL-MCNC: 8.2 MG/DL (ref 8.3–10.6)
CANNABINOIDS UR QL SCN>50 NG/ML: NORMAL
CHLORIDE SERPL-SCNC: 102 MMOL/L (ref 99–110)
CO2 SERPL-SCNC: 34 MMOL/L (ref 21–32)
COCAINE UR QL SCN: NORMAL
CREAT SERPL-MCNC: <0.5 MG/DL (ref 0.6–1.1)
DEPRECATED RDW RBC AUTO: 19.8 % (ref 12.4–15.4)
DRUG SCREEN COMMENT UR-IMP: NORMAL
FENTANYL SCREEN, URINE: NORMAL
GFR SERPLBLD CREATININE-BSD FMLA CKD-EPI: >60 ML/MIN/{1.73_M2}
GLUCOSE SERPL-MCNC: 96 MG/DL (ref 70–99)
HCT VFR BLD AUTO: 32.6 % (ref 36–48)
HGB BLD-MCNC: 10.3 G/DL (ref 12–16)
MCH RBC QN AUTO: 25.7 PG (ref 26–34)
MCHC RBC AUTO-ENTMCNC: 31.7 G/DL (ref 31–36)
MCV RBC AUTO: 81.3 FL (ref 80–100)
METHADONE UR QL SCN>300 NG/ML: NORMAL
OPIATES UR QL SCN>300 NG/ML: NORMAL
OXYCODONE UR QL SCN: NORMAL
PCP UR QL SCN>25 NG/ML: NORMAL
PH UR STRIP: 7 [PH]
PLATELET # BLD AUTO: 42 K/UL (ref 135–450)
PMV BLD AUTO: 9.7 FL (ref 5–10.5)
POTASSIUM SERPL-SCNC: 3.1 MMOL/L (ref 3.5–5.1)
PROT SERPL-MCNC: 6 G/DL (ref 6.4–8.2)
RBC # BLD AUTO: 4.01 M/UL (ref 4–5.2)
SODIUM SERPL-SCNC: 142 MMOL/L (ref 136–145)
WBC # BLD AUTO: 4.5 K/UL (ref 4–11)

## 2024-01-29 PROCEDURE — 6370000000 HC RX 637 (ALT 250 FOR IP): Performed by: INTERNAL MEDICINE

## 2024-01-29 PROCEDURE — 1200000000 HC SEMI PRIVATE

## 2024-01-29 PROCEDURE — 80307 DRUG TEST PRSMV CHEM ANLYZR: CPT

## 2024-01-29 PROCEDURE — 94640 AIRWAY INHALATION TREATMENT: CPT

## 2024-01-29 PROCEDURE — 2580000003 HC RX 258: Performed by: ANESTHESIOLOGY

## 2024-01-29 PROCEDURE — 94760 N-INVAS EAR/PLS OXIMETRY 1: CPT

## 2024-01-29 PROCEDURE — 36415 COLL VENOUS BLD VENIPUNCTURE: CPT

## 2024-01-29 PROCEDURE — 80053 COMPREHEN METABOLIC PANEL: CPT

## 2024-01-29 PROCEDURE — 85027 COMPLETE CBC AUTOMATED: CPT

## 2024-01-29 PROCEDURE — 82140 ASSAY OF AMMONIA: CPT

## 2024-01-29 PROCEDURE — 99232 SBSQ HOSP IP/OBS MODERATE 35: CPT | Performed by: INTERNAL MEDICINE

## 2024-01-29 PROCEDURE — 6360000002 HC RX W HCPCS: Performed by: INTERNAL MEDICINE

## 2024-01-29 RX ADMIN — GABAPENTIN 300 MG: 300 CAPSULE ORAL at 20:51

## 2024-01-29 RX ADMIN — Medication 1 LOZENGE: at 09:31

## 2024-01-29 RX ADMIN — LORAZEPAM 2 MG: 1 TABLET ORAL at 20:50

## 2024-01-29 RX ADMIN — SODIUM CHLORIDE, PRESERVATIVE FREE 10 ML: 5 INJECTION INTRAVENOUS at 09:31

## 2024-01-29 RX ADMIN — ESCITALOPRAM OXALATE 20 MG: 10 TABLET ORAL at 09:30

## 2024-01-29 RX ADMIN — LEVOTHYROXINE SODIUM 50 MCG: 0.03 TABLET ORAL at 06:23

## 2024-01-29 RX ADMIN — MOMETASONE FUROATE AND FORMOTEROL FUMARATE DIHYDRATE 2 PUFF: 100; 5 AEROSOL RESPIRATORY (INHALATION) at 20:25

## 2024-01-29 RX ADMIN — GABAPENTIN 300 MG: 300 CAPSULE ORAL at 14:23

## 2024-01-29 RX ADMIN — PANTOPRAZOLE SODIUM 40 MG: 40 TABLET, DELAYED RELEASE ORAL at 06:23

## 2024-01-29 RX ADMIN — BUPRENORPHINE HYDROCHLORIDE AND NALOXONE HYDROCHLORIDE DIHYDRATE 1 TABLET: 2; .5 TABLET SUBLINGUAL at 09:30

## 2024-01-29 RX ADMIN — BUPRENORPHINE AND NALOXONE 1 FILM: 8; 2 FILM, SOLUBLE BUCCAL; SUBLINGUAL at 09:31

## 2024-01-29 RX ADMIN — FUROSEMIDE 20 MG: 20 TABLET ORAL at 09:31

## 2024-01-29 RX ADMIN — BUPRENORPHINE AND NALOXONE 1 FILM: 8; 2 FILM, SOLUBLE BUCCAL; SUBLINGUAL at 20:51

## 2024-01-29 RX ADMIN — Medication 100 MG: at 09:31

## 2024-01-29 RX ADMIN — GABAPENTIN 300 MG: 300 CAPSULE ORAL at 09:31

## 2024-01-29 RX ADMIN — FUROSEMIDE 20 MG: 20 TABLET ORAL at 18:13

## 2024-01-29 RX ADMIN — SODIUM CHLORIDE, PRESERVATIVE FREE 10 ML: 5 INJECTION INTRAVENOUS at 20:51

## 2024-01-29 RX ADMIN — LORAZEPAM 4 MG: 1 TABLET ORAL at 12:07

## 2024-01-29 NOTE — CARE COORDINATION
Case Management Assessment  Initial Evaluation    Date/Time of Evaluation: 1/29/2024 2:34 PM  Assessment Completed by: JUVENTINO Grider    If patient is discharged prior to next notation, then this note serves as note for discharge by case management.    Patient Name: Lali Cazares                   YOB: 1975  Diagnosis: Abdominal pain [R10.9]  Generalized abdominal pain [R10.84]  Abnormal CT of the abdomen [R93.5]  Elevated LFTs [R79.89]  QT prolongation [R94.31]  Alcohol withdrawal syndrome with complication (HCC) [F10.939]  Nausea and vomiting, unspecified vomiting type [R11.2]                   Date / Time: 1/24/2024  6:02 PM    Patient Admission Status: Inpatient   Readmission Risk (Low < 19, Mod (19-27), High > 27): Readmission Risk Score: 11.2    Current PCP: Romi Denis MD  PCP verified by CM? Yes    Chart Reviewed: Yes      History Provided by: Child/Family, Medical Record (Mother present in room)  Patient Orientation: Other (see comment) (patient unable to keep eyes open during discussion; mumbles answers)    Patient Cognition: Other (see comment) (patient unable to keep eyes open during discussion; mumbles answers)    Hospitalization in the last 30 days (Readmission):  No    If yes, Readmission Assessment in CM Navigator will be completed.    Advance Directives:      Code Status: Full Code   Patient's Primary Decision Maker is: Legal Next of Kin    Primary Decision Maker: Mahi Burch - Parent - [phone]    Discharge Planning:    Patient lives with: Spouse/Significant Other (adult son; boyrfriend's dad) Type of Home: House  Primary Care Giver: Self  Patient Support Systems include: Family Members, Spouse/Significant Other, Children   Current Financial resources: Medicaid  Current community resources: None  Current services prior to admission: None            Current DME:              Type of Home Care services:  None    ADLS  Prior functional level: Independent in

## 2024-01-30 ENCOUNTER — APPOINTMENT (OUTPATIENT)
Dept: MRI IMAGING | Age: 49
End: 2024-01-30
Payer: COMMERCIAL

## 2024-01-30 LAB
ALBUMIN SERPL-MCNC: 3.6 G/DL (ref 3.4–5)
ALBUMIN/GLOB SERPL: 1.3 {RATIO} (ref 1.1–2.2)
ALP SERPL-CCNC: 156 U/L (ref 40–129)
ALT SERPL-CCNC: 62 U/L (ref 10–40)
ANION GAP SERPL CALCULATED.3IONS-SCNC: 8 MMOL/L (ref 3–16)
AST SERPL-CCNC: 127 U/L (ref 15–37)
BILIRUB SERPL-MCNC: 1.5 MG/DL (ref 0–1)
BUN SERPL-MCNC: 9 MG/DL (ref 7–20)
CALCIUM SERPL-MCNC: 8.4 MG/DL (ref 8.3–10.6)
CHLORIDE SERPL-SCNC: 99 MMOL/L (ref 99–110)
CO2 SERPL-SCNC: 32 MMOL/L (ref 21–32)
CREAT SERPL-MCNC: 0.5 MG/DL (ref 0.6–1.1)
DEPRECATED RDW RBC AUTO: 21 % (ref 12.4–15.4)
GFR SERPLBLD CREATININE-BSD FMLA CKD-EPI: >60 ML/MIN/{1.73_M2}
GLUCOSE SERPL-MCNC: 88 MG/DL (ref 70–99)
HCT VFR BLD AUTO: 32.6 % (ref 36–48)
HGB BLD-MCNC: 10.4 G/DL (ref 12–16)
MCH RBC QN AUTO: 26 PG (ref 26–34)
MCHC RBC AUTO-ENTMCNC: 32 G/DL (ref 31–36)
MCV RBC AUTO: 81.2 FL (ref 80–100)
PLATELET # BLD AUTO: 47 K/UL (ref 135–450)
PMV BLD AUTO: 9.3 FL (ref 5–10.5)
POTASSIUM SERPL-SCNC: 3.1 MMOL/L (ref 3.5–5.1)
PROT SERPL-MCNC: 6.3 G/DL (ref 6.4–8.2)
RBC # BLD AUTO: 4.02 M/UL (ref 4–5.2)
SODIUM SERPL-SCNC: 139 MMOL/L (ref 136–145)
WBC # BLD AUTO: 4.7 K/UL (ref 4–11)

## 2024-01-30 PROCEDURE — 94640 AIRWAY INHALATION TREATMENT: CPT

## 2024-01-30 PROCEDURE — 80053 COMPREHEN METABOLIC PANEL: CPT

## 2024-01-30 PROCEDURE — 2580000003 HC RX 258: Performed by: INTERNAL MEDICINE

## 2024-01-30 PROCEDURE — 94760 N-INVAS EAR/PLS OXIMETRY 1: CPT

## 2024-01-30 PROCEDURE — 6370000000 HC RX 637 (ALT 250 FOR IP): Performed by: SPECIALIST

## 2024-01-30 PROCEDURE — 36415 COLL VENOUS BLD VENIPUNCTURE: CPT

## 2024-01-30 PROCEDURE — 85027 COMPLETE CBC AUTOMATED: CPT

## 2024-01-30 PROCEDURE — 51798 US URINE CAPACITY MEASURE: CPT

## 2024-01-30 PROCEDURE — 9990000010 HC NO CHARGE VISIT

## 2024-01-30 PROCEDURE — 6370000000 HC RX 637 (ALT 250 FOR IP): Performed by: INTERNAL MEDICINE

## 2024-01-30 PROCEDURE — 70551 MRI BRAIN STEM W/O DYE: CPT

## 2024-01-30 PROCEDURE — 51701 INSERT BLADDER CATHETER: CPT

## 2024-01-30 PROCEDURE — 1200000000 HC SEMI PRIVATE

## 2024-01-30 RX ADMIN — POTASSIUM CHLORIDE 40 MEQ: 1500 TABLET, EXTENDED RELEASE ORAL at 05:57

## 2024-01-30 RX ADMIN — MOMETASONE FUROATE AND FORMOTEROL FUMARATE DIHYDRATE 2 PUFF: 100; 5 AEROSOL RESPIRATORY (INHALATION) at 09:05

## 2024-01-30 RX ADMIN — SODIUM CHLORIDE, PRESERVATIVE FREE 10 ML: 5 INJECTION INTRAVENOUS at 22:00

## 2024-01-30 RX ADMIN — LORAZEPAM 2 MG: 1 TABLET ORAL at 01:28

## 2024-01-30 RX ADMIN — GABAPENTIN 300 MG: 300 CAPSULE ORAL at 21:55

## 2024-01-30 RX ADMIN — GABAPENTIN 300 MG: 300 CAPSULE ORAL at 09:54

## 2024-01-30 RX ADMIN — Medication 100 MG: at 09:54

## 2024-01-30 RX ADMIN — BUPRENORPHINE AND NALOXONE 1 FILM: 8; 2 FILM, SOLUBLE BUCCAL; SUBLINGUAL at 21:55

## 2024-01-30 RX ADMIN — LEVOTHYROXINE SODIUM 50 MCG: 0.03 TABLET ORAL at 05:57

## 2024-01-30 RX ADMIN — PANTOPRAZOLE SODIUM 40 MG: 40 TABLET, DELAYED RELEASE ORAL at 05:57

## 2024-01-30 RX ADMIN — FUROSEMIDE 20 MG: 20 TABLET ORAL at 09:54

## 2024-01-30 RX ADMIN — ESCITALOPRAM OXALATE 20 MG: 10 TABLET ORAL at 09:54

## 2024-01-30 RX ADMIN — MOMETASONE FUROATE AND FORMOTEROL FUMARATE DIHYDRATE 2 PUFF: 100; 5 AEROSOL RESPIRATORY (INHALATION) at 19:42

## 2024-01-30 NOTE — CARE COORDINATION
Consult received for [facility]. Spoke with bedside RN who states that patient is actively withdrawing but is also very lethargic and has been unable to urinate. New urology consult, MRI of brain, and PT/OT ordered. Per chart review patient was active with [facility]. Will continue to follow and assist with discharge planning.   Electronically signed by JULIET Lester on 1/30/2024 at 4:25 PM  [phone]

## 2024-01-31 VITALS
BODY MASS INDEX: 20.35 KG/M2 | TEMPERATURE: 98.8 F | WEIGHT: 129.63 LBS | HEART RATE: 94 BPM | SYSTOLIC BLOOD PRESSURE: 134 MMHG | OXYGEN SATURATION: 92 % | RESPIRATION RATE: 16 BRPM | HEIGHT: 67 IN | DIASTOLIC BLOOD PRESSURE: 88 MMHG

## 2024-01-31 LAB
BACTERIA URNS QL MICRO: ABNORMAL /HPF
BILIRUB UR QL STRIP.AUTO: ABNORMAL
CLARITY UR: ABNORMAL
COLOR UR: ABNORMAL
EPI CELLS #/AREA URNS AUTO: 4 /HPF (ref 0–5)
GLUCOSE UR STRIP.AUTO-MCNC: NEGATIVE MG/DL
HCG UR QL: NEGATIVE
HGB UR QL STRIP.AUTO: ABNORMAL
HYALINE CASTS #/AREA URNS AUTO: 2 /LPF (ref 0–8)
KETONES UR STRIP.AUTO-MCNC: ABNORMAL MG/DL
LEUKOCYTE ESTERASE UR QL STRIP.AUTO: ABNORMAL
NITRITE UR QL STRIP.AUTO: POSITIVE
PH UR STRIP.AUTO: 6 [PH] (ref 5–8)
PROT UR STRIP.AUTO-MCNC: 30 MG/DL
RBC CLUMPS #/AREA URNS AUTO: 6 /HPF (ref 0–4)
SP GR UR STRIP.AUTO: 1.03 (ref 1–1.03)
UA DIPSTICK W REFLEX MICRO PNL UR: YES
URN SPEC COLLECT METH UR: ABNORMAL
UROBILINOGEN UR STRIP-ACNC: 4 E.U./DL
WBC #/AREA URNS AUTO: 454 /HPF (ref 0–5)

## 2024-01-31 PROCEDURE — 6370000000 HC RX 637 (ALT 250 FOR IP): Performed by: INTERNAL MEDICINE

## 2024-01-31 PROCEDURE — 81001 URINALYSIS AUTO W/SCOPE: CPT

## 2024-01-31 PROCEDURE — 6370000000 HC RX 637 (ALT 250 FOR IP)

## 2024-01-31 PROCEDURE — 94760 N-INVAS EAR/PLS OXIMETRY 1: CPT

## 2024-01-31 PROCEDURE — 6360000002 HC RX W HCPCS: Performed by: INTERNAL MEDICINE

## 2024-01-31 PROCEDURE — 84703 CHORIONIC GONADOTROPIN ASSAY: CPT

## 2024-01-31 PROCEDURE — 51798 US URINE CAPACITY MEASURE: CPT

## 2024-01-31 PROCEDURE — 87077 CULTURE AEROBIC IDENTIFY: CPT

## 2024-01-31 PROCEDURE — 2580000003 HC RX 258

## 2024-01-31 PROCEDURE — 97116 GAIT TRAINING THERAPY: CPT

## 2024-01-31 PROCEDURE — 87186 SC STD MICRODIL/AGAR DIL: CPT

## 2024-01-31 PROCEDURE — 6360000002 HC RX W HCPCS

## 2024-01-31 PROCEDURE — 97161 PT EVAL LOW COMPLEX 20 MIN: CPT

## 2024-01-31 PROCEDURE — 97166 OT EVAL MOD COMPLEX 45 MIN: CPT

## 2024-01-31 PROCEDURE — 87086 URINE CULTURE/COLONY COUNT: CPT

## 2024-01-31 RX ORDER — POLYETHYLENE GLYCOL 3350 17 G/17G
17 POWDER, FOR SOLUTION ORAL DAILY
Status: DISCONTINUED | OUTPATIENT
Start: 2024-01-31 | End: 2024-01-31 | Stop reason: HOSPADM

## 2024-01-31 RX ORDER — DOCUSATE SODIUM 100 MG/1
100 CAPSULE, LIQUID FILLED ORAL DAILY
Status: DISCONTINUED | OUTPATIENT
Start: 2024-01-31 | End: 2024-01-31 | Stop reason: HOSPADM

## 2024-01-31 RX ORDER — LEVOFLOXACIN 250 MG/1
250 TABLET, FILM COATED ORAL DAILY
Qty: 5 TABLET | Refills: 0 | Status: SHIPPED | OUTPATIENT
Start: 2024-01-31 | End: 2024-02-05

## 2024-01-31 RX ADMIN — DOCUSATE SODIUM 100 MG: 100 CAPSULE, LIQUID FILLED ORAL at 09:43

## 2024-01-31 RX ADMIN — LORAZEPAM 1 MG: 1 TABLET ORAL at 04:27

## 2024-01-31 RX ADMIN — LEVOTHYROXINE SODIUM 50 MCG: 0.03 TABLET ORAL at 06:22

## 2024-01-31 RX ADMIN — GABAPENTIN 300 MG: 300 CAPSULE ORAL at 09:52

## 2024-01-31 RX ADMIN — ESCITALOPRAM OXALATE 20 MG: 10 TABLET ORAL at 09:43

## 2024-01-31 RX ADMIN — FUROSEMIDE 20 MG: 20 TABLET ORAL at 09:43

## 2024-01-31 RX ADMIN — BUPRENORPHINE HYDROCHLORIDE AND NALOXONE HYDROCHLORIDE DIHYDRATE 1 TABLET: 2; .5 TABLET SUBLINGUAL at 10:56

## 2024-01-31 RX ADMIN — PANTOPRAZOLE SODIUM 40 MG: 40 TABLET, DELAYED RELEASE ORAL at 06:22

## 2024-01-31 RX ADMIN — POLYETHYLENE GLYCOL 3350 17 G: 17 POWDER, FOR SOLUTION ORAL at 09:43

## 2024-01-31 RX ADMIN — BUPRENORPHINE AND NALOXONE 1 FILM: 8; 2 FILM, SOLUBLE BUCCAL; SUBLINGUAL at 10:57

## 2024-01-31 RX ADMIN — Medication 100 MG: at 09:43

## 2024-01-31 RX ADMIN — WATER 1000 MG: 1 INJECTION INTRAMUSCULAR; INTRAVENOUS; SUBCUTANEOUS at 09:43

## 2024-01-31 NOTE — PLAN OF CARE
Problem: Discharge Planning  Goal: Discharge to home or other facility with appropriate resources  1/25/2024 2334 by Abbey Bagley RN  Outcome: Progressing  Flowsheets (Taken 1/25/2024 2334)  Discharge to home or other facility with appropriate resources:   Identify barriers to discharge with patient and caregiver   Identify discharge learning needs (meds, wound care, etc)   Refer to discharge planning if patient needs post-hospital services based on physician order or complex needs related to functional status, cognitive ability or social support system   Arrange for needed discharge resources and transportation as appropriate     Problem: Pain  Goal: Verbalizes/displays adequate comfort level or baseline comfort level  1/25/2024 2334 by Abbey Bagley RN  Outcome: Progressing  Flowsheets (Taken 1/25/2024 2334)  Verbalizes/displays adequate comfort level or baseline comfort level:   Encourage patient to monitor pain and request assistance   Administer analgesics based on type and severity of pain and evaluate response   Consider cultural and social influences on pain and pain management   Assess pain using appropriate pain scale   Implement non-pharmacological measures as appropriate and evaluate response   Notify Licensed Independent Practitioner if interventions unsuccessful or patient reports new pain     Problem: Safety - Adult  Goal: Free from fall injury  1/25/2024 2334 by Abbey Bagley RN  Outcome: Progressing  Flowsheets (Taken 1/25/2024 2334)  Free From Fall Injury: Instruct family/caregiver on patient safety     Problem: ABCDS Injury Assessment  Goal: Absence of physical injury  1/25/2024 2334 by Abbey Bagley RN  Outcome: Progressing  Flowsheets (Taken 1/25/2024 2334)  Absence of Physical Injury: Implement safety measures based on patient assessment     
  Problem: Discharge Planning  Goal: Discharge to home or other facility with appropriate resources  1/26/2024 1104 by Aurelia Garay RN  Outcome: Progressing  Flowsheets (Taken 1/26/2024 1104)  Discharge to home or other facility with appropriate resources: Identify barriers to discharge with patient and caregiver  1/25/2024 2334 by Abbey Bagley RN  Outcome: Progressing  Flowsheets (Taken 1/25/2024 2334)  Discharge to home or other facility with appropriate resources:   Identify barriers to discharge with patient and caregiver   Identify discharge learning needs (meds, wound care, etc)   Refer to discharge planning if patient needs post-hospital services based on physician order or complex needs related to functional status, cognitive ability or social support system   Arrange for needed discharge resources and transportation as appropriate     Problem: Pain  Goal: Verbalizes/displays adequate comfort level or baseline comfort level  1/26/2024 1104 by Aurelia Garay RN  Outcome: Progressing  Flowsheets (Taken 1/26/2024 1104)  Verbalizes/displays adequate comfort level or baseline comfort level: Encourage patient to monitor pain and request assistance  1/25/2024 2334 by Abbey Bagley RN  Outcome: Progressing  Flowsheets (Taken 1/25/2024 2334)  Verbalizes/displays adequate comfort level or baseline comfort level:   Encourage patient to monitor pain and request assistance   Administer analgesics based on type and severity of pain and evaluate response   Consider cultural and social influences on pain and pain management   Assess pain using appropriate pain scale   Implement non-pharmacological measures as appropriate and evaluate response   Notify Licensed Independent Practitioner if interventions unsuccessful or patient reports new pain     Problem: Safety - Adult  Goal: Free from fall injury  1/26/2024 1104 by Aurelia Garay RN  Outcome: Progressing  Flowsheets  Taken 1/26/2024 1104 by 
  Problem: Discharge Planning  Goal: Discharge to home or other facility with appropriate resources  1/26/2024 2258 by Re Tomlin RN  Outcome: Progressing  1/26/2024 1104 by Aurelia Garay RN  Outcome: Progressing  Flowsheets (Taken 1/26/2024 1104)  Discharge to home or other facility with appropriate resources: Identify barriers to discharge with patient and caregiver     Problem: Pain  Goal: Verbalizes/displays adequate comfort level or baseline comfort level  1/26/2024 2258 by Re Tomlin RN  Outcome: Progressing  1/26/2024 1104 by Aurelia Garay RN  Outcome: Progressing  Flowsheets (Taken 1/26/2024 1104)  Verbalizes/displays adequate comfort level or baseline comfort level: Encourage patient to monitor pain and request assistance     Problem: Safety - Adult  Goal: Free from fall injury  1/26/2024 2258 by Re Tomlin RN  Outcome: Progressing  1/26/2024 1104 by Aurelia Garay RN  Outcome: Progressing  Flowsheets  Taken 1/26/2024 1104 by Aurelia Garay RN  Free From Fall Injury: Instruct family/caregiver on patient safety  Taken 1/25/2024 2335 by Abbey Bagley RN  Free From Fall Injury: Instruct family/caregiver on patient safety     Problem: ABCDS Injury Assessment  Goal: Absence of physical injury  1/26/2024 2258 by Re Tomlin RN  Outcome: Progressing  1/26/2024 1104 by Aurelia Garay RN  Outcome: Progressing  Flowsheets  Taken 1/26/2024 1104 by Aurelia Garay RN  Absence of Physical Injury: Implement safety measures based on patient assessment  Taken 1/25/2024 2335 by Abbey Bagley RN  Absence of Physical Injury: Implement safety measures based on patient assessment     Problem: Nutrition Deficit:  Goal: Optimize nutritional status  1/26/2024 2258 by Re Tomlin RN  Outcome: Progressing  1/26/2024 1104 by Aurelia Garay RN  Outcome: Progressing  Flowsheets (Taken 1/26/2024 1104)  Nutrient intake appropriate for improving, restoring, or 
  Problem: Discharge Planning  Goal: Discharge to home or other facility with appropriate resources  1/28/2024 1056 by Nickie Mulligan, RN  Outcome: Progressing  
  Problem: Discharge Planning  Goal: Discharge to home or other facility with appropriate resources  1/29/2024 0021 by Clarisse Mcfadden RN  Outcome: Progressing  1/28/2024 1056 by Nickie Mulligan RN  Outcome: Progressing     Problem: Pain  Goal: Verbalizes/displays adequate comfort level or baseline comfort level  1/29/2024 0021 by Clarisse Mcfadden RN  Outcome: Progressing  1/28/2024 1056 by Nickie Mulligan RN  Outcome: Progressing     Problem: Safety - Adult  Goal: Free from fall injury  1/29/2024 0021 by Clarisse Mcfadden RN  Outcome: Progressing  1/28/2024 1056 by Nickie Mulligan RN  Outcome: Progressing     Problem: ABCDS Injury Assessment  Goal: Absence of physical injury  1/29/2024 0021 by Clarisse Mcfadden RN  Outcome: Progressing  1/28/2024 1056 by Nickie Mulligan RN  Outcome: Progressing     Problem: Nutrition Deficit:  Goal: Optimize nutritional status  1/29/2024 0021 by Clarisse Mcfadden RN  Outcome: Progressing  1/28/2024 1056 by Nickie Mulligan RN  Outcome: Progressing     
  Problem: Discharge Planning  Goal: Discharge to home or other facility with appropriate resources  1/29/2024 1328 by Gabriela Hillman RN  Outcome: Progressing  Flowsheets (Taken 1/29/2024 1325)  Discharge to home or other facility with appropriate resources: Identify barriers to discharge with patient and caregiver  1/29/2024 0021 by Clarisse Mcfadden RN  Outcome: Progressing     Problem: Pain  Goal: Verbalizes/displays adequate comfort level or baseline comfort level  1/29/2024 1328 by Gabriela Hillman RN  Outcome: Progressing  Flowsheets (Taken 1/26/2024 1104 by Aurelia Garay, RN)  Verbalizes/displays adequate comfort level or baseline comfort level: Encourage patient to monitor pain and request assistance  1/29/2024 0021 by Clarisse Mcfadden RN  Outcome: Progressing     Problem: Safety - Adult  Goal: Free from fall injury  1/29/2024 1328 by Gabriela Hillman RN  Outcome: Progressing  Flowsheets (Taken 1/29/2024 0022 by Clarisse Mcfadden RN)  Free From Fall Injury: Instruct family/caregiver on patient safety  1/29/2024 0021 by Clarisse Mcfadden RN  Outcome: Progressing     Problem: ABCDS Injury Assessment  Goal: Absence of physical injury  1/29/2024 1328 by Gabriela Hillman RN  Outcome: Progressing  Flowsheets (Taken 1/29/2024 0022 by Clarisse Mcfadden RN)  Absence of Physical Injury: Implement safety measures based on patient assessment  1/29/2024 0021 by Clarisse Mcfadden RN  Outcome: Progressing     Problem: Nutrition Deficit:  Goal: Optimize nutritional status  1/29/2024 1328 by Gabriela Hillman RN  Outcome: Progressing  Flowsheets (Taken 1/26/2024 1104 by Aurelia Garay, RN)  Nutrient intake appropriate for improving, restoring, or maintaining nutritional needs: Assess nutritional status and recommend course of action  1/29/2024 0021 by Clarisse Mcfadden RN  Outcome: Progressing     
  Problem: Discharge Planning  Goal: Discharge to home or other facility with appropriate resources  1/29/2024 2309 by Valentine Conklin RN  Outcome: Progressing  Flowsheets (Taken 1/29/2024 2042)  Discharge to home or other facility with appropriate resources:   Identify barriers to discharge with patient and caregiver   Arrange for needed discharge resources and transportation as appropriate     Problem: Pain  Goal: Verbalizes/displays adequate comfort level or baseline comfort level  1/29/2024 2309 by Valentine Conklin RN  Outcome: Progressing     Problem: Safety - Adult  Goal: Free from fall injury  1/29/2024 2309 by Valentine Conklin RN  Outcome: Progressing     Problem: ABCDS Injury Assessment  Goal: Absence of physical injury  1/29/2024 2309 by Valentine oCnklin RN  Outcome: Progressing  Flowsheets (Taken 1/29/2024 2217)  Absence of Physical Injury: Implement safety measures based on patient assessment     Problem: Nutrition Deficit:  Goal: Optimize nutritional status  1/29/2024 2309 by Valentine Conklin RN  Outcome: Progressing     
  Problem: Discharge Planning  Goal: Discharge to home or other facility with appropriate resources  1/31/2024 0133 by Jolanta Davis RN  Outcome: Progressing  Flowsheets (Taken 1/30/2024 1229 by Aurelia Garay RN)  Discharge to home or other facility with appropriate resources: Identify barriers to discharge with patient and caregiver  1/30/2024 1229 by Aurelia Garay RN  Outcome: Progressing  Flowsheets (Taken 1/30/2024 1229)  Discharge to home or other facility with appropriate resources: Identify barriers to discharge with patient and caregiver     Problem: Pain  Goal: Verbalizes/displays adequate comfort level or baseline comfort level  1/31/2024 0133 by Jolanta Davis RN  Outcome: Progressing  Flowsheets (Taken 1/30/2024 1229 by Garay, Aurelia, RN)  Verbalizes/displays adequate comfort level or baseline comfort level: Encourage patient to monitor pain and request assistance  1/30/2024 1229 by Aurelia Garay RN  Outcome: Progressing  Flowsheets (Taken 1/30/2024 1229)  Verbalizes/displays adequate comfort level or baseline comfort level: Encourage patient to monitor pain and request assistance     Problem: Safety - Adult  Goal: Free from fall injury  1/31/2024 0133 by Jolanta Davis RN  Outcome: Progressing  Flowsheets (Taken 1/30/2024 1229 by Aurelia Garay RN)  Free From Fall Injury: Instruct family/caregiver on patient safety  1/30/2024 1229 by Aurelia Garay RN  Outcome: Progressing  Flowsheets (Taken 1/30/2024 1229)  Free From Fall Injury: Instruct family/caregiver on patient safety     Problem: ABCDS Injury Assessment  Goal: Absence of physical injury  1/31/2024 0133 by Jolanta Davis RN  Outcome: Progressing  Flowsheets (Taken 1/30/2024 1229 by Aurelia Garay, RN)  Absence of Physical Injury: Implement safety measures based on patient assessment  1/30/2024 1229 by Aurelia Garay RN  Outcome: Progressing  Flowsheets (Taken 1/30/2024 1229)  Absence of Physical Injury: 
  Problem: Discharge Planning  Goal: Discharge to home or other facility with appropriate resources  1/31/2024 1810 by Aurelia Garay RN  Outcome: Completed  1/31/2024 0722 by Aurelia Garay RN  Outcome: Progressing  Flowsheets (Taken 1/31/2024 0722)  Discharge to home or other facility with appropriate resources: Identify barriers to discharge with patient and caregiver     Problem: Pain  Goal: Verbalizes/displays adequate comfort level or baseline comfort level  1/31/2024 1810 by Aurelia Garay RN  Outcome: Completed  1/31/2024 0722 by Aurelia Garay RN  Outcome: Progressing  Flowsheets (Taken 1/31/2024 0722)  Verbalizes/displays adequate comfort level or baseline comfort level: Encourage patient to monitor pain and request assistance     Problem: Safety - Adult  Goal: Free from fall injury  1/31/2024 1810 by Aurelia Garay RN  Outcome: Completed  1/31/2024 0722 by Aurelia Garay RN  Outcome: Progressing  Flowsheets (Taken 1/31/2024 0722)  Free From Fall Injury: Instruct family/caregiver on patient safety     Problem: ABCDS Injury Assessment  Goal: Absence of physical injury  1/31/2024 1810 by Aurelia Garay RN  Outcome: Completed  1/31/2024 0722 by Aurelia Garay RN  Outcome: Progressing  Flowsheets (Taken 1/31/2024 0722)  Absence of Physical Injury: Implement safety measures based on patient assessment     Problem: Nutrition Deficit:  Goal: Optimize nutritional status  1/31/2024 1810 by Aurelia Garay RN  Outcome: Completed  1/31/2024 0722 by Aurelia Garay RN  Outcome: Progressing  Flowsheets (Taken 1/31/2024 0722)  Nutrient intake appropriate for improving, restoring, or maintaining nutritional needs: Assess nutritional status and recommend course of action     Problem: Skin/Tissue Integrity  Goal: Absence of new skin breakdown  Description: 1.  Monitor for areas of redness and/or skin breakdown  2.  Assess vascular access sites hourly  3.  Every 4-6 
  Problem: Discharge Planning  Goal: Discharge to home or other facility with appropriate resources  Outcome: Progressing     Problem: Pain  Goal: Verbalizes/displays adequate comfort level or baseline comfort level  Outcome: Progressing     Problem: Safety - Adult  Goal: Free from fall injury  Outcome: Progressing     Problem: ABCDS Injury Assessment  Goal: Absence of physical injury  Outcome: Progressing     Problem: Nutrition Deficit:  Goal: Optimize nutritional status  Outcome: Progressing     
  Problem: Discharge Planning  Goal: Discharge to home or other facility with appropriate resources  Outcome: Progressing  Flowsheets (Taken 1/25/2024 1554)  Discharge to home or other facility with appropriate resources: Identify barriers to discharge with patient and caregiver     Problem: Pain  Goal: Verbalizes/displays adequate comfort level or baseline comfort level  Outcome: Progressing  Flowsheets (Taken 1/25/2024 1554)  Verbalizes/displays adequate comfort level or baseline comfort level: Encourage patient to monitor pain and request assistance     Problem: Safety - Adult  Goal: Free from fall injury  Outcome: Progressing  Flowsheets (Taken 1/25/2024 1554)  Free From Fall Injury: Instruct family/caregiver on patient safety     Problem: ABCDS Injury Assessment  Goal: Absence of physical injury  Outcome: Progressing  Flowsheets (Taken 1/25/2024 1554)  Absence of Physical Injury: Implement safety measures based on patient assessment     
  Problem: Pain  Goal: Verbalizes/displays adequate comfort level or baseline comfort level  1/27/2024 0856 by Abel Rios, RN  Outcome: Progressing  1/26/2024 0354 by Re Tomlin, RN  Outcome: Progressing     
Implement safety measures based on patient assessment     Problem: Nutrition Deficit:  Goal: Optimize nutritional status  1/31/2024 0722 by Aurelia Garay, RN  Outcome: Progressing  Flowsheets (Taken 1/31/2024 0722)  Nutrient intake appropriate for improving, restoring, or maintaining nutritional needs: Assess nutritional status and recommend course of action  1/31/2024 0133 by Jolanta Davis RN  Outcome: Progressing  Flowsheets (Taken 1/30/2024 1229 by Aurelia Garay RN)  Nutrient intake appropriate for improving, restoring, or maintaining nutritional needs: Assess nutritional status and recommend course of action     Problem: Skin/Tissue Integrity  Goal: Absence of new skin breakdown  Description: 1.  Monitor for areas of redness and/or skin breakdown  2.  Assess vascular access sites hourly  3.  Every 4-6 hours minimum:  Change oxygen saturation probe site  4.  Every 4-6 hours:  If on nasal continuous positive airway pressure, respiratory therapy assess nares and determine need for appliance change or resting period.  Outcome: Progressing     
improving, restoring, or maintaining nutritional needs: Assess nutritional status and recommend course of action  1/29/2024 2309 by Valentine Conklin, RN  Outcome: Progressing

## 2024-01-31 NOTE — CARE COORDINATION
DISCHARGE SUMMARY     DATE OF DISCHARGE: 1/31/24  DISCHARGE DESTINATION: Home      COMMENTS: Patient is discharging to home with boyfriend (Melvin [email or web address]@ [phone]).  He states she gets her suboxone from [facility] weekly. He is aware MD has suggested she refrain from any ETOH (he buys it for her) and has been informed she is going home on an antibiotic for a UTI.

## 2024-01-31 NOTE — PROGRESS NOTES
This RN spoke with Ana Pitt CNP over the phone who plans to start IV antibiotics and asked for post void residual measurement to be done.   
0900- Morning assessments and vital signs complete. Patient given scheduled medications as prescribed. PRN ativan given for CIWA score of 11. Medication effective per patient. Call light is within reach.    1030- Potassium noted to be 3.3. Notified . Awaiting response    
Acknowledged Dr. Denis's discharge order, spoke with patient who is okay with discharge. This RN spoke with Pricila with social work who states she spoke with boyfriend who should be up this evening to get patient.    
Comprehensive Nutrition Assessment    Type and Reason for Visit:  Positive Nutrition Screen    Nutrition Recommendations/Plan:   Monitor for start of PO diet.  Start most appropriate supplement once daily with breakfast.      Malnutrition Assessment:  Malnutrition Status:  At risk for malnutrition (Comment) (poor diet acceptance x1 week prior to admission.) (01/26/24 1314)    Context:  Acute Illness       Nutrition Assessment:    MST=2. Briefly met with Pt. prior to EGD. Pt. was sleeping when this writer entered the room, woke to name call. Currently she is NPO for EGD, we reviewed her recent struggles with appetite. stating she gets bouts of severe nausea and cannot bring herself to eat. When asked how often this occurs she suggests 1-2x/month but could not say confidently. We discussed plan for admission. She is agreeable to supplementation to correct recent poor po acceptance. We agreed to most appropriate supplement according to diet order once daily. Supplement may advanace with diet as needed. Will continue to monitor nutritional adequacy and track diet acceptance.    Nutrition Related Findings:    Nutrition related labs reviewed. LB 1/23. Wound Type: None       Current Nutrition Intake & Therapies:    Average Meal Intake: %     Diet NPO    Anthropometric Measures:  Height: 170.2 cm (5' 7\")  Ideal Body Weight (IBW): 135 lbs (61 kg)       Current Body Weight: 64.4 kg (141 lb 15.6 oz),   IBW.    Current BMI (kg/m2): 22.2                          BMI Categories: Normal Weight (BMI 22.0 to 24.9) age over 65    Estimated Daily Nutrient Needs:  Energy Requirements Based On: Kcal/kg  Weight Used for Energy Requirements: Current  Energy (kcal/day): 1600-1920kcal (25-30kcal/kg)  Weight Used for Protein Requirements: Current  Protein (g/day): 64-77g (1-1.2g/kg)  Method Used for Fluid Requirements: 1 ml/kcal  Fluid (ml/day): 630mL    Nutrition Diagnosis:   Inadequate oral intake related to acute injury/trauma, 
Comprehensive Nutrition Assessment    Type and Reason for Visit:  Reassess    Nutrition Recommendations/Plan:   Continue current diet.  Start Ensure Clear BID.      Malnutrition Assessment:  Malnutrition Status:  Moderate malnutrition (01/30/24 1025)    Context:  Acute Illness     Findings of the 6 clinical characteristics of malnutrition:  Energy Intake:  50% or less of estimated energy requirements for 5 or more days  Weight Loss:  Greater than 2% over 1 week (8%)       Nutrition Assessment:    FU - diet upgraded to regular. Diet acceptance is improved somewhat but still insufficient. Noted significant weight loss throughout admission, Pt. has lost 8% of her total body weight within the week. Weight loss could be multifactoral, noted 20mg Lasix given BID along with NPO/clear status for most of admisison. Last bowel movement was 1/23 per documentation. Pt. had some difficulty staying awake, she was agreeable to Ensure clear BID. will update orders.  Recommending scheduled bowel regimen. If PO intake is not meeting estimated needs by next RD assessment, recommend more advanced forms of nutrition, discussed with Pt. and she is agreeable to advanced nutrition therapies if needed.    Nutrition Related Findings:    Nutrition related labs reviewed. LBM 1/23. Wound Type: None       Current Nutrition Intake & Therapies:    Average Meal Intake: 1-25%, 51-75%, %  Average Supplements Intake: None Ordered  ADULT DIET; Regular    Anthropometric Measures:  Height: 170.2 cm (5' 7\")  Ideal Body Weight (IBW): 135 lbs (61 kg)       Current Body Weight: 59.6 kg (131 lb 6.3 oz),   IBW.    Current BMI (kg/m2): 20.6                          BMI Categories: Normal Weight (BMI 18.5-24.9)    Estimated Daily Nutrient Needs:  Energy Requirements Based On: Kcal/kg  Weight Used for Energy Requirements: Usual  Energy (kcal/day): 1600-1920kcal (25-30kcal/kg)  Weight Used for Protein Requirements: Usual  Protein (g/day): 64-77g 
Consult    Asked to see from cholelithiasis    Patient with cirrhosis noted on CT and ultrasound    Abnormal LFT's noted as well    EGD with ulcer noted as well as esophageal varices    Cholecystectomy in this setting is contraindicated given elevated risk of bleeding and liver failure    Suspect gallbladder edema and wall thickening are due to liver disease rather than cholecystitis    If cholecystitis develops the treatment would be antibiotics alone    No surgical plans    Will sign off, please call with any questions      Electronically signed by ALONSO CORBETT MD on 1/26/2024 at 6:21 PM    
D: pt seems very drowsy at this time. pt has only received oral ativan and scheduled suboxone this am. vitals taken and vitals stable. pt is drowsy, but can be woken up, but falls asleep during conversation. However, pt received a visitor around 3 pm today and took a shower immediately after pt's boyfriend/son left. This RN is recommending urine drug screen. A: this RN sent secure message to Dr. Bowie with assessment findings/recommendations R: will continue to monitor pt  
D: pt's potassium was 3.3 this morning A: this RN gave pt 40 meq po per prn protocol R: will continue to monitor pt  
Department of Internal Medicine  General Internal Medicine  Attending Progress Note      SUBJECTIVE:  pt is having some shakiness, not feeling good, would like to stay one more day,Less nauseous, no bleeding. Encourage to get up and walk, has low grade temp,    OBJECTIVE      Medications    Current Facility-Administered Medications: potassium chloride (KLOR-CON M) extended release tablet 40 mEq, 40 mEq, Oral, PRN **OR** potassium bicarb-citric acid (EFFER-K) effervescent tablet 40 mEq, 40 mEq, Oral, PRN **OR** potassium chloride 10 mEq/100 mL IVPB (Peripheral Line), 10 mEq, IntraVENous, PRN  buprenorphine-naloxone (SUBOXONE) 8-2 MG SL film 1 Film, 1 Film, SubLINGual, BID  buprenorphine-naloxone (SUBOXONE) 2-0.5 MG SL tablet 1 tablet, 1 tablet, SubLINGual, Daily  sodium chloride flush 0.9 % injection 5-40 mL, 5-40 mL, IntraVENous, 2 times per day  sodium chloride flush 0.9 % injection 5-40 mL, 5-40 mL, IntraVENous, PRN  0.9 % sodium chloride infusion, , IntraVENous, PRN  escitalopram (LEXAPRO) tablet 20 mg, 20 mg, Oral, Daily  furosemide (LASIX) tablet 20 mg, 20 mg, Oral, BID  gabapentin (NEURONTIN) capsule 300 mg, 300 mg, Oral, TID  hydrOXYzine HCl (ATARAX) tablet 25 mg, 25 mg, Oral, TID PRN  levothyroxine (SYNTHROID) tablet 50 mcg, 50 mcg, Oral, Daily  ipratropium 0.5 mg-albuterol 2.5 mg (DUONEB) nebulizer solution 1 Dose, 1 Dose, Inhalation, Q4H PRN  mometasone-formoterol (DULERA) 100-5 MCG/ACT inhaler 2 puff, 2 puff, Inhalation, BID RT  pantoprazole (PROTONIX) tablet 40 mg, 40 mg, Oral, QAM AC  sodium chloride flush 0.9 % injection 5-40 mL, 5-40 mL, IntraVENous, 2 times per day  sodium chloride flush 0.9 % injection 5-40 mL, 5-40 mL, IntraVENous, PRN  0.9 % sodium chloride infusion, , IntraVENous, PRN  thiamine mononitrate tablet 100 mg, 100 mg, Oral, Daily  LORazepam (ATIVAN) tablet 1 mg, 1 mg, Oral, Q1H PRN **OR** LORazepam (ATIVAN) injection 1 mg, 1 mg, IntraVENous, Q1H PRN **OR** LORazepam (ATIVAN) tablet 2 
Dr. Bowie called back and ordered urine drug screen and ua for pt.   
Dr. Denis at bedside and states to this RN and to patient she wants patient discharged today. States to follow up with her in 1 week and she plans to send patient with medication to help with withdrawal symptoms.     This RN remained at bedside and helped patient to bathroom, unsteady on feet and needed contact guard assist. Patient has not urinated today, patient sat on toilet for about 10 minutes and could not urinate. Placed patient back in bed and bladder scan done - 350ml on scan. Patient also started to cry saying she doesn't feel like she is ready to be discharged due to her withdrawal symptoms. Asked patient again orientation questions and does not know correct date. I asked patient if she would want us to look into rehab places for her, she states no.     Plan to call Dr. Denis with above information.   
Endo called this RN to discuss planning for ordered EGD today. Consent order was placed and a repeat pregnancy test per protocol. Consent was signed by patient for EGD. Helped patient to bathroom to collect urine specimen, patient unable to urinate. This RN bladder scanned patient who had 50ml on scan. This RN called Endo again, was told over the phone patient can sign a right to treatment refusal form. Form was printed and signed by patient. Patient aware of continued NPO status for EGD today.   
Hospitalist Progress Note  1/26/2024 12:44 PM  Subjective:   Admit Date: 1/24/2024  PCP: Romi Denis MD  Interval History: pt feels better  For egd  No other complaints  Chart reviewed.  Diet: Diet NPO  Medications:   Scheduled Meds:   escitalopram  20 mg Oral Daily    furosemide  20 mg Oral BID    gabapentin  300 mg Oral TID    levothyroxine  50 mcg Oral Daily    mometasone-formoterol  2 puff Inhalation BID RT    pantoprazole  40 mg Oral QAM AC    sodium chloride flush  5-40 mL IntraVENous 2 times per day    thiamine  100 mg Oral Daily    benzocaine-menthol  1 lozenge Oral TID     Continuous Infusions:   sodium chloride       CBC:   Recent Labs     01/24/24 1832   WBC 6.5   HGB 11.3*   PLT 60*     BMP:    Recent Labs     01/24/24 1832 01/26/24 0458    137   K 3.3* 3.7   CL 97* 105   CO2 27 24   BUN 7 8   CREATININE 0.6 <0.5*   GLUCOSE 97 108*     Hepatic:   Recent Labs     01/24/24 1832 01/26/24  0458   * 118*   ALT 64* 54*   BILITOT 2.1* 1.4*   ALKPHOS 214* 182*     Troponin: No results for input(s): \"TROPONINI\" in the last 72 hours.  BNP: No results for input(s): \"BNP\" in the last 72 hours.  Lipids: No results for input(s): \"CHOL\", \"HDL\" in the last 72 hours.    Invalid input(s): \"LDLCALCU\"  INR:   Recent Labs     01/24/24 1832   INR 1.26*       Objective:   Vitals: BP (!) 158/103   Pulse 99   Temp 98 °F (36.7 °C) (Oral)   Resp 16   Ht 1.702 m (5' 7\")   Wt 64.4 kg (141 lb 15.6 oz)   LMP 01/17/2024 (Approximate)   SpO2 91%   BMI 22.24 kg/m²   General appearance: alert,awake,oriented x 3 and cooperative with exam  HEENT: Head: Normal, normocephalic, atraumatic, anicteric, pupils are reactive to light. Dry mucous membrane.  Neck: no adenopathy, no carotid bruit, supple, symmetrical, trachea midline and thyroid not enlarged, symmetric, no tenderness/mass/nodules  Lungs: clear to auscultation bilaterally  Heart: regular rate and rhythm, S1, S2 normal, no murmur, click, rub or 
Hospitalist Progress Note  1/30/2024 11:09 PM  Subjective:   Admit Date: 1/24/2024  PCP: Romi Denis MD  Interval History: pt is alert and awake  Irritable  Not able to pass the urine  weak  Agitated acc to the staff  No other complaints  Chart reviewed.  Diet: ADULT DIET; Regular  ADULT ORAL NUTRITION SUPPLEMENT; Lunch, Dinner; Clear Liquid Oral Supplement  Medications:   Scheduled Meds:   buprenorphine-naloxone  1 Film SubLINGual BID    buprenorphine-naloxone  1 tablet SubLINGual Daily    escitalopram  20 mg Oral Daily    furosemide  20 mg Oral BID    gabapentin  300 mg Oral TID    levothyroxine  50 mcg Oral Daily    mometasone-formoterol  2 puff Inhalation BID RT    pantoprazole  40 mg Oral QAM AC    sodium chloride flush  5-40 mL IntraVENous 2 times per day    thiamine  100 mg Oral Daily     Continuous Infusions:   sodium chloride       CBC:   Recent Labs     01/28/24  0951 01/29/24  0433 01/30/24  0424   WBC 5.0 4.5 4.7   HGB 10.4* 10.3* 10.4*   PLT 37* 42* 47*       BMP:    Recent Labs     01/28/24  0951 01/29/24  0432 01/30/24  0425    142 139   K 3.3* 3.1* 3.1*    102 99   CO2 31 34* 32   BUN 8 7 9   CREATININE 0.6 <0.5* 0.5*   GLUCOSE 99 96 88       Hepatic:   Recent Labs     01/28/24  0951 01/29/24  0432 01/30/24  0425   * 111* 127*   ALT 57* 58* 62*   BILITOT 1.6* 1.4* 1.5*   ALKPHOS 170* 156* 156*       Troponin: No results for input(s): \"TROPONINI\" in the last 72 hours.  BNP: No results for input(s): \"BNP\" in the last 72 hours.  Lipids: No results for input(s): \"CHOL\", \"HDL\" in the last 72 hours.    Invalid input(s): \"LDLCALCU\"  INR:   No results for input(s): \"INR\" in the last 72 hours.      Objective:   Vitals: /89   Pulse 99   Temp 98.2 °F (36.8 °C) (Oral)   Resp 16   Ht 1.702 m (5' 7\")   Wt 59.6 kg (131 lb 6.3 oz)   LMP 01/17/2024 (Approximate)   SpO2 95%   BMI 20.58 kg/m²   General appearance: alert,awake,oriented x 3 and cooperative with exam  HEENT: Head: 
INPATIENT CONSULTATION:    IDENTIFYING DATA/REASON FOR CONSULTATION   PATIENT:  Lali Cazares  MRN:  [id number]  ADMIT DATE: 1/24/2024  TIME OF EVALUATION: 1/28/2024 9:05 AM  HOSPITAL STAY:   LOS: 4 days   CONSULTING PHYSICIAN:  REASON FOR CONSULTATION:    Subjective:    Patient doing ok. No further vomiting. No reported bleeding. She continues to feel some ETOH withdrawal. CIWA 10     MEDICATIONS   SCHEDULED:  buprenorphine-naloxone, 1 Film, BID  buprenorphine-naloxone, 1 tablet, Daily  sodium chloride flush, 5-40 mL, 2 times per day  escitalopram, 20 mg, Daily  furosemide, 20 mg, BID  gabapentin, 300 mg, TID  levothyroxine, 50 mcg, Daily  mometasone-formoterol, 2 puff, BID RT  pantoprazole, 40 mg, QAM AC  sodium chloride flush, 5-40 mL, 2 times per day  thiamine, 100 mg, Daily  benzocaine-menthol, 1 lozenge, TID      FLUIDS/DRIPS:     sodium chloride      sodium chloride       PRNs: potassium chloride, 40 mEq, PRN   Or  potassium alternative oral replacement, 40 mEq, PRN   Or  potassium chloride, 10 mEq, PRN  sodium chloride flush, 5-40 mL, PRN  sodium chloride, , PRN  hydrOXYzine HCl, 25 mg, TID PRN  ipratropium 0.5 mg-albuterol 2.5 mg, 1 Dose, Q4H PRN  sodium chloride flush, 5-40 mL, PRN  sodium chloride, , PRN  LORazepam, 1 mg, Q1H PRN   Or  LORazepam, 1 mg, Q1H PRN   Or  LORazepam, 2 mg, Q1H PRN   Or  LORazepam, 2 mg, Q1H PRN   Or  LORazepam, 3 mg, Q1H PRN   Or  LORazepam, 3 mg, Q1H PRN   Or  LORazepam, 4 mg, Q1H PRN   Or  LORazepam, 4 mg, Q1H PRN  promethazine, 6.25 mg, Q6H PRN      ALLERGIES:  She [unfilled]      PHYSICAL EXAM   [unfilled]   I/O last 3 completed shifts:  In: 400 [P.O.:400]  Out: -   Oxygen Therapy:  @[id number])@  @[id number])@  @[id number])@    Physical Exam:  Gen: Resting in bed, NAD   CV: RRR no MRG   Pul: CTAB   Abd: Good bowel sounds throughout, no scars, soft, mild epigastric pain, no masses, no HSM   Ext: 1+ edema   Neuro: No asterixis 
INPATIENT CONSULTATION:    IDENTIFYING DATA/REASON FOR CONSULTATION   PATIENT:  Lali Cazares  MRN:  [id number]  ADMIT DATE: 1/24/2024  TIME OF EVALUATION: 1/27/2024 7:51 AM  HOSPITAL STAY:   LOS: 3 days   CONSULTING PHYSICIAN:  REASON FOR CONSULTATION:    Subjective:    Patient doing ok. No further vomiting. No reported bleeding. She continues to feel some ETOH withdrawal.     MEDICATIONS   SCHEDULED:  buprenorphine-naloxone, 1 Film, BID  buprenorphine-naloxone, 1 tablet, Daily  sodium chloride flush, 5-40 mL, 2 times per day  escitalopram, 20 mg, Daily  furosemide, 20 mg, BID  gabapentin, 300 mg, TID  levothyroxine, 50 mcg, Daily  mometasone-formoterol, 2 puff, BID RT  pantoprazole, 40 mg, QAM AC  sodium chloride flush, 5-40 mL, 2 times per day  thiamine, 100 mg, Daily  benzocaine-menthol, 1 lozenge, TID      FLUIDS/DRIPS:     sodium chloride      sodium chloride       PRNs: sodium chloride flush, 5-40 mL, PRN  sodium chloride, , PRN  hydrOXYzine HCl, 25 mg, TID PRN  ipratropium 0.5 mg-albuterol 2.5 mg, 1 Dose, Q4H PRN  sodium chloride flush, 5-40 mL, PRN  sodium chloride, , PRN  LORazepam, 1 mg, Q1H PRN   Or  LORazepam, 1 mg, Q1H PRN   Or  LORazepam, 2 mg, Q1H PRN   Or  LORazepam, 2 mg, Q1H PRN   Or  LORazepam, 3 mg, Q1H PRN   Or  LORazepam, 3 mg, Q1H PRN   Or  LORazepam, 4 mg, Q1H PRN   Or  LORazepam, 4 mg, Q1H PRN  promethazine, 6.25 mg, Q6H PRN      ALLERGIES:  She [unfilled]      PHYSICAL EXAM   [unfilled]   I/O last 3 completed shifts:  In: 3659.9 [P.O.:390; I.V.:3269.9]  Out: 400 [Urine:400]  Oxygen Therapy:  @[id number])@  @[id number])@  @[id number])@    Physical Exam:  Gen: Resting in bed, NAD   CV: RRR no MRG   Pul: CTAB   Abd: Good bowel sounds throughout, no scars, soft, mild epigastric pain, no masses, no HSM   Ext: 1+ edema   Neuro: No asterixis   Skin: No jaundice, spider angiomas, wheat erythema     LABS AND IMAGING     Recent Results (from the past 24 
IV removed by this RN, discharge instructions reviewed. Boyfriend at bedside who is providing transportation, taken out in wheelchair by PCA, all belongings and paperwork left with patient.   
Occupational Therapy  Lali Cazares  [id number]  [id number]    OT orders received and pt's chart reviewed. RN requesting to hold therapy at this time d/t need to straight cath and plan for transport to MRI. Will attempt again later as therapy schedule permits.     Brisa Rocha, OTR/L [id number]    
Patient arrived to PACU from Endo asleep.  ROSI  
Patient back to floor from EGD and initially lethargic but arousable, had no complaints. Held Gabapentin and Suboxone due to being lethargic.     Patient now awake and ate clear liquid tray, has noticeable tremors and feels anxious. CIWA score done and is 11, Ativan given. Vital signs stable.   
Patient back to floor from MRI, vital signs stable, alarm on.   
Patient continues to rest in bed at this time, A/Ox4. When patient came to floor from ED CIWA scale assessed and scored an 8, appropriate dose of Ativan given per orders. Patient also complained of itching, PRN Atarax given. Patient was able to eat dinner, asked patient if she had any abdominal pain, nausea or vomiting and she denies. Patient refused PM dose of Lasix, states she only takes this medication daily and had it this morning. Voices no additional needs at this time.   
Patient in bed at this time, wakes to voice, mumbles answers to questions and is hard to understand at times, speaks with eyes closed.     OT called this RN asking if they can see patient, relayed to OT to attempt. Will hold onto am medications and try to give later this morning once patient is more awake.   
Patient is in bed, quiet, awake. Respirations are even and unlabored. Bed is locked in lowest position with bed alarm on. Bedside table and call light are within reach. Patient is able to communicate needs.   
Patient is more awake than yesterday, sitting up in bed and is alert and oriented x4. Will administer Suboxone.  
Patient leaving floor to MRI.   
Patient leaving floor with transport to per-op for EGD. Pre-op checklist was completed before departure and patient in gown only.   
Patient placed on 1L nasal canula to return to room.   
Patient resting in bed upon assessment, A/Ox4 and vital signs stable, CIWA score 12, Ativan given per orders. Patient has no acute complaints, IVF infusing.     This RN called pharmacist to address unverified medications on patients MAR. Pharmacist states a message was sent by them to Dr. Denis with the information that patient states she does not take the Synthroid, Cariprazine or Methadone and she takes Suboxone. Orders not changed at this current time, will follow up with Dr. Denis when she does floor rounds today.   
Patient to bathroom with this RN, contact guard assist due to unsteadiness. Patient did not urinate, bladder scan done and 59ml on scan.   
Patient walking in hallway with PT.   
Physical Therapy  Facility/Department: [address] ORTHOPEDICS  Physical Therapy Initial Assessment / Discharge    Name: Lali Cazares  : 1975  MRN: [id number]  Date of Service: 2024    Discharge Recommendations:  Home with assist PRN     Lali Cazares scored a  on the AM-PAC short mobility form.     PT Equipment Recommendations  Equipment Needed: No      Patient Diagnosis(es): The primary encounter diagnosis was Alcohol withdrawal syndrome with complication (HCC). Diagnoses of QT prolongation, Elevated LFTs, Generalized abdominal pain, Nausea and vomiting, unspecified vomiting type, Abnormal CT of the abdomen, and Abdominal pain, unspecified abdominal location were also pertinent to this visit.  Past Medical History:  has a past medical history of Asthma, Depression, Drug dependence (HCC), Drug overdose, multiple drugs, Eczema of hand, ETOH abuse, Hepatitis C, Heroin withdrawal (HCC), MRSA infection, and Suicidal ideation.  Past Surgical History:  has a past surgical history that includes  section; Breast surgery; Abscess Drainage (Left, 2017); and Upper gastrointestinal endoscopy (N/A, 2024).    Assessment   Assessment: Patient reports indepemdent functional mobility without device, and independent ADLs prior to this hospitalization.  Status 24:  Bed Mobility Supervision. Transfers Supervision.  Gait without device 100' x 2 with CGA - SBA with Fair quality, likely due to sedation, but Good tolerance.  She performs 12 stairs as needed for home with SBA-CGA. Patient seems to be functioning /approaching baseline physical/functional status.  Deficts in function seem to be related to sedation as compared to any musculoskeleta deficit.  Will DC from services at this time.  DC per medical team.  Would recommend supervision/assist as needed due to sedation presentation.  Decision Making: Medium Complexity  History: as noted  Requires PT Follow-Up: No  Activity 
Pt alert and oriented x4. Pt scored 10 on CIWA, PRN ativan administered. See MAR. Pt tolerated night medication. Pt verbalized understanding of education. Pt up as tolerated. Needed items within reach. Call light within reach.   
Pt awake, alert oriented x3.  Unsure of correct date.  Ambulated to the BR to void.  Gait unsteady due to tremors.  Assessment completed.  CIWA score noted to be 13.  Pt medicated with PRN Ativan.  Snack given.  Urine sample sent to lab.  Pt denies any needs.  Call light in reach.  Will monitor.   
Pt c/o burning and pain with urination and feeling like she has to pee, but is unable to.  Also noted last time pt voided, urine was foul smelling.  New hat placed in toilet to send a UA next time pt goes.  Bladder scan also performed and noted to be 9 mL.  Will monitor.  
Pt encouraged to ambulate in room and get out of bed in order to help prevent pneumonia and to regain strength. Pt verbalized understanding of this. Pt asked to ambulate in room while this RN was in room. Pt got up and ambulated independently within room and returned to bed. Pt is complaining of a sore throat. Sore throat lozenge gave to pt as ordered. Pt encouraged to drink fluids. Chicken broth and popsicle given to pt.   
Pt in bed resting with family at bed side. Pt denies having any pain at this moment. Safety measures are in place call light tin reach be din lowset position. No further needs at this time. Electronically signed by Gabriela Hillman RN on 1/29/2024 at 4:52 PM   
Pt informed during bedside report that Dr. Bowie ordered to test pt's urine for infection and pt assisted by nursing staff to bathroom to see if pt could void for a urine sample. Pt very drowsy during transfer and unable to void, but white discharge found in hat after pt attempted to void, but no urine. Pt assisted back to bed and bed alarm turned on. New hat placed in bathroom. Nursing staff to attempt to obtain urine sample overnight. Will continue to monitor pt.   
Pt is alert and oriented x 4. Vital, assessment and daily care given. Education and care plan updated. Medication given as per order. Fall precaution initiated, call light within reach, bed in low position and wheels locked. Pt complains of mouth and neck soreness, nausea so reach out to dr Denis and got the verbal . Addressed the current pt's need. Continue to monitor.    Electronically signed by Abbey Bagley RN on 1/25/2024 at 11:53 PM    
Pt very groggy, answering questions with eyes closed, answers questions appropriately. Pregnancy test waiver on chart.  
Report called to Jorge Luis RN on 3W. Patient belongings packed at bedside. IV CDI with fluids running. Vitals stable.   
This RN spoke with Dr. Denis via telephone and updated her on information in the most recent nursing note. Dr. Denis states no discharge today, straight cath patient, Urology consult was placed, MRI of brain was order, PT/OT orders were placed.     Straight cath completed and charted, MRI check list complete.   
Transport placed.  
Upon assessment patient is lethargic, sweaty and has noticeable tremors. Patient is alert to self but cannot tell this RN the correct date or why she is here at the hospital. Patient opens eyes for a moment when spoken to then appears to fall back asleep. Any answers patients gives are mumbles and she is hard to understand. Patient did take medications with water without problems, did not give Suboxone. CIWA score 16.     This RN called and spoke with Dr. Denis with the above information. Dr. Denis states to not given Suboxone or Ativan for now and let patient wake up more before administration.   
Normal Effort  Heart: regular rate and rhythm, normal S1 and S2, no murmurs or rubs  Abdomen: soft, non-distended, non-tender. Bowel sounds normal.   Extremities: atraumatic, no cyanosis or edema  Skin: warm and dry, no jaundice  Neuro: Grossly intact, A&OX3    LABS AND IMAGING   Laboratory   Recent Labs     01/24/24 1832   WBC 6.5   HGB 11.3*   HCT 34.8*   MCV 79.1*   PLT 60*     Recent Labs     01/24/24 1832 01/26/24  0458    137   K 3.3* 3.7   CL 97* 105   CO2 27 24   BUN 7 8   CREATININE 0.6 <0.5*     Recent Labs     01/24/24 1832 01/26/24 0458   * 118*   ALT 64* 54*   BILITOT 2.1* 1.4*   ALKPHOS 214* 182*     Recent Labs     01/24/24 1832   LIPASE 21.0     Recent Labs     01/24/24 1832   PROTIME 15.8*   INR 1.26*         Imaging  CT ABDOMEN PELVIS WO CONTRAST Additional Contrast? None   Final Result   1. Hepatic steatosis with surface nodularity, concerning for cirrhosis.   2. Nonspecific gallbladder wall thickening, possibly due to underlying liver   dysfunction.   3. Enlarged spleen.         XR CHEST (2 VW)   Final Result   No acute cardiopulmonary process.         US GALLBLADDER RUQ    (Results Pending)       Endoscopy      ASSESSMENT AND RECOMMENDATIONS   Lali Cazares is a 48 y.o. female with PMH of alcohol abuse, HCV, cirrhosis, prior heroin use, asthma, anxiety, depression who presented on 1/24/2023 with nausea, vomiting, cough, generalized abdominal pain.     Nausea, vomiting, abdominal pain.  ?ETOH gastritis vs ETOH withdrawal.  CT with no acute findings.  LFTs elevated likely related to her alcohol consumption.  Prior ultrasound 2021 negative for gallstones.  Recommend supportive care, antiemetics as needed, PPI  Elevated LFTs likely 2/2 alcohol hepatitis.  AST greater than ALT.  Madrey DF less than 32, no indication for steroids at this time.  Encourage alcohol abstinence.  Suspected cirrhosis   -Etiology: likely due to EtOH/HCV.  Serological liver work up sent  -Ascites: 
anicteric, pupils are reactive to light. Dry mucous membrane.  Neck: no adenopathy, no carotid bruit, supple, symmetrical, trachea midline and thyroid not enlarged, symmetric, no tenderness/mass/nodules  Lungs: clear to auscultation bilaterally  Heart: regular rate and rhythm, S1, S2 normal, no murmur, click, rub or gallop  Abdomen: soft, non-tender; bowel sounds normal; no masses,  no organomegaly  Extremities: extremities normal, atraumatic, no cyanosis or edema  Neurologic: Mental status: Alert, awake    Assessment and Plan:   Nausea-better  Chronic cholecystitis-will ask surgery to see her  Cirrhosis-stable  Possible DTs- on ciwa protocol    Patient Active Problem List:     Dysthymic disorder     ETOH abuse     Eczema of hand     Asthma     Drug overdose, multiple drugs     Heroin withdrawal (HCC)     Suicidal ideation     Drug dependence (HCC)     Abscess of left forearm     IV drug abuse (HCC)     Encounter for smoking cessation counseling     Gangrene (HCC)     Cellulitis of left upper extremity     Abscess     Leukocytosis     Non-O157 Shiga toxin-producing Escherichia coli (E.coli)     Hep C w/o coma, chronic (HCC)     Recurrent genital herpes     Closed displaced fracture of right calcaneus     Current smoker     Abdominal pain     Alcohol withdrawal syndrome with complication (HCC)    Pt is stable.  Discussed with staff and pt about the plan.  See the orders for further plan.  Will proceed further acc to pt's progress.  Time spent with management of the pt is-20 minutes      Electronically signed by: Romi Denis MD, on 1/29/2024, at 2:41 PM    
care  Assistance / Modification: assist of 1  REQUIRES OT FOLLOW-UP: Yes  Activity Tolerance  Activity Tolerance: Treatment limited secondary to medical complications (free text);Patient limited by fatigue;Treatment limited secondary to decreased cognition        Plan   Occupational Therapy Plan  Times Per Week: 3-5  Current Treatment Recommendations: Strengthening, Balance training, Functional mobility training, Safety education & training, Self-Care / ADL, Home management training, Endurance training, Patient/Caregiver education & training, Equipment evaluation, education, & procurement     Restrictions  Restrictions/Precautions  Restrictions/Precautions: Fall Risk  Position Activity Restriction  Other position/activity restrictions: CIWA and going through IVDU withdrawal    Subjective   General  Chart Reviewed: Yes, Orders  Additional Pertinent Hx: Per Angela Wilcox CNP, \"Lali Cazares is a 48 y.o. female who presents complaining of nausea, vomiting, cough, generalized abdominal pain and \"anxiety.\"  She tells me that the symptoms have been going on for 3 to 4 days and that she typically drinks about 8 servings of liquor a day but has not had any since last night because of the vomiting.  She denies diarrhea or fevers.  Denies productive cough or chest pain.  She admits to crack cocaine use but says that she has not smoked any since 6 days ago.  She is a former IV drug user tells me she has been clean for a little over 6 years and on Suboxone currently.  She complains of itching with scabs and spots \"all over her body.\"  History of scabies.  She tells me she is not taking her thyroid medication\"  Family / Caregiver Present: No  Referring Practitioner: Romi Denis MD  Diagnosis: Abdominal pain  Subjective  Subjective: Seen in room, agreed to OT but complained of drowsiness.  Pt kept eyes open 50% of time, words slurred     Social/Functional History  Social/Functional History  Lives With: Family (Adult 
within the lumen of the gallbladder.  The  gallbladder wall is mildly thickened.  The common bile duct measures 5 mm.     RIGHT KIDNEY: The right kidney is unremarkable measuring 9.2 cm.  There is no  renal mass or hydronephrosis.     PANCREAS:  Visualized portions of the pancreas are unremarkable.     OTHER: No evidence of right upper quadrant ascites.     IMPRESSION:  1. Cirrhosis.  2. Gallbladder sludge with mild gallbladder wall thickening can be seen in  setting of chronic cholecystitis.     ASSESSMENT AND PLAN      Principal Problem:  Abd pain ,nausea, improved,  Alcohol abuse,  Cirrhosis,with Portal HTN ascites stable,  PUD w/o major bleed,  H& H 10/1/31.6  GB sludge w/o cholecystitis, no surgery this time,   IV  Drug abuse, on rehab, on Suboxone,  Dr Bowie covering dr Denis

## 2024-01-31 NOTE — CONSULTS
Consulting Physician:     Romi Denis MD       Reason for Consult: urinary obstruction    History of Present Illness: Lali Cazares is a 48 y.o. female PMH alcohol and substance abuse. She has been hospitalized since 24 for management of hypokalemia, chronic cholecystitis, liver cirrhosis, possible DTs. Urology has been consulted due to LUTS. UA on admission negative for infection, however reeat UA collected  c/w infection, nitrite positive. CTAP  is negative for  abnormalities, moderate amount of stool in colon. Patient was straight catheterized once yesterday for 300ml UOP because she was unable to void. Since then she has been voiding in small amounts, no PVRs recorded. No bowel movements have been documented this admission, reports she feels constipated. She also endorses UF, UU, feeling of incomplete emptying, this feels similar to prior UTIs she has had. Denies GH, dysuria.    Past Medical History:   Past Medical History:   Diagnosis Date    Asthma     Depression     Drug dependence (HCC)     Drug overdose, multiple drugs 3/17/2015    Eczema of hand 2012    ETOH abuse 2011    Hepatitis C 3/1/15    Heroin withdrawal (HCC)     MRSA infection 14    Suicidal ideation        Past Surgical History:  Past Surgical History:   Procedure Laterality Date    ABSCESS DRAINAGE Left 2017    I&D Left forearm deep abscess, VAC application    BREAST SURGERY       SECTION      UPPER GASTROINTESTINAL ENDOSCOPY N/A 2024    EGD BIOPSY performed by Raúl Tello MD at [facility] ENDOSCOPY       Social History:  Social History     Socioeconomic History    Marital status:      Spouse name: Not on file    Number of children: Not on file    Years of education: Not on file    Highest education level: Not on file   Occupational History    Not on file   Tobacco Use    Smoking status: Every Day     Current packs/day: 0.50     Types: Cigarettes    Smokeless 
asterixis on exam  -Variceal screening: Patient reports prior EGD couple years ago.  Will obtain report.  -Immunization status: As of 2018 not immune to hep B.  Will check hep A antibodies  -HCC screening.  No recent liver imaging.  CT this admission performed without contrast.  will check right upper quadrant ultrasound and AFP  HCV, genotype 3A, treatment naïve  Active alcohol abuse.  Last drink was 2 days ago.  Recommend CIWA protocol, thiamine, folate.    RECOMMENDATIONS:    Right upper quadrant ultrasound  AFP  Hep A antibody  Supportive care with antiemetics as needed  Daily PPI  Further input and recommendations from Dr. Tello to follow-up    If you have any questions or need any further information, please feel free to contact our consult team.  Thank you for allowing us to participate in the care of Lali Cazares.    The note was completed using Dragon voice recognition transcription. Every effort was made to ensure accuracy; however, inadvertent transcription errors may be present despite my best efforts to edit errors.    Jose R Ashley PA-C    Attending physician addendum:    I have personally seen and examined the patient, reviewed the patient's medical record and pertinent labs and clinical imaging.  I have personally staffed the case with Jose R BOWMAN.  I agree with her consultation note, exam findings, assessment and plans  as written above.  I have made appropriate modifications and edited her assessment and plan where needed to reflect my impression and plans for this patient.       Lali Cazares is a 48 y.o. female with a PMH of alcohol abuse, HCV, cirrhosis, prior heroin use, asthma, anxiety, depression who presented on 1/24/2023 with nausea, vomiting, cough, generalized abdominal pain.      New diagnosis of cirrhosis with concern for ETOH/HCV. Check complete serolgoic work-up.  No ascites. ETOH Hepatitis. DF no significant elevated. Consider EGD if no improvement in

## 2024-01-31 NOTE — CARE COORDINATION
Call received from patient's mother requesting outpatient follow up with patient for substance abuse treatment.   Called to Arlene with [facility] for mental health and substance abuse treatment. Confirmed she will follow up with patient post discharge for treatment options.     FOREIGN Contreras, LSW, Social Work/Case Management   [phone]  Electronically signed by FOREIGN Contreras on 1/31/2024 at 4:28 PM

## 2024-02-02 LAB
BACTERIA UR CULT: ABNORMAL
ORGANISM: ABNORMAL

## 2024-02-08 ENCOUNTER — HOSPITAL ENCOUNTER (INPATIENT)
Age: 49
LOS: 7 days | Discharge: HOME | DRG: 201 | End: 2024-02-15
Attending: EMERGENCY MEDICINE | Admitting: INTERNAL MEDICINE
Payer: COMMERCIAL

## 2024-02-08 ENCOUNTER — APPOINTMENT (OUTPATIENT)
Dept: GENERAL RADIOLOGY | Age: 49
DRG: 201 | End: 2024-02-08
Payer: COMMERCIAL

## 2024-02-08 DIAGNOSIS — F14.90 COCAINE USE: ICD-10-CM

## 2024-02-08 DIAGNOSIS — E87.6 HYPOKALEMIA: ICD-10-CM

## 2024-02-08 DIAGNOSIS — R79.89 ELEVATED TROPONIN: ICD-10-CM

## 2024-02-08 DIAGNOSIS — I47.29 POLYMORPHIC VENTRICULAR TACHYCARDIA (HCC): Primary | ICD-10-CM

## 2024-02-08 PROBLEM — F14.10 COCAINE ABUSE (HCC): Status: ACTIVE | Noted: 2024-02-08

## 2024-02-08 LAB
ALBUMIN SERPL-MCNC: 3.5 G/DL (ref 3.4–5)
ALBUMIN SERPL-MCNC: 4 G/DL (ref 3.4–5)
ALBUMIN/GLOB SERPL: 1.4 {RATIO} (ref 1.1–2.2)
ALBUMIN/GLOB SERPL: 1.4 {RATIO} (ref 1.1–2.2)
ALP SERPL-CCNC: 129 U/L (ref 40–129)
ALP SERPL-CCNC: 143 U/L (ref 40–129)
ALT SERPL-CCNC: 43 U/L (ref 10–40)
ALT SERPL-CCNC: 47 U/L (ref 10–40)
AMPHETAMINES UR QL SCN>1000 NG/ML: ABNORMAL
ANION GAP SERPL CALCULATED.3IONS-SCNC: 12 MMOL/L (ref 3–16)
ANION GAP SERPL CALCULATED.3IONS-SCNC: 24 MMOL/L (ref 3–16)
AST SERPL-CCNC: 63 U/L (ref 15–37)
AST SERPL-CCNC: 64 U/L (ref 15–37)
BARBITURATES UR QL SCN>200 NG/ML: ABNORMAL
BASOPHILS # BLD: 0 K/UL (ref 0–0.2)
BASOPHILS # BLD: 0.1 K/UL (ref 0–0.2)
BASOPHILS NFR BLD: 0.5 %
BASOPHILS NFR BLD: 1 %
BENZODIAZ UR QL SCN>200 NG/ML: ABNORMAL
BILIRUB SERPL-MCNC: 1.4 MG/DL (ref 0–1)
BILIRUB SERPL-MCNC: 1.8 MG/DL (ref 0–1)
BILIRUB UR QL STRIP.AUTO: NEGATIVE
BUN SERPL-MCNC: 7 MG/DL (ref 7–20)
BUN SERPL-MCNC: 7 MG/DL (ref 7–20)
CA-I BLD-SCNC: 1.08 MMOL/L (ref 1.12–1.32)
CALCIUM SERPL-MCNC: 7.8 MG/DL (ref 8.3–10.6)
CALCIUM SERPL-MCNC: 8.6 MG/DL (ref 8.3–10.6)
CANNABINOIDS UR QL SCN>50 NG/ML: ABNORMAL
CHLORIDE SERPL-SCNC: 93 MMOL/L (ref 99–110)
CHLORIDE SERPL-SCNC: 97 MMOL/L (ref 99–110)
CLARITY UR: CLEAR
CO2 SERPL-SCNC: 23 MMOL/L (ref 21–32)
CO2 SERPL-SCNC: 28 MMOL/L (ref 21–32)
COCAINE UR QL SCN: POSITIVE
COLOR UR: YELLOW
CREAT SERPL-MCNC: 0.7 MG/DL (ref 0.6–1.1)
CREAT SERPL-MCNC: 0.9 MG/DL (ref 0.6–1.1)
DEPRECATED RDW RBC AUTO: 19.9 % (ref 12.4–15.4)
DEPRECATED RDW RBC AUTO: 20 % (ref 12.4–15.4)
DRUG SCREEN COMMENT UR-IMP: ABNORMAL
EOSINOPHIL # BLD: 0.1 K/UL (ref 0–0.6)
EOSINOPHIL # BLD: 0.1 K/UL (ref 0–0.6)
EOSINOPHIL NFR BLD: 1 %
EOSINOPHIL NFR BLD: 1.4 %
FENTANYL SCREEN, URINE: ABNORMAL
GFR SERPLBLD CREATININE-BSD FMLA CKD-EPI: >60 ML/MIN/{1.73_M2}
GFR SERPLBLD CREATININE-BSD FMLA CKD-EPI: >60 ML/MIN/{1.73_M2}
GLUCOSE SERPL-MCNC: 103 MG/DL (ref 70–99)
GLUCOSE SERPL-MCNC: 87 MG/DL (ref 70–99)
GLUCOSE UR STRIP.AUTO-MCNC: NEGATIVE MG/DL
HCG SERPL QL: NEGATIVE
HCT VFR BLD AUTO: 33.6 % (ref 36–48)
HCT VFR BLD AUTO: 35 % (ref 36–48)
HGB BLD-MCNC: 10.8 G/DL (ref 12–16)
HGB BLD-MCNC: 11.1 G/DL (ref 12–16)
HGB UR QL STRIP.AUTO: NEGATIVE
INR PPP: 1.44 (ref 0.84–1.16)
KETONES UR STRIP.AUTO-MCNC: NEGATIVE MG/DL
LEUKOCYTE ESTERASE UR QL STRIP.AUTO: NEGATIVE
LYMPHOCYTES # BLD: 2 K/UL (ref 1–5.1)
LYMPHOCYTES # BLD: 3.2 K/UL (ref 1–5.1)
LYMPHOCYTES NFR BLD: 22.1 %
LYMPHOCYTES NFR BLD: 31 %
MAGNESIUM SERPL-MCNC: 1.7 MG/DL (ref 1.8–2.4)
MAGNESIUM SERPL-MCNC: 3.8 MG/DL (ref 1.8–2.4)
MCH RBC QN AUTO: 25.3 PG (ref 26–34)
MCH RBC QN AUTO: 25.5 PG (ref 26–34)
MCHC RBC AUTO-ENTMCNC: 31.6 G/DL (ref 31–36)
MCHC RBC AUTO-ENTMCNC: 32 G/DL (ref 31–36)
MCV RBC AUTO: 79.5 FL (ref 80–100)
MCV RBC AUTO: 80.1 FL (ref 80–100)
METHADONE UR QL SCN>300 NG/ML: ABNORMAL
MONOCYTES # BLD: 1 K/UL (ref 0–1.3)
MONOCYTES # BLD: 1.1 K/UL (ref 0–1.3)
MONOCYTES NFR BLD: 10.7 %
MONOCYTES NFR BLD: 10.8 %
NEUTROPHILS # BLD: 5.7 K/UL (ref 1.7–7.7)
NEUTROPHILS # BLD: 5.8 K/UL (ref 1.7–7.7)
NEUTROPHILS NFR BLD: 56.3 %
NEUTROPHILS NFR BLD: 65.2 %
NITRITE UR QL STRIP.AUTO: NEGATIVE
NT-PROBNP SERPL-MCNC: 1835 PG/ML (ref 0–124)
OPIATES UR QL SCN>300 NG/ML: ABNORMAL
OXYCODONE UR QL SCN: ABNORMAL
PCP UR QL SCN>25 NG/ML: ABNORMAL
PERFORMED ON: NORMAL
PH BLDV: 7.46 [PH] (ref 7.35–7.45)
PH UR STRIP.AUTO: 7 [PH] (ref 5–8)
PH UR STRIP: 7 [PH]
PLATELET # BLD AUTO: 140 K/UL (ref 135–450)
PLATELET # BLD AUTO: 196 K/UL (ref 135–450)
PMV BLD AUTO: 8.3 FL (ref 5–10.5)
PMV BLD AUTO: 8.5 FL (ref 5–10.5)
POC SAMPLE TYPE: NORMAL
POTASSIUM BLD-SCNC: 4.4 MMOL/L (ref 3.5–5.1)
POTASSIUM SERPL-SCNC: 2.1 MMOL/L (ref 3.5–5.1)
POTASSIUM SERPL-SCNC: 3.1 MMOL/L (ref 3.5–5.1)
PROT SERPL-MCNC: 6 G/DL (ref 6.4–8.2)
PROT SERPL-MCNC: 6.8 G/DL (ref 6.4–8.2)
PROT UR STRIP.AUTO-MCNC: NEGATIVE MG/DL
PROTHROMBIN TIME: 17.5 SEC (ref 11.5–14.8)
RBC # BLD AUTO: 4.23 M/UL (ref 4–5.2)
RBC # BLD AUTO: 4.37 M/UL (ref 4–5.2)
SODIUM SERPL-SCNC: 137 MMOL/L (ref 136–145)
SODIUM SERPL-SCNC: 140 MMOL/L (ref 136–145)
SP GR UR STRIP.AUTO: 1.01 (ref 1–1.03)
TROPONIN, HIGH SENSITIVITY: 16 NG/L (ref 0–14)
TROPONIN, HIGH SENSITIVITY: 20 NG/L (ref 0–14)
UA COMPLETE W REFLEX CULTURE PNL UR: NORMAL
UA DIPSTICK W REFLEX MICRO PNL UR: NORMAL
URN SPEC COLLECT METH UR: NORMAL
UROBILINOGEN UR STRIP-ACNC: 1 E.U./DL
WBC # BLD AUTO: 10.2 K/UL (ref 4–11)
WBC # BLD AUTO: 9 K/UL (ref 4–11)

## 2024-02-08 PROCEDURE — 96361 HYDRATE IV INFUSION ADD-ON: CPT

## 2024-02-08 PROCEDURE — 83735 ASSAY OF MAGNESIUM: CPT

## 2024-02-08 PROCEDURE — 84703 CHORIONIC GONADOTROPIN ASSAY: CPT

## 2024-02-08 PROCEDURE — 71045 X-RAY EXAM CHEST 1 VIEW: CPT

## 2024-02-08 PROCEDURE — 81003 URINALYSIS AUTO W/O SCOPE: CPT

## 2024-02-08 PROCEDURE — 96375 TX/PRO/DX INJ NEW DRUG ADDON: CPT

## 2024-02-08 PROCEDURE — 2580000003 HC RX 258: Performed by: REGISTERED NURSE

## 2024-02-08 PROCEDURE — 83880 ASSAY OF NATRIURETIC PEPTIDE: CPT

## 2024-02-08 PROCEDURE — 36415 COLL VENOUS BLD VENIPUNCTURE: CPT

## 2024-02-08 PROCEDURE — 2500000003 HC RX 250 WO HCPCS: Performed by: EMERGENCY MEDICINE

## 2024-02-08 PROCEDURE — 96368 THER/DIAG CONCURRENT INF: CPT

## 2024-02-08 PROCEDURE — 2000000000 HC ICU R&B

## 2024-02-08 PROCEDURE — 6360000002 HC RX W HCPCS: Performed by: INTERNAL MEDICINE

## 2024-02-08 PROCEDURE — 6370000000 HC RX 637 (ALT 250 FOR IP): Performed by: INTERNAL MEDICINE

## 2024-02-08 PROCEDURE — 93005 ELECTROCARDIOGRAM TRACING: CPT | Performed by: EMERGENCY MEDICINE

## 2024-02-08 PROCEDURE — 84132 ASSAY OF SERUM POTASSIUM: CPT

## 2024-02-08 PROCEDURE — 85025 COMPLETE CBC W/AUTO DIFF WBC: CPT

## 2024-02-08 PROCEDURE — 6360000002 HC RX W HCPCS: Performed by: EMERGENCY MEDICINE

## 2024-02-08 PROCEDURE — 2580000003 HC RX 258: Performed by: EMERGENCY MEDICINE

## 2024-02-08 PROCEDURE — 80053 COMPREHEN METABOLIC PANEL: CPT

## 2024-02-08 PROCEDURE — 84484 ASSAY OF TROPONIN QUANT: CPT

## 2024-02-08 PROCEDURE — 85610 PROTHROMBIN TIME: CPT

## 2024-02-08 PROCEDURE — 82330 ASSAY OF CALCIUM: CPT

## 2024-02-08 PROCEDURE — 99285 EMERGENCY DEPT VISIT HI MDM: CPT

## 2024-02-08 PROCEDURE — 6370000000 HC RX 637 (ALT 250 FOR IP): Performed by: EMERGENCY MEDICINE

## 2024-02-08 PROCEDURE — 96365 THER/PROPH/DIAG IV INF INIT: CPT

## 2024-02-08 PROCEDURE — 80307 DRUG TEST PRSMV CHEM ANLYZR: CPT

## 2024-02-08 PROCEDURE — 93005 ELECTROCARDIOGRAM TRACING: CPT | Performed by: INTERNAL MEDICINE

## 2024-02-08 PROCEDURE — 96366 THER/PROPH/DIAG IV INF ADDON: CPT

## 2024-02-08 PROCEDURE — 2580000003 HC RX 258: Performed by: INTERNAL MEDICINE

## 2024-02-08 PROCEDURE — 51702 INSERT TEMP BLADDER CATH: CPT

## 2024-02-08 RX ORDER — ESCITALOPRAM OXALATE 10 MG/1
20 TABLET ORAL DAILY
Status: DISCONTINUED | OUTPATIENT
Start: 2024-02-09 | End: 2024-02-11

## 2024-02-08 RX ORDER — ASPIRIN 81 MG/1
81 TABLET, CHEWABLE ORAL DAILY
Status: DISCONTINUED | OUTPATIENT
Start: 2024-02-08 | End: 2024-02-09

## 2024-02-08 RX ORDER — POTASSIUM CHLORIDE 20 MEQ/1
40 TABLET, EXTENDED RELEASE ORAL PRN
Status: DISCONTINUED | OUTPATIENT
Start: 2024-02-08 | End: 2024-02-15 | Stop reason: HOSPADM

## 2024-02-08 RX ORDER — BUPRENORPHINE HYDROCHLORIDE AND NALOXONE HYDROCHLORIDE DIHYDRATE 8; 2 MG/1; MG/1
1 TABLET SUBLINGUAL 2 TIMES DAILY
Status: DISCONTINUED | OUTPATIENT
Start: 2024-02-08 | End: 2024-02-11

## 2024-02-08 RX ORDER — ENOXAPARIN SODIUM 100 MG/ML
40 INJECTION SUBCUTANEOUS NIGHTLY
Status: DISCONTINUED | OUTPATIENT
Start: 2024-02-08 | End: 2024-02-15 | Stop reason: HOSPADM

## 2024-02-08 RX ORDER — POTASSIUM CHLORIDE 7.45 MG/ML
10 INJECTION INTRAVENOUS ONCE
Status: COMPLETED | OUTPATIENT
Start: 2024-02-08 | End: 2024-02-08

## 2024-02-08 RX ORDER — POTASSIUM CHLORIDE 7.45 MG/ML
10 INJECTION INTRAVENOUS PRN
Status: DISCONTINUED | OUTPATIENT
Start: 2024-02-08 | End: 2024-02-15 | Stop reason: HOSPADM

## 2024-02-08 RX ORDER — ALBUTEROL SULFATE 90 UG/1
2 AEROSOL, METERED RESPIRATORY (INHALATION) 4 TIMES DAILY PRN
Status: DISCONTINUED | OUTPATIENT
Start: 2024-02-08 | End: 2024-02-15 | Stop reason: HOSPADM

## 2024-02-08 RX ORDER — GABAPENTIN 300 MG/1
300 CAPSULE ORAL 3 TIMES DAILY
Status: DISCONTINUED | OUTPATIENT
Start: 2024-02-08 | End: 2024-02-14

## 2024-02-08 RX ORDER — MAGNESIUM SULFATE IN WATER 40 MG/ML
2000 INJECTION, SOLUTION INTRAVENOUS PRN
Status: DISCONTINUED | OUTPATIENT
Start: 2024-02-08 | End: 2024-02-15 | Stop reason: HOSPADM

## 2024-02-08 RX ORDER — BUPRENORPHINE HYDROCHLORIDE AND NALOXONE HYDROCHLORIDE DIHYDRATE 2; .5 MG/1; MG/1
1 TABLET SUBLINGUAL DAILY
Status: DISCONTINUED | OUTPATIENT
Start: 2024-02-09 | End: 2024-02-11

## 2024-02-08 RX ORDER — ONDANSETRON 4 MG/1
4 TABLET, ORALLY DISINTEGRATING ORAL EVERY 8 HOURS PRN
Status: DISCONTINUED | OUTPATIENT
Start: 2024-02-08 | End: 2024-02-09

## 2024-02-08 RX ORDER — MIDAZOLAM HYDROCHLORIDE 1 MG/ML
INJECTION INTRAMUSCULAR; INTRAVENOUS
Status: DISCONTINUED
Start: 2024-02-08 | End: 2024-02-08

## 2024-02-08 RX ORDER — 0.9 % SODIUM CHLORIDE 0.9 %
500 INTRAVENOUS SOLUTION INTRAVENOUS ONCE
Status: COMPLETED | OUTPATIENT
Start: 2024-02-08 | End: 2024-02-09

## 2024-02-08 RX ORDER — MAGNESIUM HYDROXIDE/ALUMINUM HYDROXICE/SIMETHICONE 120; 1200; 1200 MG/30ML; MG/30ML; MG/30ML
30 SUSPENSION ORAL EVERY 6 HOURS PRN
Status: DISCONTINUED | OUTPATIENT
Start: 2024-02-08 | End: 2024-02-15 | Stop reason: HOSPADM

## 2024-02-08 RX ORDER — SODIUM CHLORIDE 9 MG/ML
INJECTION, SOLUTION INTRAVENOUS CONTINUOUS
Status: ACTIVE | OUTPATIENT
Start: 2024-02-08 | End: 2024-02-10

## 2024-02-08 RX ORDER — LORAZEPAM 2 MG/ML
1 INJECTION INTRAMUSCULAR ONCE
Status: COMPLETED | OUTPATIENT
Start: 2024-02-08 | End: 2024-02-08

## 2024-02-08 RX ORDER — 0.9 % SODIUM CHLORIDE 0.9 %
1000 INTRAVENOUS SOLUTION INTRAVENOUS ONCE
Status: COMPLETED | OUTPATIENT
Start: 2024-02-08 | End: 2024-02-08

## 2024-02-08 RX ORDER — ACETAMINOPHEN 325 MG/1
650 TABLET ORAL EVERY 6 HOURS PRN
Status: DISCONTINUED | OUTPATIENT
Start: 2024-02-08 | End: 2024-02-15 | Stop reason: HOSPADM

## 2024-02-08 RX ORDER — ACETAMINOPHEN 650 MG/1
650 SUPPOSITORY RECTAL EVERY 6 HOURS PRN
Status: DISCONTINUED | OUTPATIENT
Start: 2024-02-08 | End: 2024-02-15 | Stop reason: HOSPADM

## 2024-02-08 RX ORDER — MAGNESIUM SULFATE IN WATER 40 MG/ML
2000 INJECTION, SOLUTION INTRAVENOUS ONCE
Status: COMPLETED | OUTPATIENT
Start: 2024-02-08 | End: 2024-02-08

## 2024-02-08 RX ORDER — HYDROXYZINE HYDROCHLORIDE 25 MG/1
25 TABLET, FILM COATED ORAL 3 TIMES DAILY PRN
Status: DISCONTINUED | OUTPATIENT
Start: 2024-02-08 | End: 2024-02-15 | Stop reason: HOSPADM

## 2024-02-08 RX ORDER — SODIUM CHLORIDE 0.9 % (FLUSH) 0.9 %
5-40 SYRINGE (ML) INJECTION PRN
Status: DISCONTINUED | OUTPATIENT
Start: 2024-02-08 | End: 2024-02-11

## 2024-02-08 RX ORDER — ERGOCALCIFEROL 1.25 MG/1
50000 CAPSULE ORAL WEEKLY
Status: DISCONTINUED | OUTPATIENT
Start: 2024-02-10 | End: 2024-02-15 | Stop reason: HOSPADM

## 2024-02-08 RX ORDER — SODIUM CHLORIDE 9 MG/ML
INJECTION, SOLUTION INTRAVENOUS PRN
Status: DISCONTINUED | OUTPATIENT
Start: 2024-02-08 | End: 2024-02-15 | Stop reason: HOSPADM

## 2024-02-08 RX ORDER — SODIUM CHLORIDE 0.9 % (FLUSH) 0.9 %
5-40 SYRINGE (ML) INJECTION EVERY 12 HOURS SCHEDULED
Status: DISCONTINUED | OUTPATIENT
Start: 2024-02-08 | End: 2024-02-11

## 2024-02-08 RX ORDER — ONDANSETRON 2 MG/ML
4 INJECTION INTRAMUSCULAR; INTRAVENOUS EVERY 6 HOURS PRN
Status: DISCONTINUED | OUTPATIENT
Start: 2024-02-08 | End: 2024-02-09

## 2024-02-08 RX ORDER — POLYETHYLENE GLYCOL 3350 17 G/17G
17 POWDER, FOR SOLUTION ORAL DAILY PRN
Status: DISCONTINUED | OUTPATIENT
Start: 2024-02-08 | End: 2024-02-15 | Stop reason: HOSPADM

## 2024-02-08 RX ORDER — MORPHINE SULFATE 2 MG/ML
2 INJECTION, SOLUTION INTRAMUSCULAR; INTRAVENOUS
Status: DISCONTINUED | OUTPATIENT
Start: 2024-02-08 | End: 2024-02-08

## 2024-02-08 RX ORDER — IPRATROPIUM BROMIDE AND ALBUTEROL SULFATE 2.5; .5 MG/3ML; MG/3ML
1 SOLUTION RESPIRATORY (INHALATION)
Status: DISCONTINUED | OUTPATIENT
Start: 2024-02-08 | End: 2024-02-09

## 2024-02-08 RX ORDER — PANTOPRAZOLE SODIUM 40 MG/1
40 TABLET, DELAYED RELEASE ORAL
Status: DISCONTINUED | OUTPATIENT
Start: 2024-02-09 | End: 2024-02-15 | Stop reason: HOSPADM

## 2024-02-08 RX ORDER — FUROSEMIDE 20 MG/1
20 TABLET ORAL 2 TIMES DAILY
Status: DISCONTINUED | OUTPATIENT
Start: 2024-02-08 | End: 2024-02-14

## 2024-02-08 RX ADMIN — AMIODARONE HYDROCHLORIDE 150 MG: 1.5 INJECTION, SOLUTION INTRAVENOUS at 17:10

## 2024-02-08 RX ADMIN — AMIODARONE HYDROCHLORIDE 1 MG/MIN: 50 INJECTION, SOLUTION INTRAVENOUS at 17:11

## 2024-02-08 RX ADMIN — BUPRENORPHINE HYDROCHLORIDE AND NALOXONE HYDROCHLORIDE DIHYDRATE 1 TABLET: 8; 2 TABLET SUBLINGUAL at 22:26

## 2024-02-08 RX ADMIN — POTASSIUM CHLORIDE 40 MEQ: 1500 TABLET, EXTENDED RELEASE ORAL at 23:24

## 2024-02-08 RX ADMIN — SODIUM CHLORIDE 1000 ML: 9 INJECTION, SOLUTION INTRAVENOUS at 17:27

## 2024-02-08 RX ADMIN — GABAPENTIN 300 MG: 300 CAPSULE ORAL at 22:27

## 2024-02-08 RX ADMIN — POTASSIUM CHLORIDE 10 MEQ: 7.46 INJECTION, SOLUTION INTRAVENOUS at 18:23

## 2024-02-08 RX ADMIN — SODIUM CHLORIDE: 9 INJECTION, SOLUTION INTRAVENOUS at 20:47

## 2024-02-08 RX ADMIN — MAGNESIUM SULFATE HEPTAHYDRATE 2000 MG: 40 INJECTION, SOLUTION INTRAVENOUS at 17:06

## 2024-02-08 RX ADMIN — FUROSEMIDE 20 MG: 20 TABLET ORAL at 20:48

## 2024-02-08 RX ADMIN — POTASSIUM CHLORIDE 10 MEQ: 7.46 INJECTION, SOLUTION INTRAVENOUS at 19:46

## 2024-02-08 RX ADMIN — POTASSIUM BICARBONATE 40 MEQ: 391 TABLET, EFFERVESCENT ORAL at 18:45

## 2024-02-08 RX ADMIN — SODIUM CHLORIDE 500 ML: 9 INJECTION, SOLUTION INTRAVENOUS at 22:38

## 2024-02-08 RX ADMIN — AMIODARONE HYDROCHLORIDE 150 MG: 50 INJECTION, SOLUTION INTRAVENOUS at 17:05

## 2024-02-08 RX ADMIN — ASPIRIN 81 MG 81 MG: 81 TABLET ORAL at 20:48

## 2024-02-08 RX ADMIN — Medication 1 MG: at 17:07

## 2024-02-08 NOTE — ED PROVIDER NOTES
[facility]  EMERGENCY DEPARTMENT ENCOUNTER      Pt Name: Lali Cazares  MRN: [id number]  Birthdate 1975  Date of evaluation: 2/8/2024  Provider: MARIE PRIETO DO    CHIEF COMPLAINT       Chief Complaint   Patient presents with    Tachycardia     Pt having multiple runs of v tach, pt states that she \" smoked crack this morning\"  pt is alert and orient upon arrival pt has heart rate of 200's appears to be in v tach, Dr Prieto called to bedside at this time          HISTORY OF PRESENT ILLNESS   (Location/Symptom, Timing/Onset, Context/Setting, Quality, Duration, Modifying Factors, Severity)  Note limiting factors.   Lali Cazares is a 48 y.o. female who presents to the emergency department with a complaint of possible seizure activity.  Paramedics report that they suspected that she may have had some seizure activity but when they put her on the monitor she was noted to be in ventricular tachycardia.  She did not receive any medications and route to the hospital.  She did not receive any cardioversion.    The patient acknowledges that she smoked some cocaine this morning.  She denies any other drug use.  She denies ingestion of any other medications chemicals or substances.    She reports that when this occurs she does experience some chest tightness.  Chest tightness goes away with the arrhythmia.    She denies any shortness of breath or diaphoresis.  She denies any syncope.  She does report palpitations.  She denies any abdominal pain nausea vomiting or diarrhea.  No recent illness.  No fever or chills.  No cough or cold symptoms.    She is not pregnant.    Medical history is significant for asthma, depression, polysubstance abuse, hepatitis C, alcohol abuse.        Nursing Notes were reviewed.    HPI        REVIEW OF SYSTEMS    (2-9 systems for level 4, 10 or more for level 5)       Constitutional: Negative for fever or chills.     HENT: Negative for rhinorrhea and sore

## 2024-02-08 NOTE — ED NOTES
Pt resting in bed at this time, laying in a supine position with head of bed elevated . Call light remains in reach instructed pt how to use, and encouraged pt to call if needed assistance, no distress noted. RR even and unlabored, skin warm and dry. No needs at this time. Will continue to monitor closely.

## 2024-02-08 NOTE — ED NOTES
Pt's significant other at bedside at this time, Pt resting in bed at this time, laying in a supine position with head of bed elevated . Call light remains in reach instructed pt how to use, and encouraged pt to call if needed assistance, no distress noted. RR even and unlabored, skin warm and dry. No needs at this time. Will continue to monitor closely.

## 2024-02-08 NOTE — ED NOTES
Pt arrived tot he ED via [facility], EMS was called for seizures, pt was found to be having multiple runs of vtach  Pt having multiple runs of v tach, pt states that she \" smoked crack this morning\" pt is alert and orient upon arrival pt has heart rate of 200's appears to be in v tach, Dr Dukes called to bedside at this time

## 2024-02-09 PROBLEM — I47.20 VENTRICULAR TACHYCARDIA (HCC): Status: ACTIVE | Noted: 2024-02-08

## 2024-02-09 PROBLEM — R94.31 PROLONGED QT INTERVAL: Status: ACTIVE | Noted: 2024-02-09

## 2024-02-09 PROBLEM — R79.89 ELEVATED TROPONIN: Status: ACTIVE | Noted: 2024-02-09

## 2024-02-09 LAB
ALBUMIN SERPL-MCNC: 3.7 G/DL (ref 3.4–5)
ALBUMIN/GLOB SERPL: 1.9 {RATIO} (ref 1.1–2.2)
ALP SERPL-CCNC: 130 U/L (ref 40–129)
ALT SERPL-CCNC: 40 U/L (ref 10–40)
ANION GAP SERPL CALCULATED.3IONS-SCNC: 12 MMOL/L (ref 3–16)
AST SERPL-CCNC: 54 U/L (ref 15–37)
BILIRUB SERPL-MCNC: 1.9 MG/DL (ref 0–1)
BUN SERPL-MCNC: 7 MG/DL (ref 7–20)
CALCIUM SERPL-MCNC: 7.9 MG/DL (ref 8.3–10.6)
CHLORIDE SERPL-SCNC: 99 MMOL/L (ref 99–110)
CHOLEST SERPL-MCNC: 105 MG/DL (ref 0–199)
CO2 SERPL-SCNC: 31 MMOL/L (ref 21–32)
CREAT SERPL-MCNC: 0.8 MG/DL (ref 0.6–1.1)
DEPRECATED RDW RBC AUTO: 19.8 % (ref 12.4–15.4)
EKG ATRIAL RATE: 202 BPM
EKG ATRIAL RATE: 74 BPM
EKG ATRIAL RATE: 83 BPM
EKG DIAGNOSIS: NORMAL
EKG P AXIS: 23 DEGREES
EKG P-R INTERVAL: 128 MS
EKG P-R INTERVAL: 88 MS
EKG P-R INTERVAL: 94 MS
EKG Q-T INTERVAL: 284 MS
EKG Q-T INTERVAL: 586 MS
EKG Q-T INTERVAL: 650 MS
EKG QRS DURATION: 168 MS
EKG QRS DURATION: 78 MS
EKG QRS DURATION: 84 MS
EKG QTC CALCULATION (BAZETT): 503 MS
EKG QTC CALCULATION (BAZETT): 650 MS
EKG QTC CALCULATION (BAZETT): 768 MS
EKG R AXIS: 123 DEGREES
EKG R AXIS: 78 DEGREES
EKG R AXIS: 78 DEGREES
EKG T AXIS: 270 DEGREES
EKG T AXIS: 90 DEGREES
EKG T AXIS: 97 DEGREES
EKG VENTRICULAR RATE: 189 BPM
EKG VENTRICULAR RATE: 74 BPM
EKG VENTRICULAR RATE: 84 BPM
GFR SERPLBLD CREATININE-BSD FMLA CKD-EPI: >60 ML/MIN/{1.73_M2}
GLUCOSE BLD-MCNC: 159 MG/DL (ref 70–99)
GLUCOSE BLD-MCNC: 79 MG/DL (ref 70–99)
GLUCOSE BLD-MCNC: 79 MG/DL (ref 70–99)
GLUCOSE BLD-MCNC: 84 MG/DL (ref 70–99)
GLUCOSE SERPL-MCNC: 77 MG/DL (ref 70–99)
HCT VFR BLD AUTO: 32.2 % (ref 36–48)
HDLC SERPL-MCNC: 33 MG/DL (ref 40–60)
HGB BLD-MCNC: 10.3 G/DL (ref 12–16)
LDLC SERPL CALC-MCNC: 62 MG/DL
LIPASE SERPL-CCNC: 11 U/L (ref 13–60)
MAGNESIUM SERPL-MCNC: 1.6 MG/DL (ref 1.8–2.4)
MCH RBC QN AUTO: 25.4 PG (ref 26–34)
MCHC RBC AUTO-ENTMCNC: 32 G/DL (ref 31–36)
MCV RBC AUTO: 79.5 FL (ref 80–100)
PERFORMED ON: ABNORMAL
PERFORMED ON: NORMAL
PHOSPHATE SERPL-MCNC: 3.8 MG/DL (ref 2.5–4.9)
PLATELET # BLD AUTO: 121 K/UL (ref 135–450)
PMV BLD AUTO: 8.2 FL (ref 5–10.5)
POTASSIUM SERPL-SCNC: 3 MMOL/L (ref 3.5–5.1)
POTASSIUM SERPL-SCNC: 3.7 MMOL/L (ref 3.5–5.1)
PROT SERPL-MCNC: 5.7 G/DL (ref 6.4–8.2)
RBC # BLD AUTO: 4.05 M/UL (ref 4–5.2)
SODIUM SERPL-SCNC: 142 MMOL/L (ref 136–145)
TRIGL SERPL-MCNC: 50 MG/DL (ref 0–150)
VLDLC SERPL CALC-MCNC: 10 MG/DL
WBC # BLD AUTO: 7.6 K/UL (ref 4–11)

## 2024-02-09 PROCEDURE — 84132 ASSAY OF SERUM POTASSIUM: CPT

## 2024-02-09 PROCEDURE — 93010 ELECTROCARDIOGRAM REPORT: CPT | Performed by: INTERNAL MEDICINE

## 2024-02-09 PROCEDURE — 2580000003 HC RX 258: Performed by: INTERNAL MEDICINE

## 2024-02-09 PROCEDURE — 6360000002 HC RX W HCPCS: Performed by: EMERGENCY MEDICINE

## 2024-02-09 PROCEDURE — 99223 1ST HOSP IP/OBS HIGH 75: CPT | Performed by: INTERNAL MEDICINE

## 2024-02-09 PROCEDURE — 6360000002 HC RX W HCPCS: Performed by: HOSPITALIST

## 2024-02-09 PROCEDURE — 6370000000 HC RX 637 (ALT 250 FOR IP): Performed by: INTERNAL MEDICINE

## 2024-02-09 PROCEDURE — 94640 AIRWAY INHALATION TREATMENT: CPT

## 2024-02-09 PROCEDURE — 2700000000 HC OXYGEN THERAPY PER DAY

## 2024-02-09 PROCEDURE — 36415 COLL VENOUS BLD VENIPUNCTURE: CPT

## 2024-02-09 PROCEDURE — 6360000002 HC RX W HCPCS: Performed by: INTERNAL MEDICINE

## 2024-02-09 PROCEDURE — 83690 ASSAY OF LIPASE: CPT

## 2024-02-09 PROCEDURE — 83735 ASSAY OF MAGNESIUM: CPT

## 2024-02-09 PROCEDURE — 85027 COMPLETE CBC AUTOMATED: CPT

## 2024-02-09 PROCEDURE — 93306 TTE W/DOPPLER COMPLETE: CPT

## 2024-02-09 PROCEDURE — 80053 COMPREHEN METABOLIC PANEL: CPT

## 2024-02-09 PROCEDURE — 2060000000 HC ICU INTERMEDIATE R&B

## 2024-02-09 PROCEDURE — 2580000003 HC RX 258: Performed by: EMERGENCY MEDICINE

## 2024-02-09 PROCEDURE — 80061 LIPID PANEL: CPT

## 2024-02-09 PROCEDURE — 6370000000 HC RX 637 (ALT 250 FOR IP): Performed by: REGISTERED NURSE

## 2024-02-09 PROCEDURE — 94761 N-INVAS EAR/PLS OXIMETRY MLT: CPT

## 2024-02-09 PROCEDURE — 6370000000 HC RX 637 (ALT 250 FOR IP): Performed by: HOSPITALIST

## 2024-02-09 PROCEDURE — 84100 ASSAY OF PHOSPHORUS: CPT

## 2024-02-09 RX ORDER — LORAZEPAM 1 MG/1
1 TABLET ORAL
Status: DISCONTINUED | OUTPATIENT
Start: 2024-02-09 | End: 2024-02-09

## 2024-02-09 RX ORDER — DIAZEPAM 5 MG/ML
10 INJECTION, SOLUTION INTRAMUSCULAR; INTRAVENOUS
Status: DISCONTINUED | OUTPATIENT
Start: 2024-02-09 | End: 2024-02-15 | Stop reason: HOSPADM

## 2024-02-09 RX ORDER — SODIUM CHLORIDE 0.9 % (FLUSH) 0.9 %
5-40 SYRINGE (ML) INJECTION EVERY 12 HOURS SCHEDULED
Status: DISCONTINUED | OUTPATIENT
Start: 2024-02-09 | End: 2024-02-15 | Stop reason: HOSPADM

## 2024-02-09 RX ORDER — DIAZEPAM 5 MG/ML
5 INJECTION, SOLUTION INTRAMUSCULAR; INTRAVENOUS
Status: DISCONTINUED | OUTPATIENT
Start: 2024-02-09 | End: 2024-02-15 | Stop reason: HOSPADM

## 2024-02-09 RX ORDER — AMIODARONE HYDROCHLORIDE 200 MG/1
200 TABLET ORAL 2 TIMES DAILY
Status: DISCONTINUED | OUTPATIENT
Start: 2024-02-09 | End: 2024-02-15 | Stop reason: HOSPADM

## 2024-02-09 RX ORDER — LORAZEPAM 2 MG/ML
1 INJECTION INTRAMUSCULAR
Status: DISCONTINUED | OUTPATIENT
Start: 2024-02-09 | End: 2024-02-09

## 2024-02-09 RX ORDER — LORAZEPAM 2 MG/ML
4 INJECTION INTRAMUSCULAR
Status: DISCONTINUED | OUTPATIENT
Start: 2024-02-09 | End: 2024-02-09

## 2024-02-09 RX ORDER — SODIUM CHLORIDE 0.9 % (FLUSH) 0.9 %
5-40 SYRINGE (ML) INJECTION PRN
Status: DISCONTINUED | OUTPATIENT
Start: 2024-02-09 | End: 2024-02-15 | Stop reason: HOSPADM

## 2024-02-09 RX ORDER — ALPRAZOLAM 0.5 MG/1
0.5 TABLET ORAL ONCE
Status: COMPLETED | OUTPATIENT
Start: 2024-02-09 | End: 2024-02-09

## 2024-02-09 RX ORDER — MAGNESIUM SULFATE IN WATER 40 MG/ML
2000 INJECTION, SOLUTION INTRAVENOUS ONCE
Status: COMPLETED | OUTPATIENT
Start: 2024-02-09 | End: 2024-02-09

## 2024-02-09 RX ORDER — DIAZEPAM 5 MG/ML
15 INJECTION, SOLUTION INTRAMUSCULAR; INTRAVENOUS
Status: DISCONTINUED | OUTPATIENT
Start: 2024-02-09 | End: 2024-02-15 | Stop reason: HOSPADM

## 2024-02-09 RX ORDER — LORAZEPAM 2 MG/ML
3 INJECTION INTRAMUSCULAR
Status: DISCONTINUED | OUTPATIENT
Start: 2024-02-09 | End: 2024-02-09

## 2024-02-09 RX ORDER — SODIUM CHLORIDE 9 MG/ML
INJECTION, SOLUTION INTRAVENOUS PRN
Status: DISCONTINUED | OUTPATIENT
Start: 2024-02-09 | End: 2024-02-15 | Stop reason: HOSPADM

## 2024-02-09 RX ORDER — GAUZE BANDAGE 2" X 2"
100 BANDAGE TOPICAL DAILY
Status: DISCONTINUED | OUTPATIENT
Start: 2024-02-09 | End: 2024-02-15 | Stop reason: HOSPADM

## 2024-02-09 RX ORDER — LORAZEPAM 1 MG/1
2 TABLET ORAL
Status: DISCONTINUED | OUTPATIENT
Start: 2024-02-09 | End: 2024-02-09

## 2024-02-09 RX ORDER — DIAZEPAM 5 MG/1
10 TABLET ORAL
Status: DISCONTINUED | OUTPATIENT
Start: 2024-02-09 | End: 2024-02-15 | Stop reason: HOSPADM

## 2024-02-09 RX ORDER — AMIODARONE HYDROCHLORIDE 200 MG/1
200 TABLET ORAL DAILY
Status: DISCONTINUED | OUTPATIENT
Start: 2024-02-16 | End: 2024-02-15 | Stop reason: HOSPADM

## 2024-02-09 RX ORDER — DIAZEPAM 5 MG/1
20 TABLET ORAL
Status: DISCONTINUED | OUTPATIENT
Start: 2024-02-09 | End: 2024-02-15 | Stop reason: HOSPADM

## 2024-02-09 RX ORDER — DIAZEPAM 5 MG/1
15 TABLET ORAL
Status: DISCONTINUED | OUTPATIENT
Start: 2024-02-09 | End: 2024-02-15 | Stop reason: HOSPADM

## 2024-02-09 RX ORDER — DIAZEPAM 5 MG/1
5 TABLET ORAL
Status: DISCONTINUED | OUTPATIENT
Start: 2024-02-09 | End: 2024-02-15 | Stop reason: HOSPADM

## 2024-02-09 RX ORDER — IPRATROPIUM BROMIDE AND ALBUTEROL SULFATE 2.5; .5 MG/3ML; MG/3ML
1 SOLUTION RESPIRATORY (INHALATION)
Status: DISCONTINUED | OUTPATIENT
Start: 2024-02-09 | End: 2024-02-13

## 2024-02-09 RX ORDER — DIAZEPAM 5 MG/ML
20 INJECTION, SOLUTION INTRAMUSCULAR; INTRAVENOUS
Status: DISCONTINUED | OUTPATIENT
Start: 2024-02-09 | End: 2024-02-15 | Stop reason: HOSPADM

## 2024-02-09 RX ORDER — LORAZEPAM 1 MG/1
4 TABLET ORAL
Status: DISCONTINUED | OUTPATIENT
Start: 2024-02-09 | End: 2024-02-09

## 2024-02-09 RX ORDER — LORAZEPAM 2 MG/ML
2 INJECTION INTRAMUSCULAR
Status: DISCONTINUED | OUTPATIENT
Start: 2024-02-09 | End: 2024-02-09

## 2024-02-09 RX ORDER — LORAZEPAM 1 MG/1
3 TABLET ORAL
Status: DISCONTINUED | OUTPATIENT
Start: 2024-02-09 | End: 2024-02-09

## 2024-02-09 RX ADMIN — GABAPENTIN 300 MG: 300 CAPSULE ORAL at 20:36

## 2024-02-09 RX ADMIN — PANTOPRAZOLE SODIUM 40 MG: 40 TABLET, DELAYED RELEASE ORAL at 05:54

## 2024-02-09 RX ADMIN — MAGNESIUM SULFATE HEPTAHYDRATE 2000 MG: 40 INJECTION, SOLUTION INTRAVENOUS at 05:51

## 2024-02-09 RX ADMIN — POTASSIUM CHLORIDE 10 MEQ: 7.46 INJECTION, SOLUTION INTRAVENOUS at 13:20

## 2024-02-09 RX ADMIN — IPRATROPIUM BROMIDE AND ALBUTEROL SULFATE 1 DOSE: 2.5; .5 SOLUTION RESPIRATORY (INHALATION) at 20:01

## 2024-02-09 RX ADMIN — IPRATROPIUM BROMIDE AND ALBUTEROL SULFATE 1 DOSE: 2.5; .5 SOLUTION RESPIRATORY (INHALATION) at 16:16

## 2024-02-09 RX ADMIN — IPRATROPIUM BROMIDE AND ALBUTEROL SULFATE 1 DOSE: 2.5; .5 SOLUTION RESPIRATORY (INHALATION) at 12:13

## 2024-02-09 RX ADMIN — FUROSEMIDE 20 MG: 20 TABLET ORAL at 09:54

## 2024-02-09 RX ADMIN — DEXTROSE MONOHYDRATE 0.5 MG/MIN: 50 INJECTION, SOLUTION INTRAVENOUS at 00:34

## 2024-02-09 RX ADMIN — BUPRENORPHINE HYDROCHLORIDE AND NALOXONE HYDROCHLORIDE DIHYDRATE 1 TABLET: 8; 2 TABLET SUBLINGUAL at 10:52

## 2024-02-09 RX ADMIN — MOMETASONE FUROATE AND FORMOTEROL FUMARATE DIHYDRATE 2 PUFF: 100; 5 AEROSOL RESPIRATORY (INHALATION) at 08:12

## 2024-02-09 RX ADMIN — ASPIRIN 81 MG 81 MG: 81 TABLET ORAL at 09:54

## 2024-02-09 RX ADMIN — ALPRAZOLAM 0.5 MG: 0.5 TABLET ORAL at 00:33

## 2024-02-09 RX ADMIN — Medication 10 ML: at 20:37

## 2024-02-09 RX ADMIN — ACETAMINOPHEN 650 MG: 325 TABLET ORAL at 05:58

## 2024-02-09 RX ADMIN — BUPRENORPHINE HYDROCHLORIDE AND NALOXONE HYDROCHLORIDE DIHYDRATE 1 TABLET: 8; 2 TABLET SUBLINGUAL at 20:37

## 2024-02-09 RX ADMIN — POTASSIUM CHLORIDE 10 MEQ: 7.46 INJECTION, SOLUTION INTRAVENOUS at 06:51

## 2024-02-09 RX ADMIN — AMIODARONE HYDROCHLORIDE 200 MG: 200 TABLET ORAL at 20:36

## 2024-02-09 RX ADMIN — IPRATROPIUM BROMIDE AND ALBUTEROL SULFATE 1 DOSE: 2.5; .5 SOLUTION RESPIRATORY (INHALATION) at 08:12

## 2024-02-09 RX ADMIN — POTASSIUM CHLORIDE 10 MEQ: 7.46 INJECTION, SOLUTION INTRAVENOUS at 11:42

## 2024-02-09 RX ADMIN — POTASSIUM CHLORIDE 10 MEQ: 7.46 INJECTION, SOLUTION INTRAVENOUS at 08:38

## 2024-02-09 RX ADMIN — MOMETASONE FUROATE AND FORMOTEROL FUMARATE DIHYDRATE 2 PUFF: 100; 5 AEROSOL RESPIRATORY (INHALATION) at 20:01

## 2024-02-09 RX ADMIN — POTASSIUM CHLORIDE 10 MEQ: 7.46 INJECTION, SOLUTION INTRAVENOUS at 09:55

## 2024-02-09 RX ADMIN — SODIUM CHLORIDE: 9 INJECTION, SOLUTION INTRAVENOUS at 03:22

## 2024-02-09 RX ADMIN — SODIUM CHLORIDE: 9 INJECTION, SOLUTION INTRAVENOUS at 16:56

## 2024-02-09 RX ADMIN — GABAPENTIN 300 MG: 300 CAPSULE ORAL at 16:55

## 2024-02-09 RX ADMIN — HYDROXYZINE HYDROCHLORIDE 25 MG: 10 TABLET ORAL at 17:04

## 2024-02-09 RX ADMIN — ACETAMINOPHEN 650 MG: 325 TABLET ORAL at 20:36

## 2024-02-09 RX ADMIN — Medication 10 ML: at 09:58

## 2024-02-09 RX ADMIN — GABAPENTIN 300 MG: 300 CAPSULE ORAL at 09:53

## 2024-02-09 RX ADMIN — FUROSEMIDE 20 MG: 20 TABLET ORAL at 20:36

## 2024-02-09 RX ADMIN — AMIODARONE HYDROCHLORIDE 200 MG: 200 TABLET ORAL at 10:52

## 2024-02-09 RX ADMIN — DIAZEPAM 10 MG: 5 TABLET ORAL at 21:01

## 2024-02-09 RX ADMIN — BUPRENORPHINE HYDROCHLORIDE AND NALOXONE HYDROCHLORIDE DIHYDRATE 1 TABLET: 2; .5 TABLET SUBLINGUAL at 10:52

## 2024-02-09 RX ADMIN — ESCITALOPRAM OXALATE 20 MG: 10 TABLET ORAL at 09:54

## 2024-02-09 RX ADMIN — MAGNESIUM SULFATE HEPTAHYDRATE 2000 MG: 40 INJECTION, SOLUTION INTRAVENOUS at 09:55

## 2024-02-09 RX ADMIN — POTASSIUM CHLORIDE 10 MEQ: 7.46 INJECTION, SOLUTION INTRAVENOUS at 05:48

## 2024-02-09 NOTE — ED NOTES
Pt resting in bed with no signs of acute distress, RR easy and unlabored. Blankets applied, call light in reach, bed in lowest position, updated on plan of care, all questions answered.

## 2024-02-09 NOTE — PLAN OF CARE
Problem: Discharge Planning  Goal: Discharge to home or other facility with appropriate resources  Outcome: Progressing  Flowsheets (Taken 2/8/2024 2143)  Discharge to home or other facility with appropriate resources:   Identify barriers to discharge with patient and caregiver   Arrange for needed discharge resources and transportation as appropriate   Identify discharge learning needs (meds, wound care, etc)     Problem: Safety - Adult  Goal: Free from fall injury  Outcome: Progressing  Flowsheets (Taken 2/9/2024 0353)  Free From Fall Injury: Instruct family/caregiver on patient safety     Problem: ABCDS Injury Assessment  Goal: Absence of physical injury  Outcome: Progressing  Flowsheets (Taken 2/9/2024 0353)  Absence of Physical Injury: Implement safety measures based on patient assessment

## 2024-02-09 NOTE — H&P
overlying artifact to assure stability.         Electronically signed by Yuliet Alarcon MD on 2/8/2024 at 8:01 PM

## 2024-02-10 LAB
ANION GAP SERPL CALCULATED.3IONS-SCNC: 9 MMOL/L (ref 3–16)
BUN SERPL-MCNC: 9 MG/DL (ref 7–20)
CALCIUM SERPL-MCNC: 8.1 MG/DL (ref 8.3–10.6)
CHLORIDE SERPL-SCNC: 103 MMOL/L (ref 99–110)
CO2 SERPL-SCNC: 27 MMOL/L (ref 21–32)
CREAT SERPL-MCNC: 0.7 MG/DL (ref 0.6–1.1)
DEPRECATED RDW RBC AUTO: 19.3 % (ref 12.4–15.4)
GFR SERPLBLD CREATININE-BSD FMLA CKD-EPI: >60 ML/MIN/{1.73_M2}
GLUCOSE SERPL-MCNC: 102 MG/DL (ref 70–99)
HCT VFR BLD AUTO: 29.1 % (ref 36–48)
HGB BLD-MCNC: 9.4 G/DL (ref 12–16)
MAGNESIUM SERPL-MCNC: 1.4 MG/DL (ref 1.8–2.4)
MCH RBC QN AUTO: 25.9 PG (ref 26–34)
MCHC RBC AUTO-ENTMCNC: 32.4 G/DL (ref 31–36)
MCV RBC AUTO: 79.9 FL (ref 80–100)
PLATELET # BLD AUTO: 89 K/UL (ref 135–450)
PLATELET BLD QL SMEAR: ABNORMAL
PMV BLD AUTO: 8.7 FL (ref 5–10.5)
POTASSIUM SERPL-SCNC: 4.2 MMOL/L (ref 3.5–5.1)
RBC # BLD AUTO: 3.64 M/UL (ref 4–5.2)
SLIDE REVIEW: ABNORMAL
SODIUM SERPL-SCNC: 139 MMOL/L (ref 136–145)
WBC # BLD AUTO: 4.7 K/UL (ref 4–11)

## 2024-02-10 PROCEDURE — 36415 COLL VENOUS BLD VENIPUNCTURE: CPT

## 2024-02-10 PROCEDURE — 94640 AIRWAY INHALATION TREATMENT: CPT

## 2024-02-10 PROCEDURE — 6360000002 HC RX W HCPCS: Performed by: HOSPITALIST

## 2024-02-10 PROCEDURE — 6370000000 HC RX 637 (ALT 250 FOR IP): Performed by: INTERNAL MEDICINE

## 2024-02-10 PROCEDURE — 2580000003 HC RX 258: Performed by: NURSE PRACTITIONER

## 2024-02-10 PROCEDURE — 2580000003 HC RX 258: Performed by: INTERNAL MEDICINE

## 2024-02-10 PROCEDURE — 94760 N-INVAS EAR/PLS OXIMETRY 1: CPT

## 2024-02-10 PROCEDURE — 2060000000 HC ICU INTERMEDIATE R&B

## 2024-02-10 PROCEDURE — 6370000000 HC RX 637 (ALT 250 FOR IP): Performed by: NURSE PRACTITIONER

## 2024-02-10 PROCEDURE — 85027 COMPLETE CBC AUTOMATED: CPT

## 2024-02-10 PROCEDURE — 6360000002 HC RX W HCPCS: Performed by: INTERNAL MEDICINE

## 2024-02-10 PROCEDURE — 6370000000 HC RX 637 (ALT 250 FOR IP): Performed by: HOSPITALIST

## 2024-02-10 PROCEDURE — 80048 BASIC METABOLIC PNL TOTAL CA: CPT

## 2024-02-10 PROCEDURE — 83735 ASSAY OF MAGNESIUM: CPT

## 2024-02-10 RX ORDER — MAGNESIUM SULFATE IN WATER 40 MG/ML
4000 INJECTION, SOLUTION INTRAVENOUS ONCE
Status: COMPLETED | OUTPATIENT
Start: 2024-02-10 | End: 2024-02-10

## 2024-02-10 RX ADMIN — SODIUM CHLORIDE, PRESERVATIVE FREE 10 ML: 5 INJECTION INTRAVENOUS at 20:51

## 2024-02-10 RX ADMIN — PANTOPRAZOLE SODIUM 40 MG: 40 TABLET, DELAYED RELEASE ORAL at 06:10

## 2024-02-10 RX ADMIN — DIAZEPAM 10 MG: 5 TABLET ORAL at 21:14

## 2024-02-10 RX ADMIN — TOPICAL ANESTHETIC: 200 SPRAY DENTAL; PERIODONTAL at 21:54

## 2024-02-10 RX ADMIN — GABAPENTIN 300 MG: 300 CAPSULE ORAL at 08:11

## 2024-02-10 RX ADMIN — DIAZEPAM 10 MG: 5 TABLET ORAL at 13:18

## 2024-02-10 RX ADMIN — Medication 100 MG: at 00:05

## 2024-02-10 RX ADMIN — GABAPENTIN 300 MG: 300 CAPSULE ORAL at 14:23

## 2024-02-10 RX ADMIN — GABAPENTIN 300 MG: 300 CAPSULE ORAL at 21:10

## 2024-02-10 RX ADMIN — TOPICAL ANESTHETIC: 200 SPRAY DENTAL; PERIODONTAL at 16:22

## 2024-02-10 RX ADMIN — AMIODARONE HYDROCHLORIDE 200 MG: 200 TABLET ORAL at 08:11

## 2024-02-10 RX ADMIN — BUPRENORPHINE HYDROCHLORIDE AND NALOXONE HYDROCHLORIDE DIHYDRATE 1 TABLET: 8; 2 TABLET SUBLINGUAL at 08:11

## 2024-02-10 RX ADMIN — BUPRENORPHINE HYDROCHLORIDE AND NALOXONE HYDROCHLORIDE DIHYDRATE 1 TABLET: 2; .5 TABLET SUBLINGUAL at 08:11

## 2024-02-10 RX ADMIN — SODIUM CHLORIDE: 9 INJECTION, SOLUTION INTRAVENOUS at 04:50

## 2024-02-10 RX ADMIN — Medication 100 MG: at 08:11

## 2024-02-10 RX ADMIN — TOPICAL ANESTHETIC: 200 SPRAY DENTAL; PERIODONTAL at 10:38

## 2024-02-10 RX ADMIN — FUROSEMIDE 20 MG: 20 TABLET ORAL at 08:11

## 2024-02-10 RX ADMIN — ERGOCALCIFEROL 50000 UNITS: 1.25 CAPSULE ORAL at 08:11

## 2024-02-10 RX ADMIN — MOMETASONE FUROATE AND FORMOTEROL FUMARATE DIHYDRATE 2 PUFF: 100; 5 AEROSOL RESPIRATORY (INHALATION) at 08:03

## 2024-02-10 RX ADMIN — DIAZEPAM 10 MG: 5 TABLET ORAL at 08:11

## 2024-02-10 RX ADMIN — ESCITALOPRAM OXALATE 20 MG: 10 TABLET ORAL at 08:11

## 2024-02-10 RX ADMIN — MAGNESIUM SULFATE HEPTAHYDRATE 4000 MG: 40 INJECTION, SOLUTION INTRAVENOUS at 14:21

## 2024-02-10 RX ADMIN — FUROSEMIDE 20 MG: 20 TABLET ORAL at 21:10

## 2024-02-10 RX ADMIN — BUPRENORPHINE HYDROCHLORIDE AND NALOXONE HYDROCHLORIDE DIHYDRATE 1 TABLET: 8; 2 TABLET SUBLINGUAL at 21:09

## 2024-02-10 RX ADMIN — IPRATROPIUM BROMIDE AND ALBUTEROL SULFATE 1 DOSE: 2.5; .5 SOLUTION RESPIRATORY (INHALATION) at 08:03

## 2024-02-10 RX ADMIN — AMIODARONE HYDROCHLORIDE 200 MG: 200 TABLET ORAL at 21:10

## 2024-02-10 NOTE — FLOWSHEET NOTE
02/09/24 2033   Treatment Team Notification   Reason for Communication Evaluate  (pt reports drinks alcohol , scored 11 on CIWA, hx ETOH withdraw. See NP note,)   Name of Team Member Notified Asha MCCLURE NP   Treatment Team Role Advanced Practice Nurse   Method of Communication Secure Message   Response See orders   Notification Time 2033

## 2024-02-10 NOTE — PLAN OF CARE
Problem: Discharge Planning  Goal: Discharge to home or other facility with appropriate resources  Outcome: Progressing     Problem: Safety - Adult  Goal: Free from fall injury  Outcome: Progressing     Problem: ABCDS Injury Assessment  Goal: Absence of physical injury  Outcome: Progressing     Problem: Pain  Goal: Verbalizes/displays adequate comfort level or baseline comfort level  Outcome: Progressing  Flowsheets (Taken 2/9/2024 2001)  Verbalizes/displays adequate comfort level or baseline comfort level: Encourage patient to monitor pain and request assistance

## 2024-02-11 LAB
ANION GAP SERPL CALCULATED.3IONS-SCNC: 6 MMOL/L (ref 3–16)
BUN SERPL-MCNC: 8 MG/DL (ref 7–20)
CALCIUM SERPL-MCNC: 8.3 MG/DL (ref 8.3–10.6)
CHLORIDE SERPL-SCNC: 102 MMOL/L (ref 99–110)
CO2 SERPL-SCNC: 30 MMOL/L (ref 21–32)
CREAT SERPL-MCNC: 0.8 MG/DL (ref 0.6–1.1)
DEPRECATED RDW RBC AUTO: 19.9 % (ref 12.4–15.4)
GFR SERPLBLD CREATININE-BSD FMLA CKD-EPI: >60 ML/MIN/{1.73_M2}
GLUCOSE BLD-MCNC: 112 MG/DL (ref 70–99)
GLUCOSE SERPL-MCNC: 92 MG/DL (ref 70–99)
HCT VFR BLD AUTO: 29.5 % (ref 36–48)
MAGNESIUM SERPL-MCNC: 1.8 MG/DL (ref 1.8–2.4)
MCH RBC QN AUTO: 25.8 PG (ref 26–34)
MCV RBC AUTO: 80.7 FL (ref 80–100)
PERFORMED ON: ABNORMAL
PLATELET # BLD AUTO: 63 K/UL (ref 135–450)
PMV BLD AUTO: 8.4 FL (ref 5–10.5)
POTASSIUM SERPL-SCNC: 3.6 MMOL/L (ref 3.5–5.1)
RBC # BLD AUTO: 3.65 M/UL (ref 4–5.2)
SODIUM SERPL-SCNC: 138 MMOL/L (ref 136–145)
WBC # BLD AUTO: 3 K/UL (ref 4–11)

## 2024-02-11 PROCEDURE — 6360000002 HC RX W HCPCS: Performed by: HOSPITALIST

## 2024-02-11 PROCEDURE — 6370000000 HC RX 637 (ALT 250 FOR IP): Performed by: HOSPITALIST

## 2024-02-11 PROCEDURE — 85027 COMPLETE CBC AUTOMATED: CPT

## 2024-02-11 PROCEDURE — 6370000000 HC RX 637 (ALT 250 FOR IP): Performed by: NURSE PRACTITIONER

## 2024-02-11 PROCEDURE — 2060000000 HC ICU INTERMEDIATE R&B

## 2024-02-11 PROCEDURE — 6370000000 HC RX 637 (ALT 250 FOR IP): Performed by: INTERNAL MEDICINE

## 2024-02-11 PROCEDURE — 6360000002 HC RX W HCPCS: Performed by: INTERNAL MEDICINE

## 2024-02-11 PROCEDURE — 80048 BASIC METABOLIC PNL TOTAL CA: CPT

## 2024-02-11 PROCEDURE — 94640 AIRWAY INHALATION TREATMENT: CPT

## 2024-02-11 PROCEDURE — 2580000003 HC RX 258: Performed by: INTERNAL MEDICINE

## 2024-02-11 PROCEDURE — 36415 COLL VENOUS BLD VENIPUNCTURE: CPT

## 2024-02-11 PROCEDURE — 94760 N-INVAS EAR/PLS OXIMETRY 1: CPT

## 2024-02-11 PROCEDURE — 83735 ASSAY OF MAGNESIUM: CPT

## 2024-02-11 PROCEDURE — 2580000003 HC RX 258: Performed by: NURSE PRACTITIONER

## 2024-02-11 RX ORDER — CHLORDIAZEPOXIDE HYDROCHLORIDE 5 MG/1
10 CAPSULE, GELATIN COATED ORAL 3 TIMES DAILY
Status: DISCONTINUED | OUTPATIENT
Start: 2024-02-11 | End: 2024-02-12

## 2024-02-11 RX ORDER — VALACYCLOVIR HYDROCHLORIDE 500 MG/1
500 TABLET, FILM COATED ORAL DAILY
Status: DISCONTINUED | OUTPATIENT
Start: 2024-02-11 | End: 2024-02-15 | Stop reason: HOSPADM

## 2024-02-11 RX ORDER — PROCHLORPERAZINE EDISYLATE 5 MG/ML
10 INJECTION INTRAMUSCULAR; INTRAVENOUS EVERY 6 HOURS PRN
Status: DISCONTINUED | OUTPATIENT
Start: 2024-02-11 | End: 2024-02-15 | Stop reason: HOSPADM

## 2024-02-11 RX ORDER — MAGNESIUM SULFATE IN WATER 40 MG/ML
2000 INJECTION, SOLUTION INTRAVENOUS ONCE
Status: COMPLETED | OUTPATIENT
Start: 2024-02-11 | End: 2024-02-11

## 2024-02-11 RX ORDER — ONDANSETRON 2 MG/ML
4 INJECTION INTRAMUSCULAR; INTRAVENOUS EVERY 4 HOURS PRN
Status: DISCONTINUED | OUTPATIENT
Start: 2024-02-11 | End: 2024-02-11

## 2024-02-11 RX ORDER — PAROXETINE HYDROCHLORIDE 20 MG/1
20 TABLET, FILM COATED ORAL NIGHTLY
Status: DISCONTINUED | OUTPATIENT
Start: 2024-02-11 | End: 2024-02-15 | Stop reason: HOSPADM

## 2024-02-11 RX ORDER — POTASSIUM CHLORIDE 750 MG/1
40 TABLET, FILM COATED, EXTENDED RELEASE ORAL ONCE
Status: COMPLETED | OUTPATIENT
Start: 2024-02-11 | End: 2024-02-11

## 2024-02-11 RX ADMIN — TOPICAL ANESTHETIC: 200 SPRAY DENTAL; PERIODONTAL at 04:28

## 2024-02-11 RX ADMIN — PAROXETINE HYDROCHLORIDE 20 MG: 20 TABLET, FILM COATED ORAL at 20:10

## 2024-02-11 RX ADMIN — PANTOPRAZOLE SODIUM 40 MG: 40 TABLET, DELAYED RELEASE ORAL at 05:28

## 2024-02-11 RX ADMIN — AMIODARONE HYDROCHLORIDE 200 MG: 200 TABLET ORAL at 20:10

## 2024-02-11 RX ADMIN — ESCITALOPRAM OXALATE 20 MG: 10 TABLET ORAL at 09:07

## 2024-02-11 RX ADMIN — TOPICAL ANESTHETIC: 200 SPRAY DENTAL; PERIODONTAL at 18:07

## 2024-02-11 RX ADMIN — Medication 100 MG: at 09:06

## 2024-02-11 RX ADMIN — IPRATROPIUM BROMIDE AND ALBUTEROL SULFATE 1 DOSE: 2.5; .5 SOLUTION RESPIRATORY (INHALATION) at 20:15

## 2024-02-11 RX ADMIN — DIAZEPAM 5 MG: 5 TABLET ORAL at 09:17

## 2024-02-11 RX ADMIN — Medication 10 ML: at 09:07

## 2024-02-11 RX ADMIN — VALACYCLOVIR HYDROCHLORIDE 500 MG: 500 TABLET, FILM COATED ORAL at 13:26

## 2024-02-11 RX ADMIN — DIAZEPAM 5 MG: 5 INJECTION, SOLUTION INTRAMUSCULAR; INTRAVENOUS at 05:53

## 2024-02-11 RX ADMIN — SODIUM CHLORIDE, PRESERVATIVE FREE 10 ML: 5 INJECTION INTRAVENOUS at 20:13

## 2024-02-11 RX ADMIN — GABAPENTIN 300 MG: 300 CAPSULE ORAL at 14:56

## 2024-02-11 RX ADMIN — Medication 10 ML: at 10:16

## 2024-02-11 RX ADMIN — FUROSEMIDE 20 MG: 20 TABLET ORAL at 20:10

## 2024-02-11 RX ADMIN — ACETAMINOPHEN 650 MG: 325 TABLET ORAL at 18:11

## 2024-02-11 RX ADMIN — Medication 10 ML: at 13:24

## 2024-02-11 RX ADMIN — ACETAMINOPHEN 650 MG: 325 TABLET ORAL at 09:17

## 2024-02-11 RX ADMIN — SODIUM CHLORIDE, PRESERVATIVE FREE 10 ML: 5 INJECTION INTRAVENOUS at 09:06

## 2024-02-11 RX ADMIN — AMIODARONE HYDROCHLORIDE 200 MG: 200 TABLET ORAL at 09:07

## 2024-02-11 RX ADMIN — MAGNESIUM SULFATE HEPTAHYDRATE 2000 MG: 40 INJECTION, SOLUTION INTRAVENOUS at 13:28

## 2024-02-11 RX ADMIN — SODIUM CHLORIDE: 9 INJECTION, SOLUTION INTRAVENOUS at 13:27

## 2024-02-11 RX ADMIN — FUROSEMIDE 20 MG: 20 TABLET ORAL at 09:06

## 2024-02-11 RX ADMIN — BUPRENORPHINE HYDROCHLORIDE AND NALOXONE HYDROCHLORIDE DIHYDRATE 1 TABLET: 2; .5 TABLET SUBLINGUAL at 09:06

## 2024-02-11 RX ADMIN — IPRATROPIUM BROMIDE AND ALBUTEROL SULFATE 1 DOSE: 2.5; .5 SOLUTION RESPIRATORY (INHALATION) at 09:07

## 2024-02-11 RX ADMIN — DIAZEPAM 10 MG: 5 TABLET ORAL at 04:53

## 2024-02-11 RX ADMIN — CHLORDIAZEPOXIDE HYDROCHLORIDE 10 MG: 5 CAPSULE ORAL at 13:23

## 2024-02-11 RX ADMIN — ONDANSETRON 4 MG: 2 INJECTION INTRAMUSCULAR; INTRAVENOUS at 10:16

## 2024-02-11 RX ADMIN — DIAZEPAM 5 MG: 5 TABLET ORAL at 18:11

## 2024-02-11 RX ADMIN — DIAZEPAM 5 MG: 5 TABLET ORAL at 13:41

## 2024-02-11 RX ADMIN — GABAPENTIN 300 MG: 300 CAPSULE ORAL at 20:10

## 2024-02-11 RX ADMIN — GABAPENTIN 300 MG: 300 CAPSULE ORAL at 09:06

## 2024-02-11 RX ADMIN — TOPICAL ANESTHETIC: 200 SPRAY DENTAL; PERIODONTAL at 13:24

## 2024-02-11 RX ADMIN — POTASSIUM CHLORIDE 40 MEQ: 750 TABLET, FILM COATED, EXTENDED RELEASE ORAL at 13:23

## 2024-02-11 RX ADMIN — CHLORDIAZEPOXIDE HYDROCHLORIDE 10 MG: 5 CAPSULE ORAL at 20:10

## 2024-02-11 RX ADMIN — BUPRENORPHINE HYDROCHLORIDE AND NALOXONE HYDROCHLORIDE DIHYDRATE 1 TABLET: 8; 2 TABLET SUBLINGUAL at 09:07

## 2024-02-11 NOTE — PLAN OF CARE
Problem: Discharge Planning  Goal: Discharge to home or other facility with appropriate resources  2/11/2024 1409 by Naya Moore RN  Outcome: Progressing     Problem: Safety - Adult  Goal: Free from fall injury  2/11/2024 1409 by Naya Moore RN  Outcome: Progressing     Problem: ABCDS Injury Assessment  Goal: Absence of physical injury  2/11/2024 1409 by Naya Moore RN  Outcome: Progressing     Problem: Pain  Goal: Verbalizes/displays adequate comfort level or baseline comfort level  2/11/2024 1409 by Naya Moore RN  Outcome: Progressing     Problem: Skin/Tissue Integrity  Goal: Absence of new skin breakdown  Description: 1.  Monitor for areas of redness and/or skin breakdown  2.  Assess vascular access sites hourly  3.  Every 4-6 hours minimum:  Change oxygen saturation probe site  4.  Every 4-6 hours:  If on nasal continuous positive airway pressure, respiratory therapy assess nares and determine need for appliance change or resting period.  2/11/2024 1409 by Naya Moore RN  Outcome: Progressing

## 2024-02-11 NOTE — PLAN OF CARE
Problem: Discharge Planning  Goal: Discharge to home or other facility with appropriate resources  Outcome: Progressing     Problem: Safety - Adult  Goal: Free from fall injury  Outcome: Progressing     Problem: ABCDS Injury Assessment  Goal: Absence of physical injury  Outcome: Progressing     Problem: Pain  Goal: Verbalizes/displays adequate comfort level or baseline comfort level  Outcome: Progressing  Flowsheets (Taken 2/10/2024 1947)  Verbalizes/displays adequate comfort level or baseline comfort level: Encourage patient to monitor pain and request assistance     Problem: Skin/Tissue Integrity  Goal: Absence of new skin breakdown  Description: 1.  Monitor for areas of redness and/or skin breakdown  2.  Assess vascular access sites hourly  3.  Every 4-6 hours minimum:  Change oxygen saturation probe site  4.  Every 4-6 hours:  If on nasal continuous positive airway pressure, respiratory therapy assess nares and determine need for appliance change or resting period.  Outcome: Progressing

## 2024-02-12 LAB
ANION GAP SERPL CALCULATED.3IONS-SCNC: 6 MMOL/L (ref 3–16)
BUN SERPL-MCNC: 10 MG/DL (ref 7–20)
CALCIUM SERPL-MCNC: 8.7 MG/DL (ref 8.3–10.6)
CHLORIDE SERPL-SCNC: 102 MMOL/L (ref 99–110)
CO2 SERPL-SCNC: 33 MMOL/L (ref 21–32)
CREAT SERPL-MCNC: 0.7 MG/DL (ref 0.6–1.1)
DEPRECATED RDW RBC AUTO: 19.8 % (ref 12.4–15.4)
FERRITIN SERPL IA-MCNC: 18.7 NG/ML (ref 15–150)
FOLATE SERPL-MCNC: 5.39 NG/ML (ref 4.78–24.2)
GFR SERPLBLD CREATININE-BSD FMLA CKD-EPI: >60 ML/MIN/{1.73_M2}
GLUCOSE SERPL-MCNC: 85 MG/DL (ref 70–99)
HAPTOGLOB SERPL-MCNC: 37 MG/DL (ref 30–200)
HCT VFR BLD AUTO: 28.7 % (ref 36–48)
HGB BLD-MCNC: 9.1 G/DL (ref 12–16)
IRON SATN MFR SERPL: 6 % (ref 15–50)
IRON SERPL-MCNC: 20 UG/DL (ref 37–145)
LDH SERPL L TO P-CCNC: 220 U/L (ref 100–190)
MAGNESIUM SERPL-MCNC: 1.6 MG/DL (ref 1.8–2.4)
MCH RBC QN AUTO: 25.8 PG (ref 26–34)
MCHC RBC AUTO-ENTMCNC: 31.6 G/DL (ref 31–36)
MCV RBC AUTO: 81.5 FL (ref 80–100)
PLATELET # BLD AUTO: 56 K/UL (ref 135–450)
PLATELET BLD QL SMEAR: ABNORMAL
PMV BLD AUTO: 8.4 FL (ref 5–10.5)
POTASSIUM SERPL-SCNC: 3.8 MMOL/L (ref 3.5–5.1)
RBC # BLD AUTO: 3.53 M/UL (ref 4–5.2)
SLIDE REVIEW: ABNORMAL
SODIUM SERPL-SCNC: 141 MMOL/L (ref 136–145)
TIBC SERPL-MCNC: 358 UG/DL (ref 260–445)
VIT B12 SERPL-MCNC: 681 PG/ML (ref 211–911)
WBC # BLD AUTO: 2.4 K/UL (ref 4–11)

## 2024-02-12 PROCEDURE — 82607 VITAMIN B-12: CPT

## 2024-02-12 PROCEDURE — 6370000000 HC RX 637 (ALT 250 FOR IP): Performed by: NURSE PRACTITIONER

## 2024-02-12 PROCEDURE — 6370000000 HC RX 637 (ALT 250 FOR IP): Performed by: INTERNAL MEDICINE

## 2024-02-12 PROCEDURE — 84165 PROTEIN E-PHORESIS SERUM: CPT

## 2024-02-12 PROCEDURE — 6370000000 HC RX 637 (ALT 250 FOR IP): Performed by: HOSPITALIST

## 2024-02-12 PROCEDURE — 94760 N-INVAS EAR/PLS OXIMETRY 1: CPT

## 2024-02-12 PROCEDURE — 83010 ASSAY OF HAPTOGLOBIN QUANT: CPT

## 2024-02-12 PROCEDURE — 94640 AIRWAY INHALATION TREATMENT: CPT

## 2024-02-12 PROCEDURE — 83615 LACTATE (LD) (LDH) ENZYME: CPT

## 2024-02-12 PROCEDURE — 6360000002 HC RX W HCPCS: Performed by: HOSPITALIST

## 2024-02-12 PROCEDURE — 2580000003 HC RX 258: Performed by: NURSE PRACTITIONER

## 2024-02-12 PROCEDURE — 82746 ASSAY OF FOLIC ACID SERUM: CPT

## 2024-02-12 PROCEDURE — 85027 COMPLETE CBC AUTOMATED: CPT

## 2024-02-12 PROCEDURE — 36415 COLL VENOUS BLD VENIPUNCTURE: CPT

## 2024-02-12 PROCEDURE — 6360000002 HC RX W HCPCS: Performed by: INTERNAL MEDICINE

## 2024-02-12 PROCEDURE — 83735 ASSAY OF MAGNESIUM: CPT

## 2024-02-12 PROCEDURE — 80048 BASIC METABOLIC PNL TOTAL CA: CPT

## 2024-02-12 PROCEDURE — 83540 ASSAY OF IRON: CPT

## 2024-02-12 PROCEDURE — 2060000000 HC ICU INTERMEDIATE R&B

## 2024-02-12 PROCEDURE — 82728 ASSAY OF FERRITIN: CPT

## 2024-02-12 PROCEDURE — 83550 IRON BINDING TEST: CPT

## 2024-02-12 PROCEDURE — 83883 ASSAY NEPHELOMETRY NOT SPEC: CPT

## 2024-02-12 PROCEDURE — 84155 ASSAY OF PROTEIN SERUM: CPT

## 2024-02-12 RX ORDER — CHLORDIAZEPOXIDE HYDROCHLORIDE 25 MG/1
25 CAPSULE, GELATIN COATED ORAL 3 TIMES DAILY
Status: DISCONTINUED | OUTPATIENT
Start: 2024-02-12 | End: 2024-02-13

## 2024-02-12 RX ADMIN — PANTOPRAZOLE SODIUM 40 MG: 40 TABLET, DELAYED RELEASE ORAL at 05:17

## 2024-02-12 RX ADMIN — SODIUM CHLORIDE, PRESERVATIVE FREE 10 ML: 5 INJECTION INTRAVENOUS at 10:58

## 2024-02-12 RX ADMIN — SODIUM CHLORIDE, PRESERVATIVE FREE 10 ML: 5 INJECTION INTRAVENOUS at 20:48

## 2024-02-12 RX ADMIN — TOPICAL ANESTHETIC: 200 SPRAY DENTAL; PERIODONTAL at 05:17

## 2024-02-12 RX ADMIN — DIAZEPAM 5 MG: 5 TABLET ORAL at 00:16

## 2024-02-12 RX ADMIN — AMIODARONE HYDROCHLORIDE 200 MG: 200 TABLET ORAL at 20:49

## 2024-02-12 RX ADMIN — PROCHLORPERAZINE EDISYLATE 10 MG: 5 INJECTION INTRAMUSCULAR; INTRAVENOUS at 18:22

## 2024-02-12 RX ADMIN — IPRATROPIUM BROMIDE AND ALBUTEROL SULFATE 1 DOSE: 2.5; .5 SOLUTION RESPIRATORY (INHALATION) at 08:29

## 2024-02-12 RX ADMIN — GABAPENTIN 300 MG: 300 CAPSULE ORAL at 10:46

## 2024-02-12 RX ADMIN — TOPICAL ANESTHETIC: 200 SPRAY DENTAL; PERIODONTAL at 00:13

## 2024-02-12 RX ADMIN — GABAPENTIN 300 MG: 300 CAPSULE ORAL at 15:49

## 2024-02-12 RX ADMIN — PAROXETINE HYDROCHLORIDE 20 MG: 20 TABLET, FILM COATED ORAL at 20:48

## 2024-02-12 RX ADMIN — CHLORDIAZEPOXIDE HYDROCHLORIDE 10 MG: 5 CAPSULE ORAL at 10:46

## 2024-02-12 RX ADMIN — DIAZEPAM 5 MG: 5 TABLET ORAL at 05:20

## 2024-02-12 RX ADMIN — MOMETASONE FUROATE AND FORMOTEROL FUMARATE DIHYDRATE 2 PUFF: 100; 5 AEROSOL RESPIRATORY (INHALATION) at 20:23

## 2024-02-12 RX ADMIN — AMIODARONE HYDROCHLORIDE 200 MG: 200 TABLET ORAL at 10:46

## 2024-02-12 RX ADMIN — CHLORDIAZEPOXIDE HYDROCHLORIDE 25 MG: 25 CAPSULE ORAL at 14:35

## 2024-02-12 RX ADMIN — VALACYCLOVIR HYDROCHLORIDE 500 MG: 500 TABLET, FILM COATED ORAL at 10:47

## 2024-02-12 RX ADMIN — CHLORDIAZEPOXIDE HYDROCHLORIDE 25 MG: 25 CAPSULE ORAL at 20:48

## 2024-02-12 RX ADMIN — Medication 100 MG: at 10:46

## 2024-02-12 RX ADMIN — FUROSEMIDE 20 MG: 20 TABLET ORAL at 20:48

## 2024-02-12 RX ADMIN — DIAZEPAM 5 MG: 5 TABLET ORAL at 10:47

## 2024-02-12 RX ADMIN — MAGNESIUM SULFATE HEPTAHYDRATE 2000 MG: 40 INJECTION, SOLUTION INTRAVENOUS at 12:53

## 2024-02-12 RX ADMIN — FUROSEMIDE 20 MG: 20 TABLET ORAL at 10:47

## 2024-02-12 RX ADMIN — TOPICAL ANESTHETIC: 200 SPRAY DENTAL; PERIODONTAL at 10:53

## 2024-02-12 RX ADMIN — GABAPENTIN 300 MG: 300 CAPSULE ORAL at 20:48

## 2024-02-12 RX ADMIN — TOPICAL ANESTHETIC: 200 SPRAY DENTAL; PERIODONTAL at 18:21

## 2024-02-12 NOTE — FLOWSHEET NOTE
02/11/24 1859   Treatment Team Notification   Reason for Communication   (PER CARDIOLOGY NOTE FROM 02/09/2024: RECOMMEND CHANGING LEXAPRO TO PAXIL D/T PROLONGED QT. ALSO STATES SUBOXONE CAN CONTRIBUTE. PATIENT STILL RECEIVING LEXAPRO AND SUBOXONE. REQUESTING CLARIFICATION.)   Name of Team Member Notified DR. ABRAHAN VELÁZQUEZ   Treatment Team Role Attending Provider   Method of Communication Secure Message   Response Waiting for response   Notification Time 1900     20/11/2024 @ 0702: Dr. Velázquez responded via [employer] and advised he will make changes to medications.

## 2024-02-12 NOTE — CONSULTS
MD JOSÉ MIGUEL   20 mg at 02/12/24 1047    gabapentin (NEURONTIN) capsule 300 mg  300 mg Oral TID Yuliet Alarcon MD   300 mg at 02/12/24 1046    hydrOXYzine HCl (ATARAX) tablet 25 mg  25 mg Oral TID PRN Yuliet Alarcon MD   25 mg at 02/09/24 1704    mometasone-formoterol (DULERA) 100-5 MCG/ACT inhaler 2 puff  2 puff Inhalation BID RT Yuliet Alarcon MD   2 puff at 02/10/24 0803    pantoprazole (PROTONIX) tablet 40 mg  40 mg Oral QAM AC Yuliet Alarcon MD   40 mg at 02/12/24 0517    vitamin D (ERGOCALCIFEROL) capsule 50,000 Units  50,000 Units Oral Weekly Yuliet Alarcon MD   50,000 Units at 02/10/24 0811       Allergies:  Allergies   Allergen Reactions    Bactrim [Sulfamethoxazole-Trimethoprim]     Flagyl [Metronidazole] Nausea And Vomiting, Nausea Only and Anxiety       Social History:  Social History     Socioeconomic History    Marital status:      Spouse name: Not on file    Number of children: Not on file    Years of education: Not on file    Highest education level: Not on file   Occupational History    Not on file   Tobacco Use    Smoking status: Every Day     Current packs/day: 0.50     Types: Cigarettes    Smokeless tobacco: Never   Vaping Use    Vaping Use: Never used   Substance and Sexual Activity    Alcohol use: Yes     Alcohol/week: 0.8 standard drinks of alcohol     Types: 1 Standard drinks or equivalent per week     Comment: social    Drug use: Yes     Types: IV, Marijuana (Weed)     Comment: heroin    Sexual activity: Yes     Partners: Male     Comment: pt states gram/day.   Other Topics Concern    Not on file   Social History Narrative    Not on file     Social Determinants of Health     Financial Resource Strain: Not on file   Food Insecurity: Food Insecurity Present (2/8/2024)    Hunger Vital Sign     Worried About Running Out of Food in the Last Year: Never true     Ran Out of Food in the Last Year: Sometimes true   Transportation Needs: No Transportation Needs 
prolongation from cocaine and Suboxone.  No indication for ICD.  Will obtain an echocardiogram.  Encouraged abstinence from cocaine.  Currently on IV amiodarone and will transition to oral therapy as unfortunately she is likely to reuse cocaine again and have a similar outcome.  Emphasized that continued use could result in death.    2) Prolonged QT interval.  Likely multifactorial.  Encouraged abstinence from cocaine.  Will defer to primary team if patient's other prolonged QT medications such as Suboxone and Lexapro should be discontinued.    3) Polysubstance abuse.  Encouraged abstinence from cocaine, heroin, and alcohol.  Also encouraged smoking cessation.    4) Elevated troponin.  History is not consistent with acute coronary syndrome.  Likely elevated related to cocaine use.  No plans for stress testing or angiography.    5) Cirrhosis with varices and HCV.  Will defer management to primary team.    6) Microcytic anemia and thrombocytopenia.  Will defer workup and treatment to primary team but with recent EGD showing antral ulcer.  Will discontinue aspirin.    7) Hypokalemia/hypomagnesemia.  Will defer electrolyte replacement to primary team but keep K >4 and magnesium >2.    Overall, the problems requiring hospitalization are high in severity. Will sign off. Call with questions.     Thank you for allowing us to participate in the care of Lali Cazares.    Zander Quinn MD  [facility] [place]  2/9/2024 8:31 AM

## 2024-02-13 LAB
KAPPA LC FREE SER-MCNC: 33.35 MG/L (ref 3.3–19.4)
KAPPA LC FREE/LAMBDA FREE SER: 1.28 {RATIO} (ref 0.26–1.65)
LAMBDA LC FREE SERPL-MCNC: 26.11 MG/L (ref 5.71–26.3)
MAGNESIUM SERPL-MCNC: 1.7 MG/DL (ref 1.8–2.4)
RPT COMMENT: ABNORMAL

## 2024-02-13 PROCEDURE — 94640 AIRWAY INHALATION TREATMENT: CPT

## 2024-02-13 PROCEDURE — 6370000000 HC RX 637 (ALT 250 FOR IP): Performed by: HOSPITALIST

## 2024-02-13 PROCEDURE — 36415 COLL VENOUS BLD VENIPUNCTURE: CPT

## 2024-02-13 PROCEDURE — 6370000000 HC RX 637 (ALT 250 FOR IP): Performed by: INTERNAL MEDICINE

## 2024-02-13 PROCEDURE — 94760 N-INVAS EAR/PLS OXIMETRY 1: CPT

## 2024-02-13 PROCEDURE — 6360000002 HC RX W HCPCS: Performed by: HOSPITALIST

## 2024-02-13 PROCEDURE — 2580000003 HC RX 258: Performed by: NURSE PRACTITIONER

## 2024-02-13 PROCEDURE — 6370000000 HC RX 637 (ALT 250 FOR IP): Performed by: NURSE PRACTITIONER

## 2024-02-13 PROCEDURE — 83735 ASSAY OF MAGNESIUM: CPT

## 2024-02-13 PROCEDURE — 2060000000 HC ICU INTERMEDIATE R&B

## 2024-02-13 PROCEDURE — 6360000002 HC RX W HCPCS: Performed by: INTERNAL MEDICINE

## 2024-02-13 RX ADMIN — VALACYCLOVIR HYDROCHLORIDE 500 MG: 500 TABLET, FILM COATED ORAL at 09:27

## 2024-02-13 RX ADMIN — IRON SUCROSE 200 MG: 20 INJECTION, SOLUTION INTRAVENOUS at 09:29

## 2024-02-13 RX ADMIN — PAROXETINE HYDROCHLORIDE 20 MG: 20 TABLET, FILM COATED ORAL at 20:18

## 2024-02-13 RX ADMIN — AMIODARONE HYDROCHLORIDE 200 MG: 200 TABLET ORAL at 09:27

## 2024-02-13 RX ADMIN — Medication 100 MG: at 09:27

## 2024-02-13 RX ADMIN — FUROSEMIDE 20 MG: 20 TABLET ORAL at 20:18

## 2024-02-13 RX ADMIN — GABAPENTIN 300 MG: 300 CAPSULE ORAL at 09:27

## 2024-02-13 RX ADMIN — CHLORDIAZEPOXIDE HYDROCHLORIDE 25 MG: 25 CAPSULE ORAL at 09:27

## 2024-02-13 RX ADMIN — FUROSEMIDE 20 MG: 20 TABLET ORAL at 09:27

## 2024-02-13 RX ADMIN — SODIUM CHLORIDE, PRESERVATIVE FREE 10 ML: 5 INJECTION INTRAVENOUS at 20:18

## 2024-02-13 RX ADMIN — IPRATROPIUM BROMIDE AND ALBUTEROL SULFATE 1 DOSE: 2.5; .5 SOLUTION RESPIRATORY (INHALATION) at 09:40

## 2024-02-13 RX ADMIN — ACETAMINOPHEN 650 MG: 325 TABLET ORAL at 03:59

## 2024-02-13 RX ADMIN — TOPICAL ANESTHETIC: 200 SPRAY DENTAL; PERIODONTAL at 10:14

## 2024-02-13 RX ADMIN — PROCHLORPERAZINE EDISYLATE 10 MG: 5 INJECTION INTRAMUSCULAR; INTRAVENOUS at 03:59

## 2024-02-13 RX ADMIN — GABAPENTIN 300 MG: 300 CAPSULE ORAL at 20:18

## 2024-02-13 RX ADMIN — AMIODARONE HYDROCHLORIDE 200 MG: 200 TABLET ORAL at 20:18

## 2024-02-13 RX ADMIN — PANTOPRAZOLE SODIUM 40 MG: 40 TABLET, DELAYED RELEASE ORAL at 06:48

## 2024-02-13 RX ADMIN — SODIUM CHLORIDE, PRESERVATIVE FREE 10 ML: 5 INJECTION INTRAVENOUS at 09:29

## 2024-02-13 RX ADMIN — TOPICAL ANESTHETIC: 200 SPRAY DENTAL; PERIODONTAL at 16:17

## 2024-02-13 RX ADMIN — TOPICAL ANESTHETIC: 200 SPRAY DENTAL; PERIODONTAL at 03:51

## 2024-02-13 RX ADMIN — GABAPENTIN 300 MG: 300 CAPSULE ORAL at 15:24

## 2024-02-14 LAB
ALBUMIN SERPL ELPH-MCNC: 2.7 G/DL (ref 3.1–4.9)
ALPHA1 GLOB SERPL ELPH-MCNC: 0.3 G/DL (ref 0.2–0.4)
ALPHA2 GLOB SERPL ELPH-MCNC: 0.5 G/DL (ref 0.4–1.1)
ANION GAP SERPL CALCULATED.3IONS-SCNC: 9 MMOL/L (ref 3–16)
B-GLOBULIN SERPL ELPH-MCNC: 1 G/DL (ref 0.9–1.6)
BASOPHILS # BLD: 0 K/UL (ref 0–0.2)
BASOPHILS NFR BLD: 0.6 %
BUN SERPL-MCNC: 9 MG/DL (ref 7–20)
CALCIUM SERPL-MCNC: 8.5 MG/DL (ref 8.3–10.6)
CHLORIDE SERPL-SCNC: 102 MMOL/L (ref 99–110)
CO2 SERPL-SCNC: 30 MMOL/L (ref 21–32)
CREAT SERPL-MCNC: 0.6 MG/DL (ref 0.6–1.1)
DEPRECATED RDW RBC AUTO: 20.2 % (ref 12.4–15.4)
EOSINOPHIL # BLD: 0.1 K/UL (ref 0–0.6)
EOSINOPHIL NFR BLD: 3 %
GAMMA GLOB SERPL ELPH-MCNC: 1.1 G/DL (ref 0.6–1.8)
GFR SERPLBLD CREATININE-BSD FMLA CKD-EPI: >60 ML/MIN/{1.73_M2}
GLUCOSE SERPL-MCNC: 79 MG/DL (ref 70–99)
HCT VFR BLD AUTO: 29.6 % (ref 36–48)
HGB BLD-MCNC: 9.3 G/DL (ref 12–16)
LYMPHOCYTES # BLD: 1 K/UL (ref 1–5.1)
LYMPHOCYTES NFR BLD: 33.4 %
MAGNESIUM SERPL-MCNC: 1.3 MG/DL (ref 1.8–2.4)
MCH RBC QN AUTO: 25.3 PG (ref 26–34)
MCHC RBC AUTO-ENTMCNC: 31.4 G/DL (ref 31–36)
MCV RBC AUTO: 80.7 FL (ref 80–100)
MONOCYTES # BLD: 0.3 K/UL (ref 0–1.3)
MONOCYTES NFR BLD: 9.5 %
NEUTROPHILS # BLD: 1.6 K/UL (ref 1.7–7.7)
NEUTROPHILS NFR BLD: 53.5 %
PLATELET # BLD AUTO: 50 K/UL (ref 135–450)
PMV BLD AUTO: 8.3 FL (ref 5–10.5)
POTASSIUM SERPL-SCNC: 3.3 MMOL/L (ref 3.5–5.1)
PROT SERPL-MCNC: 5.6 G/DL (ref 6.4–8.2)
RBC # BLD AUTO: 3.67 M/UL (ref 4–5.2)
SODIUM SERPL-SCNC: 141 MMOL/L (ref 136–145)
SPE/IFE INTERPRETATION: NORMAL
WBC # BLD AUTO: 3 K/UL (ref 4–11)

## 2024-02-14 PROCEDURE — 6370000000 HC RX 637 (ALT 250 FOR IP): Performed by: INTERNAL MEDICINE

## 2024-02-14 PROCEDURE — 6370000000 HC RX 637 (ALT 250 FOR IP): Performed by: NURSE PRACTITIONER

## 2024-02-14 PROCEDURE — 80048 BASIC METABOLIC PNL TOTAL CA: CPT

## 2024-02-14 PROCEDURE — 85025 COMPLETE CBC W/AUTO DIFF WBC: CPT

## 2024-02-14 PROCEDURE — 83735 ASSAY OF MAGNESIUM: CPT

## 2024-02-14 PROCEDURE — 94640 AIRWAY INHALATION TREATMENT: CPT

## 2024-02-14 PROCEDURE — 2060000000 HC ICU INTERMEDIATE R&B

## 2024-02-14 PROCEDURE — 2580000003 HC RX 258: Performed by: NURSE PRACTITIONER

## 2024-02-14 PROCEDURE — 6360000002 HC RX W HCPCS: Performed by: INTERNAL MEDICINE

## 2024-02-14 PROCEDURE — 2700000000 HC OXYGEN THERAPY PER DAY

## 2024-02-14 PROCEDURE — 6360000002 HC RX W HCPCS: Performed by: HOSPITALIST

## 2024-02-14 PROCEDURE — 36415 COLL VENOUS BLD VENIPUNCTURE: CPT

## 2024-02-14 PROCEDURE — 6370000000 HC RX 637 (ALT 250 FOR IP): Performed by: HOSPITALIST

## 2024-02-14 PROCEDURE — 94761 N-INVAS EAR/PLS OXIMETRY MLT: CPT

## 2024-02-14 RX ORDER — GABAPENTIN 100 MG/1
200 CAPSULE ORAL 3 TIMES DAILY
Status: DISCONTINUED | OUTPATIENT
Start: 2024-02-15 | End: 2024-02-15 | Stop reason: HOSPADM

## 2024-02-14 RX ADMIN — VALACYCLOVIR HYDROCHLORIDE 500 MG: 500 TABLET, FILM COATED ORAL at 10:01

## 2024-02-14 RX ADMIN — MAGNESIUM SULFATE HEPTAHYDRATE 2000 MG: 40 INJECTION, SOLUTION INTRAVENOUS at 10:17

## 2024-02-14 RX ADMIN — SODIUM CHLORIDE, PRESERVATIVE FREE 10 ML: 5 INJECTION INTRAVENOUS at 10:01

## 2024-02-14 RX ADMIN — SODIUM CHLORIDE, PRESERVATIVE FREE 10 ML: 5 INJECTION INTRAVENOUS at 20:03

## 2024-02-14 RX ADMIN — AMIODARONE HYDROCHLORIDE 200 MG: 200 TABLET ORAL at 20:01

## 2024-02-14 RX ADMIN — GABAPENTIN 300 MG: 300 CAPSULE ORAL at 15:27

## 2024-02-14 RX ADMIN — GABAPENTIN 300 MG: 300 CAPSULE ORAL at 10:01

## 2024-02-14 RX ADMIN — AMIODARONE HYDROCHLORIDE 200 MG: 200 TABLET ORAL at 10:01

## 2024-02-14 RX ADMIN — FUROSEMIDE 20 MG: 20 TABLET ORAL at 10:01

## 2024-02-14 RX ADMIN — PAROXETINE HYDROCHLORIDE 20 MG: 20 TABLET, FILM COATED ORAL at 20:01

## 2024-02-14 RX ADMIN — POTASSIUM CHLORIDE 40 MEQ: 1500 TABLET, EXTENDED RELEASE ORAL at 18:03

## 2024-02-14 RX ADMIN — PANTOPRAZOLE SODIUM 40 MG: 40 TABLET, DELAYED RELEASE ORAL at 06:15

## 2024-02-14 RX ADMIN — MAGNESIUM SULFATE HEPTAHYDRATE 2000 MG: 40 INJECTION, SOLUTION INTRAVENOUS at 06:18

## 2024-02-14 RX ADMIN — PROCHLORPERAZINE EDISYLATE 10 MG: 5 INJECTION INTRAMUSCULAR; INTRAVENOUS at 10:44

## 2024-02-14 RX ADMIN — FUROSEMIDE 20 MG: 20 TABLET ORAL at 20:01

## 2024-02-14 RX ADMIN — Medication 100 MG: at 10:01

## 2024-02-14 RX ADMIN — MOMETASONE FUROATE AND FORMOTEROL FUMARATE DIHYDRATE 2 PUFF: 100; 5 AEROSOL RESPIRATORY (INHALATION) at 07:48

## 2024-02-14 RX ADMIN — IRON SUCROSE 200 MG: 20 INJECTION, SOLUTION INTRAVENOUS at 10:01

## 2024-02-14 NOTE — RT PROTOCOL NOTE
RT Inhaler-Nebulizer Bronchodilator Protocol Note    There is a bronchodilator order in the chart from a provider indicating to follow the RT Bronchodilator Protocol and there is an “Initiate RT Inhaler-Nebulizer Bronchodilator Protocol” order as well (see protocol at bottom of note).    CXR Findings:  XR CHEST PORTABLE    Result Date: 2/8/2024  Overlying EKG lead and pacing artifact severely limiting the exam otherwise, unremarkable.  Recommend follow-up after removal of the overlying artifact to assure stability.       The findings from the last RT Protocol Assessment were as follows:   History Pulmonary Disease: Chronic pulmonary disease  Respiratory Pattern: Dyspnea on exertion or RR 21-25 bpm  Breath Sounds: Slightly diminished and/or crackles  Cough: Strong, spontaneous, non-productive  Indication for Bronchodilator Therapy: On home bronchodilators  Bronchodilator Assessment Score: 6. Changed to BID per protocol. Pt states only takes rescue inhaler when needed    Aerosolized bronchodilator medication orders have been revised according to the RT Inhaler-Nebulizer Bronchodilator Protocol below.    Respiratory Therapist to perform RT Therapy Protocol Assessment initially then follow the protocol.  Repeat RT Therapy Protocol Assessment PRN for score 0-3 or on second treatment, BID, and PRN for scores above 3.    No Indications - adjust the frequency to every 6 hours PRN wheezing or bronchospasm, if no treatments needed after 48 hours then discontinue using Per Protocol order mode.     If indication present, adjust the RT bronchodilator orders based on the Bronchodilator Assessment Score as indicated below.  Use Inhaler orders unless patient has one or more of the following: on home nebulizer, not able to hold breath for 10 seconds, is not alert and oriented, cannot activate and use MDI correctly, or respiratory rate 25 breaths per minute or more, then use the equivalent nebulizer order(s) with same Frequency and 
wheezing or increased work of breathing using Per Protocol order mode.        4-6 - enter or revise RT Bronchodilator order(s) to two equivalent RT bronchodilator orders with one order with BID Frequency and one order with Frequency of every 4 hours PRN wheezing or increased work of breathing using Per Protocol order mode.        7-10 - enter or revise RT Bronchodilator order(s) to two equivalent RT bronchodilator orders with one order with TID Frequency and one order with Frequency of every 4 hours PRN wheezing or increased work of breathing using Per Protocol order mode.       11-13 - enter or revise RT Bronchodilator order(s) to one equivalent RT bronchodilator order with QID Frequency and an Albuterol order with Frequency of every 4 hours PRN wheezing or increased work of breathing using Per Protocol order mode.      Greater than 13 - enter or revise RT Bronchodilator order(s) to one equivalent RT bronchodilator order with every 4 hours Frequency and an Albuterol order with Frequency of every 2 hours PRN wheezing or increased work of breathing using Per Protocol order mode.     RT to enter RT Home Evaluation for COPD & MDI Assessment order using Per Protocol order mode.    Electronically signed by Nhi Yoo RCP on 2/13/2024 at 7:23 PM

## 2024-02-15 VITALS
SYSTOLIC BLOOD PRESSURE: 113 MMHG | OXYGEN SATURATION: 93 % | RESPIRATION RATE: 16 BRPM | WEIGHT: 132.28 LBS | BODY MASS INDEX: 20.76 KG/M2 | HEIGHT: 67 IN | TEMPERATURE: 97.8 F | HEART RATE: 74 BPM | DIASTOLIC BLOOD PRESSURE: 68 MMHG

## 2024-02-15 LAB
BASOPHILS # BLD: 0 K/UL (ref 0–0.2)
BASOPHILS NFR BLD: 0.5 %
DEPRECATED RDW RBC AUTO: 20.1 % (ref 12.4–15.4)
EOSINOPHIL # BLD: 0.1 K/UL (ref 0–0.6)
EOSINOPHIL NFR BLD: 2.3 %
HCT VFR BLD AUTO: 28.4 % (ref 36–48)
HGB BLD-MCNC: 9.1 G/DL (ref 12–16)
LYMPHOCYTES # BLD: 0.9 K/UL (ref 1–5.1)
LYMPHOCYTES NFR BLD: 27.1 %
MAGNESIUM SERPL-MCNC: 1.6 MG/DL (ref 1.8–2.4)
MCH RBC QN AUTO: 25.8 PG (ref 26–34)
MCHC RBC AUTO-ENTMCNC: 32.2 G/DL (ref 31–36)
MCV RBC AUTO: 80 FL (ref 80–100)
MONOCYTES # BLD: 0.4 K/UL (ref 0–1.3)
MONOCYTES NFR BLD: 12.8 %
NEUTROPHILS # BLD: 1.8 K/UL (ref 1.7–7.7)
NEUTROPHILS NFR BLD: 57.3 %
PLATELET # BLD AUTO: 46 K/UL (ref 135–450)
PMV BLD AUTO: 8.9 FL (ref 5–10.5)
RBC # BLD AUTO: 3.55 M/UL (ref 4–5.2)
WBC # BLD AUTO: 3.1 K/UL (ref 4–11)

## 2024-02-15 PROCEDURE — 6370000000 HC RX 637 (ALT 250 FOR IP): Performed by: NURSE PRACTITIONER

## 2024-02-15 PROCEDURE — 94760 N-INVAS EAR/PLS OXIMETRY 1: CPT

## 2024-02-15 PROCEDURE — 36415 COLL VENOUS BLD VENIPUNCTURE: CPT

## 2024-02-15 PROCEDURE — 6360000002 HC RX W HCPCS: Performed by: HOSPITALIST

## 2024-02-15 PROCEDURE — 6360000002 HC RX W HCPCS: Performed by: INTERNAL MEDICINE

## 2024-02-15 PROCEDURE — 94640 AIRWAY INHALATION TREATMENT: CPT

## 2024-02-15 PROCEDURE — 2580000003 HC RX 258: Performed by: NURSE PRACTITIONER

## 2024-02-15 PROCEDURE — 6370000000 HC RX 637 (ALT 250 FOR IP): Performed by: INTERNAL MEDICINE

## 2024-02-15 PROCEDURE — 6370000000 HC RX 637 (ALT 250 FOR IP): Performed by: HOSPITALIST

## 2024-02-15 PROCEDURE — 97162 PT EVAL MOD COMPLEX 30 MIN: CPT

## 2024-02-15 PROCEDURE — 2580000003 HC RX 258: Performed by: INTERNAL MEDICINE

## 2024-02-15 PROCEDURE — 85025 COMPLETE CBC W/AUTO DIFF WBC: CPT

## 2024-02-15 PROCEDURE — 83735 ASSAY OF MAGNESIUM: CPT

## 2024-02-15 RX ORDER — MAGNESIUM SULFATE IN WATER 40 MG/ML
2000 INJECTION, SOLUTION INTRAVENOUS ONCE
Status: DISCONTINUED | OUTPATIENT
Start: 2024-02-15 | End: 2024-02-15

## 2024-02-15 RX ORDER — AMIODARONE HYDROCHLORIDE 200 MG/1
200 TABLET ORAL DAILY
Qty: 30 TABLET | Refills: 0 | Status: SHIPPED | OUTPATIENT
Start: 2024-02-16

## 2024-02-15 RX ADMIN — AMIODARONE HYDROCHLORIDE 200 MG: 200 TABLET ORAL at 10:38

## 2024-02-15 RX ADMIN — Medication 100 MG: at 10:38

## 2024-02-15 RX ADMIN — VALACYCLOVIR HYDROCHLORIDE 500 MG: 500 TABLET, FILM COATED ORAL at 10:47

## 2024-02-15 RX ADMIN — GABAPENTIN 200 MG: 100 CAPSULE ORAL at 10:38

## 2024-02-15 RX ADMIN — PANTOPRAZOLE SODIUM 40 MG: 40 TABLET, DELAYED RELEASE ORAL at 05:11

## 2024-02-15 RX ADMIN — IRON SUCROSE 200 MG: 20 INJECTION, SOLUTION INTRAVENOUS at 10:38

## 2024-02-15 RX ADMIN — SODIUM CHLORIDE, PRESERVATIVE FREE 10 ML: 5 INJECTION INTRAVENOUS at 08:45

## 2024-02-15 RX ADMIN — MOMETASONE FUROATE AND FORMOTEROL FUMARATE DIHYDRATE 2 PUFF: 100; 5 AEROSOL RESPIRATORY (INHALATION) at 07:57

## 2024-02-15 RX ADMIN — SODIUM CHLORIDE 25 ML: 9 INJECTION, SOLUTION INTRAVENOUS at 08:49

## 2024-02-15 RX ADMIN — PROCHLORPERAZINE EDISYLATE 10 MG: 5 INJECTION INTRAMUSCULAR; INTRAVENOUS at 02:48

## 2024-02-15 RX ADMIN — MAGNESIUM SULFATE HEPTAHYDRATE 2000 MG: 40 INJECTION, SOLUTION INTRAVENOUS at 08:50

## 2024-02-15 RX ADMIN — TOPICAL ANESTHETIC: 200 SPRAY DENTAL; PERIODONTAL at 02:46

## 2024-02-15 RX ADMIN — PROCHLORPERAZINE EDISYLATE 10 MG: 5 INJECTION INTRAMUSCULAR; INTRAVENOUS at 08:45

## 2024-02-15 NOTE — PROGRESS NOTES
Hospitalist Progress Note  2/10/2024 7:49 AM    PCP: Romi Denis MD    [id number]     Date of Admission: 2/8/2024                                                                                                                     HOSPITAL COURSE    Patient demographics:  The patient  Lali Cazares is a 48 y.o. female     Significant past medical history:   Patient Active Problem List   Diagnosis    Dysthymic disorder    ETOH abuse    Eczema of hand    Asthma    Drug overdose, multiple drugs    Heroin withdrawal (HCC)    Suicidal ideation    Drug dependence (HCC)    Abscess of left forearm    Polysubstance abuse ([facility])    Encounter for smoking cessation counseling    Gangrene ([facility])    Cellulitis of left upper extremity    Abscess    Leukocytosis    Non-O157 Shiga toxin-producing Escherichia coli (E.coli)    Hep C w/o coma, chronic ([facility])    Recurrent genital herpes    Closed displaced fracture of right calcaneus    Current smoker    Abdominal pain    Alcohol withdrawal syndrome with complication ([facility])    Ventricular tachycardia (HCC)    Cocaine abuse ([facility])    Hypokalemia    Prolonged QT interval    Elevated troponin         Presenting symptoms:  Tachycardia    Diagnostic workup:      CONSULTS DURING ADMISSION :   IP CONSULT TO CARDIOLOGY  IP CONSULT TO SOCIAL WORK      Patient was diagnosed with:  Polymorphic ventricular tachycardia  Cocaine abuse  Asthma  Recurrent genital herpes      Treatment while inpatient:  Lali Cazares is a 48 y.o. female smoker polysubstance user with past medical history significant for alcohol abuse, tobacco abuse, opiate dependence and  cocaine use.     patient  presented with Polymorphic ventricular tachycardia                                                                                       ----------------------------------------------------------      SUBJECTIVE COMPLAINTS- Tachycardia    Diet: ADULT DIET; Regular      OBJECTIVE:   Patient Active Problem List 
    Hospitalist Progress Note  2/11/2024 9:56 AM    PCP: Romi Denis MD    [id number]     Date of Admission: 2/8/2024                                                                                                                     HOSPITAL COURSE    Patient demographics:  The patient  Lali Cazares is a 48 y.o. female     Significant past medical history:   Patient Active Problem List   Diagnosis    Dysthymic disorder    ETOH abuse    Eczema of hand    Asthma    Drug overdose, multiple drugs    Heroin withdrawal (HCC)    Suicidal ideation    Drug dependence (HCC)    Abscess of left forearm    Polysubstance abuse ([facility])    Encounter for smoking cessation counseling    Gangrene ([facility])    Cellulitis of left upper extremity    Abscess    Leukocytosis    Non-O157 Shiga toxin-producing Escherichia coli (E.coli)    Hep C w/o coma, chronic ([facility])    Recurrent genital herpes    Closed displaced fracture of right calcaneus    Current smoker    Abdominal pain    Alcohol withdrawal syndrome with complication ([facility])    Ventricular tachycardia (HCC)    Cocaine abuse ([facility])    Hypokalemia    Prolonged QT interval    Elevated troponin         Presenting symptoms:  Tachycardia    Diagnostic workup:      CONSULTS DURING ADMISSION :   IP CONSULT TO CARDIOLOGY  IP CONSULT TO SOCIAL WORK      Patient was diagnosed with:  Polymorphic ventricular tachycardia  Cocaine abuse  Asthma  Recurrent genital herpes      Treatment while inpatient:  Lali Cazares is a 48 y.o. female smoker polysubstance user with past medical history significant for alcohol abuse, tobacco abuse, opiate dependence and  cocaine use.     patient  presented with Polymorphic ventricular tachycardia                                                                                       ----------------------------------------------------------      SUBJECTIVE COMPLAINTS- Tachycardia    Diet: ADULT DIET; Regular      OBJECTIVE:   Patient Active Problem List 
    Hospitalist Progress Note  2/12/2024 9:00 AM    PCP: Romi Denis MD    [id number]     Date of Admission: 2/8/2024                                                                                                                     HOSPITAL COURSE    Patient demographics:  The patient  Lali Cazares is a 48 y.o. female     Significant past medical history:   Patient Active Problem List   Diagnosis    Dysthymic disorder    ETOH abuse    Eczema of hand    Asthma    Drug overdose, multiple drugs    Heroin withdrawal (HCC)    Suicidal ideation    Drug dependence (HCC)    Abscess of left forearm    Polysubstance abuse ([facility])    Encounter for smoking cessation counseling    Gangrene ([facility])    Cellulitis of left upper extremity    Abscess    Leukocytosis    Non-O157 Shiga toxin-producing Escherichia coli (E.coli)    Hep C w/o coma, chronic ([facility])    Recurrent genital herpes    Closed displaced fracture of right calcaneus    Current smoker    Abdominal pain    Alcohol withdrawal syndrome with complication ([facility])    Ventricular tachycardia ([facility])    Cocaine abuse ([facility])    Hypokalemia    Prolonged QT interval    Elevated troponin         Presenting symptoms:  Tachycardia    Diagnostic workup:      CONSULTS DURING ADMISSION :   IP CONSULT TO CARDIOLOGY  IP CONSULT TO SOCIAL WORK      Patient was diagnosed with:  Polymorphic ventricular tachycardia  Cocaine abuse  Asthma  Recurrent genital herpes      Treatment while inpatient:  Lali Cazares is a 48 y.o. female smoker polysubstance user with past medical history significant for alcohol abuse, tobacco abuse, opiate dependence and  cocaine use.     patient  presented with Polymorphic ventricular tachycardia                                                                                       ----------------------------------------------------------      SUBJECTIVE COMPLAINTS- Tachycardia    Diet: ADULT DIET; Regular      OBJECTIVE:   Patient Active Problem List 
    Hospitalist Progress Note  2/13/2024 11:15 AM    PCP: Romi Denis MD    [id number]     Date of Admission: 2/8/2024                                                                                                                     HOSPITAL COURSE    Patient demographics:  The patient  Lali Caazres is a 48 y.o. female     Significant past medical history:   Patient Active Problem List   Diagnosis    Dysthymic disorder    ETOH abuse    Eczema of hand    Asthma    Drug overdose, multiple drugs    Heroin withdrawal (HCC)    Suicidal ideation    Drug dependence (HCC)    Abscess of left forearm    Polysubstance abuse (HCC)    Encounter for smoking cessation counseling    Gangrene ([facility])    Cellulitis of left upper extremity    Abscess    Leukocytosis    Non-O157 Shiga toxin-producing Escherichia coli (E.coli)    Hep C w/o coma, chronic (HCC)    Recurrent genital herpes    Closed displaced fracture of right calcaneus    Current smoker    Abdominal pain    Alcohol withdrawal syndrome with complication (HCC)    Ventricular tachycardia (HCC)    Cocaine abuse ([facility])    Hypokalemia    Prolonged QT interval    Elevated troponin         Presenting symptoms:  Tachycardia    Diagnostic workup:      CONSULTS DURING ADMISSION :   IP CONSULT TO CARDIOLOGY  IP CONSULT TO SOCIAL WORK  IP CONSULT TO ONCOLOGY      Patient was diagnosed with:  Polymorphic ventricular tachycardia  Cocaine abuse  Asthma  Recurrent genital herpes      Treatment while inpatient:  Lali Cazares is a 48 y.o. female smoker polysubstance user with past medical history significant for alcohol abuse, tobacco abuse, opiate dependence and  cocaine use.     patient  presented with Polymorphic ventricular tachycardia                                                                                       ----------------------------------------------------------      SUBJECTIVE COMPLAINTS- Tachycardia    Diet: ADULT DIET; Regular      OBJECTIVE: 
    Hospitalist Progress Note  2/9/2024 12:33 PM    PCP: Romi Denis MD    [id number]     Date of Admission: 2/8/2024                                                                                                                     HOSPITAL COURSE    Patient demographics:  The patient  Lali Cazares is a 48 y.o. female     Significant past medical history:   Patient Active Problem List   Diagnosis    Dysthymic disorder    ETOH abuse    Eczema of hand    Asthma    Drug overdose, multiple drugs    Heroin withdrawal (HCC)    Suicidal ideation    Drug dependence (HCC)    Abscess of left forearm    Polysubstance abuse (HCC)    Encounter for smoking cessation counseling    Gangrene ([facility])    Cellulitis of left upper extremity    Abscess    Leukocytosis    Non-O157 Shiga toxin-producing Escherichia coli (E.coli)    Hep C w/o coma, chronic (HCC)    Recurrent genital herpes    Closed displaced fracture of right calcaneus    Current smoker    Abdominal pain    Alcohol withdrawal syndrome with complication (HCC)    Ventricular tachycardia (HCC)    Cocaine abuse ([facility])    Hypokalemia    Prolonged QT interval    Elevated troponin         Presenting symptoms:  Tachycardia    Diagnostic workup:      CONSULTS DURING ADMISSION :   IP CONSULT TO CARDIOLOGY      Patient was diagnosed with:  Polymorphic ventricular tachycardia  Cocaine abuse  Asthma  Recurrent genital herpes      Treatment while inpatient:  Lali Cazares is a 48 y.o. female smoker polysubstance user with past medical history significant for alcohol abuse, tobacco abuse, opiate dependence and  cocaine use.     patient  presented with Polymorphic ventricular tachycardia                                                                                       ----------------------------------------------------------      SUBJECTIVE COMPLAINTS- Tachycardia    Diet: Diet NPO Exceptions are: Sips of Water with Meds, Ice Chips      OBJECTIVE:   Patient Active 
2157: Tessy Jeronimo NP notified that patient's heart rhythm was reading vent bigeminy on the monitor on arrival. Potassium was rechecked on the EPOC and results at 4.4. At the time JESUS Jeronimo NP was notified patient was back in NSR.     2201: Tessy Jeronimo NP notified that patient is complaining of chest tightness/pain that she rates a 3/10 and describes as dull and that QTC is prolonged at 746. New orders received to check a CMP, magnesium, and ionized calcium. EKG ordered per protocol for chest pain/tightness.     2214: EKG complete and notified Tessy Jeronimo NP of EKG reading.     2232: Tessy Jeronimo NP notified that patient's BP has been trending down since being admitted to ICU. Patient BP was 110/79 (90) -> 100/56 (71) -> 87/64 (71). New orders received to give a 500 mL/bolus and to hold IVF until bolus is complete. See MAR.     2249: Tessy Jeronimo NP notified that patient's troponin went from 16 to 20. New orders received to check a lipase.     2302: Tessy Jeronimo NP notified of ionized calcium being low at 1.08. No new orders at this time.   
4 Eyes Skin Assessment     NAME:  Lali Cazares  YOB: 1975  MEDICAL RECORD NUMBER:  [id number]    The patient is being assessed for  Admission    I agree that at least one RN has performed a thorough Head to Toe Skin Assessment on the patient. ALL assessment sites listed below have been assessed.      Areas assessed by both nurses:    Head, Face, Ears, Shoulders, Back, Chest, Arms, Elbows, Hands, Sacrum. Buttock, Coccyx, Ischium, Legs. Feet and Heels, and Under Medical Devices         Does the Patient have a Wound? No noted wound(s)       Waqar Prevention initiated by RN: Yes  Wound Care Orders initiated by RN: No    Pressure Injury (Stage 3,4, Unstageable, DTI, NWPT, and Complex wounds) if present, place Wound referral order by RN under : No    New Ostomies, if present place, Ostomy referral order under : No     Nurse 1 eSignature: Electronically signed by Antonina Perez RN on 2/8/24 at 11:34 PM EST    **SHARE this note so that the co-signing nurse can place an eSignature**    Nurse 2 eSignature: Electronically signed by Mindi Otero RN on 2/9/24 at 2:51 AM EST   
4 Eyes Skin Assessment     NAME:  Lali Cazares  YOB: 1975  MEDICAL RECORD NUMBER:  [id number]    The patient is being assessed for  Shift Handoff    I agree that at least one RN has performed a thorough Head to Toe Skin Assessment on the patient. ALL assessment sites listed below have been assessed.      Areas assessed by both nurses:    Head, Face, Ears, Shoulders, Back, Chest, Arms, Elbows, Hands, Sacrum. Buttock, Coccyx, Ischium, Legs. Feet and Heels, and Under Medical Devices         Does the Patient have a Wound? No noted wound(s)       Waqar Prevention initiated by RN: Yes  Wound Care Orders initiated by RN: No    Pressure Injury (Stage 3,4, Unstageable, DTI, NWPT, and Complex wounds) if present, place Wound referral order by RN under : No    New Ostomies, if present place, Ostomy referral order under : No     Nurse 1 eSignature: Electronically signed by Antonina Perez RN on 2/9/24 at 6:05 AM EST    **SHARE this note so that the co-signing nurse can place an eSignature**    Nurse 2 eSignature: {Esignature:[id number]}   
4 Eyes Skin Assessment     NAME:  Lali Cazares  YOB: 1975  MEDICAL RECORD NUMBER:  [id number]    The patient is being assessed for  Admission    I agree that at least one RN has performed a thorough Head to Toe Skin Assessment on the patient. ALL assessment sites listed below have been assessed.      Areas assessed by both nurses:    Head, Face, Ears, Shoulders, Back, Chest, Arms, Elbows, Hands, Sacrum. Buttock, Coccyx, Ischium, Legs. Feet and Heels, and Under Medical Devices         Does the Patient have a Wound? No noted wound(s)       Waqar Prevention initiated by RN: No  Wound Care Orders initiated by RN: No    Pressure Injury (Stage 3,4, Unstageable, DTI, NWPT, and Complex wounds) if present, place Wound referral order by RN under : No    New Ostomies, if present place, Ostomy referral order under : No     Nurse 1 eSignature: Electronically signed by Stephanie Gillespie RN on 2/11/24 at 5:30 AM EST    **SHARE this note so that the co-signing nurse can place an eSignature**    Nurse 2 eSignature: Electronically signed by Marichuy Torre RN on 2/11/24 at 5:33 AM EST    
APRN notified by RN of patient with s/sx ETOH withdrawal. CIWA score 11  -pt reports 2-3 24oz beer every few days. Last drink 2/7/2024 to the best of patient's recollection  -start routine CIWA monitoring/protocol with lorazepam PRN  -further based on re-eval by attending in AM    Asha Bustamante, APRN - NP   
Handoff / report completed with WILFRED Santo to resume care. Patient in bed asleep. VSS on room air. All questions answered.   
On CMP results, K was 3.1. Per PRN protocol, 40 mEq PO replaced. See MAR. Mg was 1.7 and per PRN protocol no replacements needed.   
Patient admitted from ICU.  Report from Abbie HARDIN.  Patient alert and oriented.  Patient in bed and resting.  Seizure precautions in place. Telemetry on and vitals taken.  Patient oriented to room and answered questions.  Patient is has bed alarm on , bed in lowest position and call light within reach.    Electronically signed by Stephanie Gillespie RN on 2/11/2024 at 5:33 AM    
Patient's mother Mahi called to check on status of patient. She also asked if social work can see pt to get her to go to Rehab. This nurse explained that the hospital staff cannot make her go to rehab if she is interested. Mahi verbalized understanding. Talked at length with Mahi about this. Mahi said that if her daughter doesn't want to go because she doesn't have any of her stuff then Mahi would bring it to her. This nurse asked pt if she is wanting to go to Rehab. Pt said \"I don't have anything here\" Nurse explained that her mom said she would get her stuff and bring it to her. Patient states \"I don't want her going into my house. And I know how to get into rehab if I need to.\" Nurse told pt to let this nurse know if she changes her mind. Pt is currently sitting up in bed eating breakfast.  
Patient's mother, Mahi Burch, called requesting update. RN at bedside and questioned patient if OK to speak with patient's mother regarding patient's reason for admission, diagnoses and treatment. Patient granted permission to speak with Mahi. Update provided. All questions answered to the best of this RN's ability.   
Patient's mother, Mahi called and was updated on patient's status. All questions answered, denies any other needs at this time.  
Patient's son here to pick her up. Pt taken to private vehicle via wheelchair.  
Patients family member very lethargic and slouched over patients bed. Family informed that visiting hours are over at 8pm and patient called other family member to come and pick family member up. Family member was escorted out by security. Patient resting in bed with no complaints.  
Pt arrive to ICU via stretcher from ED. Phone report received from Tony RN prior to arrival to unit. Pt attached to ICU Bedside monitor. Pt alert and oriented x4. Rhythm at time of arrival was vent bigeminy. Current infusions include Amio gtt infusing at 1mg/min and NS infusing at 75 mL/hr. Bed alarm active and call light within reach. Educated pt on use of call light and importance of calling RN before attempting to get out of bed.   
Pt is awake and said her boyfriend can pick her up around 1630.   
Pt refused MDI dulera  
Pt resting in bed with eyes closed holding the call light. No outward signs of pain. Respirations easy even no distress.   
Pt resting in bed with eyes closed. Pt did open eyes to name but it take a few attempts for pt to wake up fully. Pt complaining of nausea, given compazine as ordered prn. Pt states she does take zofran at home but ran out of it. Magnesium level is 1.6, given 2gm IV as ordered prn. Pt scored a 3 on CIWA and 3 on COWS. Pt reminded not to get out of bed on her own. Bed alarm is on. Call light within reach.   
Pt resting in bed with eyes closed. This nurse had to touch pt and say her name numerous times in order to wake her up and tell her that she has discharge orders and that her lunch is here. Pt shook her head and then closed her eyes. Audible snoring noted.   
Reviewed discharge instructions with patient. She is wanting to go to the lobby to wait on her boyfriend however nurse explained that it is safer for her to stay up in here. Pt is agreeable. Did review medications with patient, including purpose of medications, times of next dose, side effects. Pt verbalized understanding. Instructed pt to call Dr. Denis for a follow up appointment in 7-10 days. Pt verbalized understanding. Pt is currently waiting for her boyfriend to pick her up.   
  Patient Active Problem List   Diagnosis    Dysthymic disorder    ETOH abuse    Eczema of hand    Asthma    Drug overdose, multiple drugs    Heroin withdrawal (HCC)    Suicidal ideation    Drug dependence (HCC)    Abscess of left forearm    Polysubstance abuse (HCC)    Encounter for smoking cessation counseling    Gangrene (HCC)    Cellulitis of left upper extremity    Abscess    Leukocytosis    Non-O157 Shiga toxin-producing Escherichia coli (E.coli)    Hep C w/o coma, chronic (HCC)    Recurrent genital herpes    Closed displaced fracture of right calcaneus    Current smoker    Abdominal pain    Alcohol withdrawal syndrome with complication (HCC)    Ventricular tachycardia (HCC)    Cocaine abuse (HCC)    Hypokalemia    Prolonged QT interval    Elevated troponin       Allergies  Bactrim [sulfamethoxazole-trimethoprim] and Flagyl [metronidazole]    Medications    Scheduled Meds:   iron sucrose  200 mg IntraVENous Daily    valACYclovir  500 mg Oral Daily    PARoxetine  20 mg Oral Nightly    amiodarone  200 mg Oral BID    [START ON 2/16/2024] amiodarone  200 mg Oral Daily    sodium chloride flush  5-40 mL IntraVENous 2 times per day    thiamine  100 mg Oral Daily    [Held by provider] enoxaparin  40 mg SubCUTAneous Nightly    furosemide  20 mg Oral BID    gabapentin  300 mg Oral TID    mometasone-formoterol  2 puff Inhalation BID RT    pantoprazole  40 mg Oral QAM AC    vitamin D  50,000 Units Oral Weekly     Continuous Infusions:   sodium chloride 5 mL/hr at 02/11/24 1531    sodium chloride       PRN Meds:  prochlorperazine, benzocaine, perflutren lipid microspheres, sodium chloride flush, sodium chloride, diazePAM **OR** diazePAM **OR** diazePAM **OR** diazePAM **OR** diazePAM **OR** diazePAM **OR** diazePAM **OR** diazePAM, sodium chloride, potassium chloride **OR** potassium alternative oral replacement **OR** potassium chloride, magnesium sulfate, acetaminophen **OR** acetaminophen, polyethylene glycol, aluminum 
31.4 31.6 31.9 32.4   < > 32.0 31.6   < > 32.5   RDW 20.2* 19.8* 19.9* 19.3*   < > 20.0* 19.9*   < > 19.6*   PLT 50* 56* 63* 89*   < > 140 196   < > 60*    < > = values in this interval not displayed.       PT/INR:    Recent Labs     02/12/24 1625 02/09/24  0420 02/08/24 2231 02/08/24 1711 01/30/24 0425 01/26/24 0458 01/24/24  1832   PROT 5.6* 5.7* 6.0* 6.8 6.3*   < > 7.0   INR  --   --   --  1.44*  --   --  1.26*    < > = values in this interval not displayed.     PTT:  No results for input(s): \"APTT\" in the last 720 hours.    CMP:    Recent Labs     02/14/24 0447 02/13/24  0527 02/12/24 1625 02/12/24  0419 02/11/24  0410 02/10/24  1329 02/10/24  0342 02/09/24 1421 02/09/24 0420 02/08/24 2231 02/08/24 1711 01/30/24 0425     --   --  141 138  --  139  --  142 137 140 139   K 3.3*  --   --  3.8 3.6  --  4.2   < > 3.0* 3.1* 2.1* 3.1*     --   --  102 102  --  103  --  99 97* 93* 99   CO2 30  --   --  33* 30  --  27  --  31 28 23 32   GLUCOSE 79  --   --  85 92  --  102*  --  77 87 103* 88   BUN 9  --   --  10 8  --  9  --  7 7 7 9   CREATININE 0.6  --   --  0.7 0.8  --  0.7  --  0.8 0.7 0.9 0.5*   LABGLOM >60  --   --  >60 >60  --  >60  --  >60 >60 >60 >60   CALCIUM 8.5  --   --  8.7 8.3  --  8.1*  --  7.9* 7.8* 8.6 8.4   PROT  --   --  5.6*  --   --   --   --   --  5.7* 6.0* 6.8 6.3*   LABALBU  --   --   --   --   --   --   --   --  3.7 3.5 4.0 3.6   AGRATIO  --   --   --   --   --   --   --   --  1.9 1.4 1.4 1.3   BILITOT  --   --   --   --   --   --   --   --  1.9* 1.8* 1.4* 1.5*   ALKPHOS  --   --   --   --   --   --   --   --  130* 129 143* 156*   ALT  --   --   --   --   --   --   --   --  40 43* 47* 62*   AST  --   --   --   --   --   --   --   --  54* 63* 64* 127*   MG 1.30* 1.70*  --  1.60* 1.80   < >  --   --  1.60* 1.70* 3.80*  --     < > = values in this interval not displayed.       Lab Results   Component Value Date    CALCIUM 8.5 02/14/2024    PHOS 3.8 02/09/2024 
Limits  Subjective  Subjective: Pt supine in bed upon arrival, reports she is fine and going home today. Pt agreeable to PT evaluation but does not think she needs therapy.         Social/Functional History  Social/Functional History  Lives With: Family (Adult son and boyfriend and father in law)  Type of Home: House  Home Layout: One level, Laundry in basement  Home Access: Stairs to enter with rails  Entrance Stairs - Number of Steps: 12 with L rail ascending  Entrance Stairs - Rails: Left  Bathroom Shower/Tub: Tub/Shower unit  Bathroom Toilet: Standard  Bathroom Equipment: Grab bars around toilet, Grab bars in shower  Home Equipment:  (no DME)  Has the patient had two or more falls in the past year or any fall with injury in the past year?: No  ADL Assistance: Independent  Homemaking Assistance: Independent (Primary grocery shop)  Ambulation Assistance: Independent  Transfer Assistance: Independent  Active : No  Patient's  Info: unreliable transport pt  Additional Comments: hx of ETOH and IVDU (crack cocaine and/or heroin)  Vision/Hearing  Vision  Vision: Within Functional Limits  Hearing  Hearing: Within functional limits    Cognition   Orientation  Overall Orientation Status: Impaired  Cognition  Overall Cognitive Status: Exceptions  Arousal/Alertness: Delayed responses to stimuli  Safety Judgement: Decreased awareness of need for safety  Problem Solving: Assistance required to identify errors made  Insights: Decreased awareness of deficits     Objective           Gross Assessment  AROM: Generally decreased, functional  Strength: Generally decreased, functional  Coordination: Generally decreased, functional  Tone: Normal  Sensation: Intact (denies numbness and tingling)                    Bed mobility  Supine to Sit: Independent  Sit to Supine: Independent  Transfers  Sit to Stand: Modified independent  Stand to Sit: Modified independent  Comment: wide WILLIAM slight unsteady but performs without 
vessels are normal.  The lungs are mildly  hyperinflated.  No consolidation or effusion is seen.  There is no  pneumothorax.  The bones are intact.  Impression: Overlying EKG lead and pacing artifact severely limiting the exam otherwise,  unremarkable.  Recommend follow-up after removal of the overlying artifact to  assure stability.      Problem List  Patient Active Problem List   Diagnosis    Dysthymic disorder    ETOH abuse    Eczema of hand    Asthma    Drug overdose, multiple drugs    Heroin withdrawal (HCC)    Suicidal ideation    Drug dependence (HCC)    Abscess of left forearm    Polysubstance abuse (HCC)    Encounter for smoking cessation counseling    Gangrene (HCC)    Cellulitis of left upper extremity    Abscess    Leukocytosis    Non-O157 Shiga toxin-producing Escherichia coli (E.coli)    Hep C w/o coma, chronic (HCC)    Recurrent genital herpes    Closed displaced fracture of right calcaneus    Current smoker    Abdominal pain    Alcohol withdrawal syndrome with complication (HCC)    Ventricular tachycardia (HCC)    Cocaine abuse (HCC)    Hypokalemia    Prolonged QT interval    Elevated troponin       ASSESSMENT AND PLAN:  1.  Pancytopenia.  This is likely due to bone marrow suppression from her alcohol abuse.  She also has evidence of cirrhosis with hypersplenism.  In terms of her anemia, she is iron deficient.  I will give IV iron.  She will need a GI workup when she is more stable-perhaps as an outpatient.            ONCOLOGIC DISPOSITION:      Sandro Snowden Jr, MD  Please contact through [employer]

## 2024-02-15 NOTE — PLAN OF CARE
Problem: Discharge Planning  Goal: Discharge to home or other facility with appropriate resources  Outcome: Progressing  Note: Plan is for pt to be discharged today. This nurse had discussion about going to a rehab facility for drug/alcohol addiction and she is not interested. Social work/case management have seen pt as well. Plan is for pt to be discharged home back to her previous environment.     Problem: Safety - Adult  Goal: Free from fall injury  Outcome: Progressing  Note: Patient assessed for fall risk; fall precautions initiated. Patient and family instructed about safety devices. Environment kept free of clutter and adequate lighting provided.  Bed locked and in lowest position.  Call light within reach. Will continue to monitor.      Problem: ABCDS Injury Assessment  Goal: Absence of physical injury  Outcome: Progressing  Note: No signs of physical injury.     Problem: Pain  Goal: Verbalizes/displays adequate comfort level or baseline comfort level  Outcome: Progressing  Note: Pt has offered no complaints of pain, no outward signs of pain noted.     Problem: Skin/Tissue Integrity  Goal: Absence of new skin breakdown  Description: 1.  Monitor for areas of redness and/or skin breakdown  2.  Assess vascular access sites hourly  3.  Every 4-6 hours minimum:  Change oxygen saturation probe site  4.  Every 4-6 hours:  If on nasal continuous positive airway pressure, respiratory therapy assess nares and determine need for appliance change or resting period.  Outcome: Progressing  Note: Pt has multiple scabbed areas to all extremities. Skin assessment completed every shift. Pt assessed for incontinence, appropriate barrier cream applied prn.  Pt encouraged to turn/rotate every 2 hours. Assistance provided if pt unable to do so themselves.

## 2024-02-15 NOTE — DISCHARGE SUMMARY
V2.0  Discharge Summary    Name:  Lali Cazares /Age/Sex: 1975 (48 y.o. female)   Admit Date: 2024  Discharge Date: 2/15/24    MRN & CSN:  [id number] & [id number] Encounter Date and Time 2/15/24 11:30 AM EST    Attending:  Sincere Garcia MD Discharging Provider: Sincere Garcia MD       Hospital Course:     Patient is a 48-year-old female past medical history of chronic alcoholism, cirrhosis of the liver, asthma, recurrent genital herpes on antiviral therapy, who was admitted for pancytopenia, polymorphic V. tach, and alcohol abuse/withdrawal.  Patient was started on IV fluids, thiamine, multivitamins, and Librium.  She was seen by cardiology.  She was treated with amiodarone for polymorphic V. tach.  Patient was seen by hematology/oncology for pancytopenia and they stated that patient's pancytopenia is stable and is likely related to alcohol/hypersplenism/cirrhosis.  Since patient has not benefited the most from hospitalization, she will be discharged back home in stable condition.  She was extensively counseled on alcohol abstinence.    Discharge Instruction:     Primary care physician: Romi Denis MD within 2 weeks  Diet: cardiac diet   Activity: activity as tolerated  Disposition: Discharged to:   [x]Home, [][facility], []SNF, []Acute Rehab, []Hospice   Condition on discharge: Stable    Discharge Medications:        Medication List        START taking these medications      amiodarone 200 MG tablet  Commonly known as: CORDARONE  Take 1 tablet by mouth daily  Start taking on: 2024            CHANGE how you take these medications      valACYclovir 500 MG tablet  Commonly known as: VALTREX  Po daily  What changed:   how much to take  how to take this  when to take this  additional instructions            CONTINUE taking these medications      albuterol sulfate  (90 Base) MCG/ACT inhaler  Commonly known as: Ventolin HFA  Inhale 2 puffs into the lungs 4 times daily as needed for

## 2024-02-15 NOTE — CARE COORDINATION
Case Management Discharge Note          Date / Time of Note: 2/15/2024 1:29 PM                  Patient Name: Lali Cazares   YOB: 1975  Diagnosis: Hypokalemia [E87.6]  Elevated troponin [R79.89]  Polymorphic ventricular tachycardia (HCC) [I47.29]  Cocaine use [F14.90]   Date / Time: 2/8/2024  4:44 PM    Financial:  Payor: [facility] / Plan: [facility] MEDICAID / Product Type: *No Product type* /      Pharmacy:    [facility] [id number] [place], [place] - [address] - P [phone] - F [phone]  [address] [facility] [address]  Phone: [phone] Fax: [phone]      Assistance purchasing medications?: Potential Assistance Purchasing Medications: No  Assistance provided by Case Management: None at this time    DISCHARGE Disposition: Home- No Services Needed    Transportation:  Transportation PLAN for discharge: family   Mode of Transport: Private Car  Time of Transport: patient stated her boyfriend will pick her up when she calls him    IMM Completed:   Not Indicated    Additional CM Notes:   DC order noted.  Spoke to patient at bedside.  Patient denied any discharge needs.  Patient stated her boyfriend will pick her up later when she calls him.    The Plan for Transition of Care is related to the following treatment goals of Hypokalemia [E87.6]  Elevated troponin [R79.89]  Polymorphic ventricular tachycardia (HCC) [I47.29]  Cocaine use [F14.90]    The Patient and/or patient representative Lali and her family were provided with a choice of provider and agrees with the discharge plan Yes    Freedom of choice list was provided with basic dialogue that supports the patient's individualized plan of care/goals and shares the quality data associated with the providers. Not Indicated    Vandana Coburn RN  [facility]   Case Management Department  Ph: [phone]  
DISCHARGE PLANNING:    DC plan to return home with significant other.  Denied any needs, states she has all resources needed pertaining to substance abuse.    #[phone]  Electronically signed by Vandana Coburn RN on 2/12/2024 at 3:33 PM    
goal is to return home when medically stable. Patient states she has all resources she needs at this time pertaining to substance abuse.    Per chart review, referral was placed during last admission to Arlene Roldan with [facility]. Call placed today to inform Arlene of patient readmission. Arlene to make contact with patient while inpatient.    CM to continue to follow.    The Plan for Transition of Care is related to the following treatment goals of Hypokalemia [E87.6]  Elevated troponin [R79.89]  Polymorphic ventricular tachycardia (HCC) [I47.29]  Cocaine use [F14.90]    SUE HAHN RN  Case Management Department  Ph: [phone]

## 2024-02-26 ENCOUNTER — HOSPITAL ENCOUNTER (EMERGENCY)
Age: 49
Discharge: HOME | End: 2024-02-26
Payer: COMMERCIAL

## 2024-02-26 VITALS
WEIGHT: 132.28 LBS | HEIGHT: 67 IN | TEMPERATURE: 97.9 F | SYSTOLIC BLOOD PRESSURE: 120 MMHG | DIASTOLIC BLOOD PRESSURE: 81 MMHG | HEART RATE: 82 BPM | BODY MASS INDEX: 20.76 KG/M2 | RESPIRATION RATE: 16 BRPM | OXYGEN SATURATION: 94 %

## 2024-02-26 DIAGNOSIS — K08.89 PAIN, DENTAL: Primary | ICD-10-CM

## 2024-02-26 PROCEDURE — 6370000000 HC RX 637 (ALT 250 FOR IP): Performed by: NURSE PRACTITIONER

## 2024-02-26 PROCEDURE — 99283 EMERGENCY DEPT VISIT LOW MDM: CPT

## 2024-02-26 RX ORDER — LIDOCAINE HYDROCHLORIDE 20 MG/ML
15 SOLUTION OROPHARYNGEAL ONCE
Status: COMPLETED | OUTPATIENT
Start: 2024-02-26 | End: 2024-02-26

## 2024-02-26 RX ORDER — ACETAMINOPHEN 325 MG/1
650 TABLET ORAL ONCE
Status: DISCONTINUED | OUTPATIENT
Start: 2024-02-26 | End: 2024-02-26

## 2024-02-26 RX ORDER — PENICILLIN V POTASSIUM 500 MG/1
500 TABLET ORAL 4 TIMES DAILY
Qty: 28 TABLET | Refills: 0 | Status: SHIPPED | OUTPATIENT
Start: 2024-02-26 | End: 2024-03-04

## 2024-02-26 RX ORDER — IBUPROFEN 600 MG/1
600 TABLET ORAL 3 TIMES DAILY PRN
Qty: 15 TABLET | Refills: 0 | Status: SHIPPED | OUTPATIENT
Start: 2024-02-26 | End: 2024-03-02

## 2024-02-26 RX ORDER — PENICILLIN V POTASSIUM 250 MG/1
500 TABLET ORAL ONCE
Status: COMPLETED | OUTPATIENT
Start: 2024-02-26 | End: 2024-02-26

## 2024-02-26 RX ADMIN — PENICILLIN V POTASIUM 500 MG: 250 TABLET ORAL at 22:32

## 2024-02-26 RX ADMIN — Medication 15 ML: at 22:36

## 2024-02-26 RX ADMIN — IBUPROFEN 600 MG: 200 TABLET, FILM COATED ORAL at 22:32

## 2024-02-26 ASSESSMENT — PAIN SCALES - GENERAL: PAINLEVEL_OUTOF10: 10

## 2024-02-26 ASSESSMENT — PAIN - FUNCTIONAL ASSESSMENT: PAIN_FUNCTIONAL_ASSESSMENT: ACTIVITIES ARE NOT PREVENTED

## 2024-02-26 ASSESSMENT — PAIN DESCRIPTION - DESCRIPTORS: DESCRIPTORS: DISCOMFORT;THROBBING

## 2024-02-26 ASSESSMENT — PAIN DESCRIPTION - ONSET: ONSET: PROGRESSIVE

## 2024-02-26 ASSESSMENT — PAIN DESCRIPTION - FREQUENCY: FREQUENCY: CONTINUOUS

## 2024-02-26 ASSESSMENT — PAIN DESCRIPTION - LOCATION: LOCATION: MOUTH

## 2024-02-26 ASSESSMENT — PAIN DESCRIPTION - PAIN TYPE: TYPE: ACUTE PAIN

## 2024-02-26 ASSESSMENT — PAIN DESCRIPTION - ORIENTATION: ORIENTATION: LEFT

## 2024-02-27 NOTE — ED PROVIDER NOTES
[facility] EMERGENCY DEPARTMENT  EMERGENCY DEPARTMENT ENCOUNTER        Pt Name: Lali Cazares  MRN: [id number]  Birthdate 1975  Date of evaluation: 2024  Provider: MACK Travis - STEVE  PCP: Romi Denis MD  Note Started: 3:28 AM EST 24      {Shared Not Shared:16813}      CHIEF COMPLAINT       Chief Complaint   Patient presents with    Dental Pain           Facial Swelling       HISTORY OF PRESENT ILLNESS: 1 or more Elements     {History from (Optional):81004}    {Limitations to history (Optional):20837}    Lali Cazares is a 48 y.o. female who presents ***    Nursing Notes were all reviewed and agreed with or any disagreements were addressed in the HPI.    REVIEW OF SYSTEMS :      Review of Systems    Positives and Pertinent negatives as per HPI.     SURGICAL HISTORY     Past Surgical History:   Procedure Laterality Date    ABSCESS DRAINAGE Left 2017    I&D Left forearm deep abscess, VAC application    BREAST SURGERY       SECTION      UPPER GASTROINTESTINAL ENDOSCOPY N/A 2024    EGD BIOPSY performed by Raúl Tello MD at [facility] ENDOSCOPY       CURRENTMEDICATIONS       Discharge Medication List as of 2024 10:30 PM        CONTINUE these medications which have NOT CHANGED    Details   amiodarone (CORDARONE) 200 MG tablet Take 1 tablet by mouth daily, Disp-30 tablet, R-0Normal      gabapentin (NEURONTIN) 300 MG capsule Take 1 capsule by mouth in the morning, at noon, and at bedtime for 30 days., Disp-90 capsule, R-0Normal      buprenorphine-naloxone (SUBOXONE) 8-2 MG SUBL SL tablet Place 1 tablet under the tongue in the morning and at bedtime. Max Daily Amount: 2 tabletsHistorical Med      buprenorphine-naloxone (SUBOXONE) 2-0.5 MG SUBL Place 1 tablet under the tongue daily. With first dose of 8-2Historical Med      hydrOXYzine HCl (ATARAX) 25 MG tablet TAKE ONE TABLET BY MOUTH EVERY 8 HOURS AS NEEDED FOR ITCHING,

## 2024-02-27 NOTE — DISCHARGE INSTRUCTIONS
Return to the emergency department for new or worsening symptoms including, not limited to, developing nausea, vomiting, fever, chills, sweats, diarrhea, throat closing sensation, shortness of breath, blue lips, inability to handle own secretions/saliva, or other symptoms/concerns.    Medications as prescribed ensuring that you eat prior to taking the ibuprofen as this is an NSAID medication that can otherwise cause gastrointestinal bleeding/stomach ulcers if taken on empty stomach.    Ensure that you complete the full course of the antibiotic-penicillin without missing doses.  Do not drink alcohol while taking antibiotics this reduces the effectiveness of the drug and you will not clear the infection.    Follow-up with a dentist from the provided dental clinic list.        Toothache    Toothaches are generally caused by tooth decay.  If you have severe pain or swelling around a tooth, you may have a deep tooth infection.  Tooth decay and infections require evaluation and treatment by a dentist or an oral surgeon.    The emergency department will provide you with the best possible care available for your dental problem.  Unfortunately, there is not a dentist or dental clinic available in the department.  Routine dental care, such as fillings, tooth extractions, or briseida canals are not available in the emergency department.  However, we are avaialbe for emergencies including abscesses, fractures of the jaw and other oral trauma such as a tooth that is knocked out.  Any other dental problem is best treated by a dentist.  Please see the list of dental clinics in the area if you do not currently have a dentist.      Treatment That Can Be Provided for Toothache in the Emergency Department    If you have a severe toothache, medication may be prescribed for you until you are able to see a dentist.  If you are given an antibiotic, take it as prescribed and continue to take it until gone.  Even if you start to feel better,

## 2024-02-27 NOTE — ED TRIAGE NOTES
Patient to the ER with complaints of left sided dental pain and left facial swelling that started 2 days ago. She also reports mouth sores throughout her mouth.  Patient has been taking 800 mg Ibuprofen with minimal relief.

## 2024-03-05 ASSESSMENT — ENCOUNTER SYMPTOMS
COUGH: 0
NAUSEA: 0
SHORTNESS OF BREATH: 0
FACIAL SWELLING: 1
SORE THROAT: 0
ANAL BLEEDING: 0
EYE PAIN: 0
ABDOMINAL PAIN: 0
VOMITING: 0
DIARRHEA: 1
BACK PAIN: 0

## 2024-03-10 PROBLEM — R79.89 ELEVATED TROPONIN: Status: RESOLVED | Noted: 2024-02-09 | Resolved: 2024-03-10

## 2024-03-22 ENCOUNTER — APPOINTMENT (OUTPATIENT)
Dept: GENERAL RADIOLOGY | Age: 49
DRG: 816 | End: 2024-03-22
Payer: COMMERCIAL

## 2024-03-22 ENCOUNTER — HOSPITAL ENCOUNTER (INPATIENT)
Age: 49
LOS: 10 days | Discharge: HOME | DRG: 816 | End: 2024-04-01
Attending: EMERGENCY MEDICINE | Admitting: INTERNAL MEDICINE
Payer: COMMERCIAL

## 2024-03-22 DIAGNOSIS — E87.6 HYPOKALEMIA: ICD-10-CM

## 2024-03-22 DIAGNOSIS — J18.9 PNEUMONIA DUE TO INFECTIOUS ORGANISM, UNSPECIFIED LATERALITY, UNSPECIFIED PART OF LUNG: ICD-10-CM

## 2024-03-22 DIAGNOSIS — I47.20 V-TACH (HCC): ICD-10-CM

## 2024-03-22 DIAGNOSIS — U07.1 COVID: Primary | ICD-10-CM

## 2024-03-22 LAB
ALBUMIN SERPL-MCNC: 2.6 G/DL (ref 3.4–5)
ALP SERPL-CCNC: 153 U/L (ref 40–129)
ALT SERPL-CCNC: 19 U/L (ref 10–40)
ANION GAP SERPL CALCULATED.3IONS-SCNC: 10 MMOL/L (ref 3–16)
ANISOCYTOSIS BLD QL SMEAR: ABNORMAL
AST SERPL-CCNC: 43 U/L (ref 15–37)
BASE EXCESS BLDV CALC-SCNC: 16.2 MMOL/L
BASOPHILS # BLD: 0 K/UL (ref 0–0.2)
BASOPHILS NFR BLD: 0.3 %
BILIRUB DIRECT SERPL-MCNC: 0.7 MG/DL (ref 0–0.3)
BILIRUB INDIRECT SERPL-MCNC: 0.8 MG/DL (ref 0–1)
BILIRUB SERPL-MCNC: 1.5 MG/DL (ref 0–1)
BILIRUB UR QL STRIP.AUTO: NEGATIVE
BUN SERPL-MCNC: 4 MG/DL (ref 7–20)
CALCIUM SERPL-MCNC: 7.8 MG/DL (ref 8.3–10.6)
CHLORIDE SERPL-SCNC: 90 MMOL/L (ref 99–110)
CLARITY UR: CLEAR
CO2 BLDV-SCNC: 42 MMOL/L
CO2 SERPL-SCNC: 34 MMOL/L (ref 21–32)
COHGB MFR BLDV: 3 %
COLOR UR: YELLOW
CREAT SERPL-MCNC: <0.5 MG/DL (ref 0.6–1.1)
DEPRECATED RDW RBC AUTO: 23.6 % (ref 12.4–15.4)
EOSINOPHIL # BLD: 0 K/UL (ref 0–0.6)
EOSINOPHIL NFR BLD: 0.4 %
ETHANOLAMINE SERPL-MCNC: NORMAL MG/DL (ref 0–0.08)
FLUAV RNA UPPER RESP QL NAA+PROBE: NEGATIVE
FLUBV AG NPH QL: NEGATIVE
GFR SERPLBLD CREATININE-BSD FMLA CKD-EPI: >60 ML/MIN/{1.73_M2}
GLUCOSE SERPL-MCNC: 138 MG/DL (ref 70–99)
GLUCOSE UR STRIP.AUTO-MCNC: NEGATIVE MG/DL
HCG SERPL QL: NEGATIVE
HCO3 BLDV-SCNC: 41 MMOL/L (ref 23–29)
HCT VFR BLD AUTO: 29.7 % (ref 36–48)
HGB BLD-MCNC: 9.8 G/DL (ref 12–16)
HGB UR QL STRIP.AUTO: NEGATIVE
INR PPP: 1.34 (ref 0.84–1.16)
KETONES UR STRIP.AUTO-MCNC: NEGATIVE MG/DL
LEUKOCYTE ESTERASE UR QL STRIP.AUTO: NEGATIVE
LIPASE SERPL-CCNC: 11 U/L (ref 13–60)
LYMPHOCYTES # BLD: 1 K/UL (ref 1–5.1)
LYMPHOCYTES NFR BLD: 11.8 %
MAGNESIUM SERPL-MCNC: 1.2 MG/DL (ref 1.8–2.4)
MCH RBC QN AUTO: 28 PG (ref 26–34)
MCHC RBC AUTO-ENTMCNC: 32.9 G/DL (ref 31–36)
MCV RBC AUTO: 85 FL (ref 80–100)
METHGB MFR BLDV: 0.6 %
MONOCYTES # BLD: 0.7 K/UL (ref 0–1.3)
MONOCYTES NFR BLD: 8.9 %
NEUTROPHILS # BLD: 6.4 K/UL (ref 1.7–7.7)
NEUTROPHILS NFR BLD: 78.6 %
NITRITE UR QL STRIP.AUTO: NEGATIVE
NT-PROBNP SERPL-MCNC: 1309 PG/ML (ref 0–124)
O2 THERAPY: ABNORMAL
PCO2 BLDV: 47.8 MMHG (ref 40–50)
PH BLDV: 7.54 [PH] (ref 7.35–7.45)
PH UR STRIP.AUTO: 8 [PH] (ref 5–8)
PLATELET # BLD AUTO: 58 K/UL (ref 135–450)
PMV BLD AUTO: 7.6 FL (ref 5–10.5)
PO2 BLDV: 37 MMHG
POTASSIUM SERPL-SCNC: 2.1 MMOL/L (ref 3.5–5.1)
PROT SERPL-MCNC: 5.2 G/DL (ref 6.4–8.2)
PROT UR STRIP.AUTO-MCNC: NEGATIVE MG/DL
PROTHROMBIN TIME: 16.6 SEC (ref 11.5–14.8)
RBC # BLD AUTO: 3.49 M/UL (ref 4–5.2)
SALICYLATES SERPL-MCNC: <0.3 MG/DL (ref 15–30)
SAO2 % BLDV: 69 %
SARS-COV-2 RDRP RESP QL NAA+PROBE: DETECTED
SODIUM SERPL-SCNC: 134 MMOL/L (ref 136–145)
SP GR UR STRIP.AUTO: 1.01 (ref 1–1.03)
TROPONIN, HIGH SENSITIVITY: 11 NG/L (ref 0–14)
UA COMPLETE W REFLEX CULTURE PNL UR: ABNORMAL
UA DIPSTICK W REFLEX MICRO PNL UR: ABNORMAL
URN SPEC COLLECT METH UR: ABNORMAL
UROBILINOGEN UR STRIP-ACNC: 2 E.U./DL
WBC # BLD AUTO: 8.2 K/UL (ref 4–11)

## 2024-03-22 PROCEDURE — 84703 CHORIONIC GONADOTROPIN ASSAY: CPT

## 2024-03-22 PROCEDURE — 83880 ASSAY OF NATRIURETIC PEPTIDE: CPT

## 2024-03-22 PROCEDURE — 84484 ASSAY OF TROPONIN QUANT: CPT

## 2024-03-22 PROCEDURE — 80179 DRUG ASSAY SALICYLATE: CPT

## 2024-03-22 PROCEDURE — 80076 HEPATIC FUNCTION PANEL: CPT

## 2024-03-22 PROCEDURE — 71045 X-RAY EXAM CHEST 1 VIEW: CPT

## 2024-03-22 PROCEDURE — 85379 FIBRIN DEGRADATION QUANT: CPT

## 2024-03-22 PROCEDURE — 82077 ASSAY SPEC XCP UR&BREATH IA: CPT

## 2024-03-22 PROCEDURE — 87635 SARS-COV-2 COVID-19 AMP PRB: CPT

## 2024-03-22 PROCEDURE — 2580000003 HC RX 258: Performed by: EMERGENCY MEDICINE

## 2024-03-22 PROCEDURE — 93005 ELECTROCARDIOGRAM TRACING: CPT | Performed by: EMERGENCY MEDICINE

## 2024-03-22 PROCEDURE — 83690 ASSAY OF LIPASE: CPT

## 2024-03-22 PROCEDURE — 6360000002 HC RX W HCPCS: Performed by: EMERGENCY MEDICINE

## 2024-03-22 PROCEDURE — 6370000000 HC RX 637 (ALT 250 FOR IP): Performed by: EMERGENCY MEDICINE

## 2024-03-22 PROCEDURE — 80048 BASIC METABOLIC PNL TOTAL CA: CPT

## 2024-03-22 PROCEDURE — 96367 TX/PROPH/DG ADDL SEQ IV INF: CPT

## 2024-03-22 PROCEDURE — 80307 DRUG TEST PRSMV CHEM ANLYZR: CPT

## 2024-03-22 PROCEDURE — 83735 ASSAY OF MAGNESIUM: CPT

## 2024-03-22 PROCEDURE — 87804 INFLUENZA ASSAY W/OPTIC: CPT

## 2024-03-22 PROCEDURE — 2500000003 HC RX 250 WO HCPCS: Performed by: EMERGENCY MEDICINE

## 2024-03-22 PROCEDURE — 99285 EMERGENCY DEPT VISIT HI MDM: CPT

## 2024-03-22 PROCEDURE — 96365 THER/PROPH/DIAG IV INF INIT: CPT

## 2024-03-22 PROCEDURE — 85025 COMPLETE CBC W/AUTO DIFF WBC: CPT

## 2024-03-22 PROCEDURE — 81003 URINALYSIS AUTO W/O SCOPE: CPT

## 2024-03-22 PROCEDURE — 96375 TX/PRO/DX INJ NEW DRUG ADDON: CPT

## 2024-03-22 PROCEDURE — 82803 BLOOD GASES ANY COMBINATION: CPT

## 2024-03-22 PROCEDURE — 85610 PROTHROMBIN TIME: CPT

## 2024-03-22 PROCEDURE — 2000000000 HC ICU R&B

## 2024-03-22 RX ORDER — DEXAMETHASONE SODIUM PHOSPHATE 4 MG/ML
10 INJECTION, SOLUTION INTRA-ARTICULAR; INTRALESIONAL; INTRAMUSCULAR; INTRAVENOUS; SOFT TISSUE ONCE
Status: COMPLETED | OUTPATIENT
Start: 2024-03-22 | End: 2024-03-22

## 2024-03-22 RX ORDER — 0.9 % SODIUM CHLORIDE 0.9 %
1000 INTRAVENOUS SOLUTION INTRAVENOUS ONCE
Status: COMPLETED | OUTPATIENT
Start: 2024-03-22 | End: 2024-03-23

## 2024-03-22 RX ORDER — MAGNESIUM SULFATE IN WATER 40 MG/ML
2000 INJECTION, SOLUTION INTRAVENOUS ONCE
Status: COMPLETED | OUTPATIENT
Start: 2024-03-22 | End: 2024-03-22

## 2024-03-22 RX ORDER — ONDANSETRON 2 MG/ML
4 INJECTION INTRAMUSCULAR; INTRAVENOUS ONCE
Status: COMPLETED | OUTPATIENT
Start: 2024-03-22 | End: 2024-03-22

## 2024-03-22 RX ORDER — MAGNESIUM SULFATE IN WATER 40 MG/ML
2000 INJECTION, SOLUTION INTRAVENOUS ONCE
Status: DISCONTINUED | OUTPATIENT
Start: 2024-03-22 | End: 2024-03-22

## 2024-03-22 RX ORDER — SODIUM CHLORIDE AND POTASSIUM CHLORIDE 300; 900 MG/100ML; MG/100ML
INJECTION, SOLUTION INTRAVENOUS CONTINUOUS
Status: DISCONTINUED | OUTPATIENT
Start: 2024-03-22 | End: 2024-03-23

## 2024-03-22 RX ORDER — POTASSIUM CHLORIDE 750 MG/1
40 TABLET, FILM COATED, EXTENDED RELEASE ORAL ONCE
Status: COMPLETED | OUTPATIENT
Start: 2024-03-22 | End: 2024-03-22

## 2024-03-22 RX ADMIN — AMIODARONE HYDROCHLORIDE 1 MG/MIN: 50 INJECTION, SOLUTION INTRAVENOUS at 23:59

## 2024-03-22 RX ADMIN — AMIODARONE HYDROCHLORIDE 150 MG: 1.5 INJECTION, SOLUTION INTRAVENOUS at 23:26

## 2024-03-22 RX ADMIN — POTASSIUM CHLORIDE AND SODIUM CHLORIDE: 900; 300 INJECTION, SOLUTION INTRAVENOUS at 23:59

## 2024-03-22 RX ADMIN — ONDANSETRON 4 MG: 2 INJECTION INTRAMUSCULAR; INTRAVENOUS at 22:57

## 2024-03-22 RX ADMIN — POTASSIUM CHLORIDE 40 MEQ: 750 TABLET, EXTENDED RELEASE ORAL at 23:43

## 2024-03-22 RX ADMIN — MAGNESIUM SULFATE HEPTAHYDRATE 2000 MG: 40 INJECTION, SOLUTION INTRAVENOUS at 23:06

## 2024-03-22 RX ADMIN — WATER 1000 MG: 1 INJECTION INTRAMUSCULAR; INTRAVENOUS; SUBCUTANEOUS at 23:49

## 2024-03-22 RX ADMIN — SODIUM CHLORIDE 1000 ML: 9 INJECTION, SOLUTION INTRAVENOUS at 22:57

## 2024-03-22 RX ADMIN — DEXAMETHASONE SODIUM PHOSPHATE 10 MG: 4 INJECTION, SOLUTION INTRAMUSCULAR; INTRAVENOUS at 23:47

## 2024-03-22 ASSESSMENT — PAIN SCALES - GENERAL: PAINLEVEL_OUTOF10: 9

## 2024-03-22 ASSESSMENT — PAIN - FUNCTIONAL ASSESSMENT: PAIN_FUNCTIONAL_ASSESSMENT: 0-10

## 2024-03-23 ENCOUNTER — APPOINTMENT (OUTPATIENT)
Dept: CT IMAGING | Age: 49
DRG: 816 | End: 2024-03-23
Payer: COMMERCIAL

## 2024-03-23 PROBLEM — E83.42 HYPOMAGNESEMIA: Status: ACTIVE | Noted: 2024-03-23

## 2024-03-23 LAB
AMPHETAMINES UR QL SCN>1000 NG/ML: ABNORMAL
ANION GAP SERPL CALCULATED.3IONS-SCNC: 11 MMOL/L (ref 3–16)
ANION GAP SERPL CALCULATED.3IONS-SCNC: 8 MMOL/L (ref 3–16)
BARBITURATES UR QL SCN>200 NG/ML: ABNORMAL
BENZODIAZ UR QL SCN>200 NG/ML: ABNORMAL
BUN SERPL-MCNC: 4 MG/DL (ref 7–20)
BUN SERPL-MCNC: 7 MG/DL (ref 7–20)
CALCIUM SERPL-MCNC: 7.6 MG/DL (ref 8.3–10.6)
CALCIUM SERPL-MCNC: 7.9 MG/DL (ref 8.3–10.6)
CANNABINOIDS UR QL SCN>50 NG/ML: ABNORMAL
CHLORIDE SERPL-SCNC: 94 MMOL/L (ref 99–110)
CHLORIDE SERPL-SCNC: 98 MMOL/L (ref 99–110)
CO2 SERPL-SCNC: 31 MMOL/L (ref 21–32)
CO2 SERPL-SCNC: 32 MMOL/L (ref 21–32)
COCAINE UR QL SCN: POSITIVE
CREAT SERPL-MCNC: 0.5 MG/DL (ref 0.6–1.1)
CREAT SERPL-MCNC: <0.5 MG/DL (ref 0.6–1.1)
D DIMER: 2.16 UG/ML FEU (ref 0–0.6)
DEPRECATED RDW RBC AUTO: 24.3 % (ref 12.4–15.4)
DRUG SCREEN COMMENT UR-IMP: ABNORMAL
FENTANYL SCREEN, URINE: ABNORMAL
GFR SERPLBLD CREATININE-BSD FMLA CKD-EPI: >60 ML/MIN/{1.73_M2}
GFR SERPLBLD CREATININE-BSD FMLA CKD-EPI: >60 ML/MIN/{1.73_M2}
GLUCOSE SERPL-MCNC: 149 MG/DL (ref 70–99)
GLUCOSE SERPL-MCNC: 162 MG/DL (ref 70–99)
HCT VFR BLD AUTO: 31.4 % (ref 36–48)
HGB BLD-MCNC: 10.5 G/DL (ref 12–16)
MAGNESIUM SERPL-MCNC: 1.6 MG/DL (ref 1.8–2.4)
MCH RBC QN AUTO: 27.9 PG (ref 26–34)
MCHC RBC AUTO-ENTMCNC: 33.3 G/DL (ref 31–36)
MCV RBC AUTO: 83.9 FL (ref 80–100)
METHADONE UR QL SCN>300 NG/ML: ABNORMAL
OPIATES UR QL SCN>300 NG/ML: ABNORMAL
OXYCODONE UR QL SCN: ABNORMAL
PCP UR QL SCN>25 NG/ML: ABNORMAL
PH UR STRIP: 8 [PH]
PHOSPHATE SERPL-MCNC: 3.1 MG/DL (ref 2.5–4.9)
PLATELET # BLD AUTO: 52 K/UL (ref 135–450)
PMV BLD AUTO: 7.7 FL (ref 5–10.5)
POTASSIUM SERPL-SCNC: 3.2 MMOL/L (ref 3.5–5.1)
POTASSIUM SERPL-SCNC: 3.4 MMOL/L (ref 3.5–5.1)
PROCALCITONIN SERPL IA-MCNC: 0.09 NG/ML (ref 0–0.15)
RBC # BLD AUTO: 3.74 M/UL (ref 4–5.2)
SODIUM SERPL-SCNC: 136 MMOL/L (ref 136–145)
SODIUM SERPL-SCNC: 138 MMOL/L (ref 136–145)
WBC # BLD AUTO: 8.3 K/UL (ref 4–11)

## 2024-03-23 PROCEDURE — 71260 CT THORAX DX C+: CPT

## 2024-03-23 PROCEDURE — 80048 BASIC METABOLIC PNL TOTAL CA: CPT

## 2024-03-23 PROCEDURE — 6370000000 HC RX 637 (ALT 250 FOR IP): Performed by: STUDENT IN AN ORGANIZED HEALTH CARE EDUCATION/TRAINING PROGRAM

## 2024-03-23 PROCEDURE — 6360000002 HC RX W HCPCS: Performed by: INTERNAL MEDICINE

## 2024-03-23 PROCEDURE — 94669 MECHANICAL CHEST WALL OSCILL: CPT

## 2024-03-23 PROCEDURE — 99291 CRITICAL CARE FIRST HOUR: CPT | Performed by: INTERNAL MEDICINE

## 2024-03-23 PROCEDURE — 36415 COLL VENOUS BLD VENIPUNCTURE: CPT

## 2024-03-23 PROCEDURE — 94761 N-INVAS EAR/PLS OXIMETRY MLT: CPT

## 2024-03-23 PROCEDURE — 6360000002 HC RX W HCPCS: Performed by: EMERGENCY MEDICINE

## 2024-03-23 PROCEDURE — 94150 VITAL CAPACITY TEST: CPT

## 2024-03-23 PROCEDURE — 6370000000 HC RX 637 (ALT 250 FOR IP): Performed by: INTERNAL MEDICINE

## 2024-03-23 PROCEDURE — 6360000002 HC RX W HCPCS: Performed by: REGISTERED NURSE

## 2024-03-23 PROCEDURE — 83735 ASSAY OF MAGNESIUM: CPT

## 2024-03-23 PROCEDURE — 2700000000 HC OXYGEN THERAPY PER DAY

## 2024-03-23 PROCEDURE — 6360000002 HC RX W HCPCS: Performed by: NURSE PRACTITIONER

## 2024-03-23 PROCEDURE — 2580000003 HC RX 258: Performed by: NURSE PRACTITIONER

## 2024-03-23 PROCEDURE — 36569 INSJ PICC 5 YR+ W/O IMAGING: CPT

## 2024-03-23 PROCEDURE — 2580000003 HC RX 258: Performed by: EMERGENCY MEDICINE

## 2024-03-23 PROCEDURE — 94640 AIRWAY INHALATION TREATMENT: CPT

## 2024-03-23 PROCEDURE — 2500000003 HC RX 250 WO HCPCS: Performed by: NURSE PRACTITIONER

## 2024-03-23 PROCEDURE — 2060000000 HC ICU INTERMEDIATE R&B

## 2024-03-23 PROCEDURE — 02H633Z INSERTION OF INFUSION DEVICE INTO RIGHT ATRIUM, PERCUTANEOUS APPROACH: ICD-10-PCS | Performed by: STUDENT IN AN ORGANIZED HEALTH CARE EDUCATION/TRAINING PROGRAM

## 2024-03-23 PROCEDURE — 6360000004 HC RX CONTRAST MEDICATION: Performed by: INTERNAL MEDICINE

## 2024-03-23 PROCEDURE — C1769 GUIDE WIRE: HCPCS

## 2024-03-23 PROCEDURE — 2580000003 HC RX 258: Performed by: INTERNAL MEDICINE

## 2024-03-23 PROCEDURE — 84145 PROCALCITONIN (PCT): CPT

## 2024-03-23 PROCEDURE — 85027 COMPLETE CBC AUTOMATED: CPT

## 2024-03-23 PROCEDURE — 84100 ASSAY OF PHOSPHORUS: CPT

## 2024-03-23 RX ORDER — LORAZEPAM 2 MG/ML
1 INJECTION INTRAMUSCULAR ONCE
Status: COMPLETED | OUTPATIENT
Start: 2024-03-23 | End: 2024-03-23

## 2024-03-23 RX ORDER — BUPRENORPHINE HYDROCHLORIDE AND NALOXONE HYDROCHLORIDE DIHYDRATE 2; .5 MG/1; MG/1
1 TABLET SUBLINGUAL DAILY
Status: DISCONTINUED | OUTPATIENT
Start: 2024-03-23 | End: 2024-04-01 | Stop reason: HOSPADM

## 2024-03-23 RX ORDER — ASPIRIN 81 MG/1
81 TABLET, CHEWABLE ORAL DAILY
Status: DISCONTINUED | OUTPATIENT
Start: 2024-03-23 | End: 2024-04-01 | Stop reason: HOSPADM

## 2024-03-23 RX ORDER — PANTOPRAZOLE SODIUM 40 MG/1
40 TABLET, DELAYED RELEASE ORAL
Status: DISCONTINUED | OUTPATIENT
Start: 2024-03-23 | End: 2024-04-01 | Stop reason: HOSPADM

## 2024-03-23 RX ORDER — MAGNESIUM HYDROXIDE/ALUMINUM HYDROXICE/SIMETHICONE 120; 1200; 1200 MG/30ML; MG/30ML; MG/30ML
30 SUSPENSION ORAL EVERY 6 HOURS PRN
Status: DISCONTINUED | OUTPATIENT
Start: 2024-03-23 | End: 2024-04-01 | Stop reason: HOSPADM

## 2024-03-23 RX ORDER — ALBUTEROL SULFATE 90 UG/1
2 AEROSOL, METERED RESPIRATORY (INHALATION) EVERY 4 HOURS PRN
Status: DISCONTINUED | OUTPATIENT
Start: 2024-03-23 | End: 2024-04-01 | Stop reason: HOSPADM

## 2024-03-23 RX ORDER — MAGNESIUM SULFATE IN WATER 40 MG/ML
4000 INJECTION, SOLUTION INTRAVENOUS ONCE
Status: COMPLETED | OUTPATIENT
Start: 2024-03-23 | End: 2024-03-23

## 2024-03-23 RX ORDER — DEXAMETHASONE 4 MG/1
6 TABLET ORAL DAILY
Status: DISCONTINUED | OUTPATIENT
Start: 2024-03-23 | End: 2024-03-24

## 2024-03-23 RX ORDER — ACETAMINOPHEN 325 MG/1
650 TABLET ORAL EVERY 6 HOURS PRN
Status: DISCONTINUED | OUTPATIENT
Start: 2024-03-23 | End: 2024-04-01 | Stop reason: HOSPADM

## 2024-03-23 RX ORDER — CALCIUM GLUCONATE 20 MG/ML
2000 INJECTION, SOLUTION INTRAVENOUS ONCE
Status: COMPLETED | OUTPATIENT
Start: 2024-03-23 | End: 2024-03-23

## 2024-03-23 RX ORDER — SODIUM CHLORIDE 0.9 % (FLUSH) 0.9 %
5-40 SYRINGE (ML) INJECTION PRN
Status: DISCONTINUED | OUTPATIENT
Start: 2024-03-23 | End: 2024-03-28 | Stop reason: SDUPTHER

## 2024-03-23 RX ORDER — LIDOCAINE HYDROCHLORIDE 10 MG/ML
5 INJECTION, SOLUTION EPIDURAL; INFILTRATION; INTRACAUDAL; PERINEURAL ONCE
Status: COMPLETED | OUTPATIENT
Start: 2024-03-23 | End: 2024-03-23

## 2024-03-23 RX ORDER — HYDROXYZINE HYDROCHLORIDE 25 MG/1
25 TABLET, FILM COATED ORAL 3 TIMES DAILY PRN
Status: DISCONTINUED | OUTPATIENT
Start: 2024-03-23 | End: 2024-04-01 | Stop reason: HOSPADM

## 2024-03-23 RX ORDER — POTASSIUM CHLORIDE 7.45 MG/ML
10 INJECTION INTRAVENOUS
Status: COMPLETED | OUTPATIENT
Start: 2024-03-23 | End: 2024-03-23

## 2024-03-23 RX ORDER — SODIUM CHLORIDE 0.9 % (FLUSH) 0.9 %
5-40 SYRINGE (ML) INJECTION EVERY 12 HOURS SCHEDULED
Status: DISCONTINUED | OUTPATIENT
Start: 2024-03-23 | End: 2024-03-28 | Stop reason: SDUPTHER

## 2024-03-23 RX ORDER — SODIUM CHLORIDE, SODIUM LACTATE, POTASSIUM CHLORIDE, CALCIUM CHLORIDE 600; 310; 30; 20 MG/100ML; MG/100ML; MG/100ML; MG/100ML
INJECTION, SOLUTION INTRAVENOUS CONTINUOUS
Status: DISCONTINUED | OUTPATIENT
Start: 2024-03-23 | End: 2024-03-23

## 2024-03-23 RX ORDER — ERGOCALCIFEROL 1.25 MG/1
50000 CAPSULE ORAL WEEKLY
Status: DISCONTINUED | OUTPATIENT
Start: 2024-03-29 | End: 2024-04-01 | Stop reason: HOSPADM

## 2024-03-23 RX ORDER — POTASSIUM CHLORIDE 7.45 MG/ML
10 INJECTION INTRAVENOUS PRN
Status: DISCONTINUED | OUTPATIENT
Start: 2024-03-23 | End: 2024-04-01 | Stop reason: HOSPADM

## 2024-03-23 RX ORDER — POLYETHYLENE GLYCOL 3350 17 G/17G
17 POWDER, FOR SOLUTION ORAL DAILY PRN
Status: DISCONTINUED | OUTPATIENT
Start: 2024-03-23 | End: 2024-04-01 | Stop reason: HOSPADM

## 2024-03-23 RX ORDER — IPRATROPIUM BROMIDE AND ALBUTEROL SULFATE 2.5; .5 MG/3ML; MG/3ML
1 SOLUTION RESPIRATORY (INHALATION)
Status: DISCONTINUED | OUTPATIENT
Start: 2024-03-23 | End: 2024-03-23

## 2024-03-23 RX ORDER — SODIUM CHLORIDE 9 MG/ML
25 INJECTION, SOLUTION INTRAVENOUS PRN
Status: DISCONTINUED | OUTPATIENT
Start: 2024-03-23 | End: 2024-04-01 | Stop reason: HOSPADM

## 2024-03-23 RX ORDER — ENOXAPARIN SODIUM 100 MG/ML
30 INJECTION SUBCUTANEOUS 2 TIMES DAILY
Status: DISCONTINUED | OUTPATIENT
Start: 2024-03-23 | End: 2024-04-01 | Stop reason: HOSPADM

## 2024-03-23 RX ORDER — PROCHLORPERAZINE EDISYLATE 5 MG/ML
10 INJECTION INTRAMUSCULAR; INTRAVENOUS EVERY 6 HOURS PRN
Status: DISCONTINUED | OUTPATIENT
Start: 2024-03-23 | End: 2024-04-01 | Stop reason: HOSPADM

## 2024-03-23 RX ORDER — LEVOTHYROXINE SODIUM 0.05 MG/1
50 TABLET ORAL
Status: DISCONTINUED | OUTPATIENT
Start: 2024-03-23 | End: 2024-04-01 | Stop reason: HOSPADM

## 2024-03-23 RX ORDER — ESCITALOPRAM OXALATE 10 MG/1
20 TABLET ORAL DAILY
Status: DISCONTINUED | OUTPATIENT
Start: 2024-03-23 | End: 2024-04-01 | Stop reason: HOSPADM

## 2024-03-23 RX ORDER — MAGNESIUM SULFATE IN WATER 40 MG/ML
2000 INJECTION, SOLUTION INTRAVENOUS PRN
Status: DISCONTINUED | OUTPATIENT
Start: 2024-03-23 | End: 2024-04-01 | Stop reason: HOSPADM

## 2024-03-23 RX ORDER — GABAPENTIN 300 MG/1
300 CAPSULE ORAL 3 TIMES DAILY
Status: DISCONTINUED | OUTPATIENT
Start: 2024-03-23 | End: 2024-04-01 | Stop reason: HOSPADM

## 2024-03-23 RX ORDER — POTASSIUM CHLORIDE 20 MEQ/1
40 TABLET, EXTENDED RELEASE ORAL PRN
Status: DISCONTINUED | OUTPATIENT
Start: 2024-03-23 | End: 2024-04-01 | Stop reason: HOSPADM

## 2024-03-23 RX ORDER — SODIUM CHLORIDE 9 MG/ML
INJECTION, SOLUTION INTRAVENOUS PRN
Status: DISCONTINUED | OUTPATIENT
Start: 2024-03-23 | End: 2024-03-28 | Stop reason: SDUPTHER

## 2024-03-23 RX ORDER — VALACYCLOVIR HYDROCHLORIDE 500 MG/1
500 TABLET, FILM COATED ORAL DAILY
Status: DISCONTINUED | OUTPATIENT
Start: 2024-03-23 | End: 2024-04-01 | Stop reason: HOSPADM

## 2024-03-23 RX ORDER — MORPHINE SULFATE 2 MG/ML
2 INJECTION, SOLUTION INTRAMUSCULAR; INTRAVENOUS EVERY 4 HOURS PRN
Status: DISCONTINUED | OUTPATIENT
Start: 2024-03-23 | End: 2024-03-30

## 2024-03-23 RX ORDER — MORPHINE SULFATE 2 MG/ML
2 INJECTION, SOLUTION INTRAMUSCULAR; INTRAVENOUS ONCE
Status: COMPLETED | OUTPATIENT
Start: 2024-03-23 | End: 2024-03-23

## 2024-03-23 RX ORDER — ACETAMINOPHEN 650 MG/1
650 SUPPOSITORY RECTAL EVERY 6 HOURS PRN
Status: DISCONTINUED | OUTPATIENT
Start: 2024-03-23 | End: 2024-04-01 | Stop reason: HOSPADM

## 2024-03-23 RX ORDER — AMIODARONE HYDROCHLORIDE 200 MG/1
200 TABLET ORAL DAILY
Status: DISCONTINUED | OUTPATIENT
Start: 2024-03-23 | End: 2024-04-01 | Stop reason: HOSPADM

## 2024-03-23 RX ADMIN — Medication 1 PUFF: at 11:26

## 2024-03-23 RX ADMIN — ALBUTEROL SULFATE 2 PUFF: 90 AEROSOL, METERED RESPIRATORY (INHALATION) at 03:54

## 2024-03-23 RX ADMIN — SODIUM CHLORIDE, PRESERVATIVE FREE 10 ML: 5 INJECTION INTRAVENOUS at 10:34

## 2024-03-23 RX ADMIN — Medication 1 PUFF: at 16:33

## 2024-03-23 RX ADMIN — ENOXAPARIN SODIUM 30 MG: 100 INJECTION SUBCUTANEOUS at 08:26

## 2024-03-23 RX ADMIN — GABAPENTIN 300 MG: 300 CAPSULE ORAL at 14:05

## 2024-03-23 RX ADMIN — POTASSIUM CHLORIDE 10 MEQ: 7.46 INJECTION, SOLUTION INTRAVENOUS at 08:31

## 2024-03-23 RX ADMIN — Medication 1 PUFF: at 08:20

## 2024-03-23 RX ADMIN — BUPRENORPHINE HYDROCHLORIDE AND NALOXONE HYDROCHLORIDE DIHYDRATE 1 TABLET: 2; .5 TABLET SUBLINGUAL at 08:23

## 2024-03-23 RX ADMIN — SODIUM CHLORIDE, PRESERVATIVE FREE 10 ML: 5 INJECTION INTRAVENOUS at 21:13

## 2024-03-23 RX ADMIN — DEXAMETHASONE 6 MG: 6 TABLET ORAL at 10:31

## 2024-03-23 RX ADMIN — SODIUM CHLORIDE, PRESERVATIVE FREE 10 ML: 5 INJECTION INTRAVENOUS at 21:12

## 2024-03-23 RX ADMIN — POTASSIUM CHLORIDE 10 MEQ: 7.46 INJECTION, SOLUTION INTRAVENOUS at 11:40

## 2024-03-23 RX ADMIN — MORPHINE SULFATE 2 MG: 2 INJECTION, SOLUTION INTRAMUSCULAR; INTRAVENOUS at 03:45

## 2024-03-23 RX ADMIN — ACETAMINOPHEN 325MG 650 MG: 325 TABLET ORAL at 14:05

## 2024-03-23 RX ADMIN — MOMETASONE FUROATE AND FORMOTEROL FUMARATE DIHYDRATE 2 PUFF: 100; 5 AEROSOL RESPIRATORY (INHALATION) at 08:20

## 2024-03-23 RX ADMIN — HYDROXYZINE HYDROCHLORIDE 25 MG: 10 TABLET, FILM COATED ORAL at 03:41

## 2024-03-23 RX ADMIN — POLYETHYLENE GLYCOL 3350 17 G: 17 POWDER, FOR SOLUTION ORAL at 08:31

## 2024-03-23 RX ADMIN — VALACYCLOVIR 500 MG: 500 TABLET, FILM COATED ORAL at 08:44

## 2024-03-23 RX ADMIN — Medication 1 PUFF: at 19:40

## 2024-03-23 RX ADMIN — ASPIRIN 81 MG: 81 TABLET, CHEWABLE ORAL at 08:22

## 2024-03-23 RX ADMIN — LIDOCAINE HYDROCHLORIDE 5 ML: 10 INJECTION, SOLUTION EPIDURAL; INFILTRATION; INTRACAUDAL; PERINEURAL at 10:33

## 2024-03-23 RX ADMIN — AMIODARONE HYDROCHLORIDE 0.5 MG/MIN: 50 INJECTION, SOLUTION INTRAVENOUS at 05:40

## 2024-03-23 RX ADMIN — GABAPENTIN 300 MG: 300 CAPSULE ORAL at 21:12

## 2024-03-23 RX ADMIN — PROCHLORPERAZINE EDISYLATE 10 MG: 5 INJECTION INTRAMUSCULAR; INTRAVENOUS at 14:05

## 2024-03-23 RX ADMIN — IOPAMIDOL 75 ML: 755 INJECTION, SOLUTION INTRAVENOUS at 10:54

## 2024-03-23 RX ADMIN — GABAPENTIN 300 MG: 300 CAPSULE ORAL at 08:22

## 2024-03-23 RX ADMIN — POTASSIUM CHLORIDE 40 MEQ: 1500 TABLET, EXTENDED RELEASE ORAL at 06:07

## 2024-03-23 RX ADMIN — ESCITALOPRAM OXALATE 20 MG: 10 TABLET ORAL at 08:22

## 2024-03-23 RX ADMIN — MOMETASONE FUROATE AND FORMOTEROL FUMARATE DIHYDRATE 2 PUFF: 100; 5 AEROSOL RESPIRATORY (INHALATION) at 19:40

## 2024-03-23 RX ADMIN — ACETAMINOPHEN 325MG 650 MG: 325 TABLET ORAL at 08:44

## 2024-03-23 RX ADMIN — LEVOTHYROXINE SODIUM 50 MCG: 0.05 TABLET ORAL at 05:38

## 2024-03-23 RX ADMIN — MAGNESIUM SULFATE HEPTAHYDRATE 4000 MG: 40 INJECTION, SOLUTION INTRAVENOUS at 08:21

## 2024-03-23 RX ADMIN — CALCIUM GLUCONATE 2000 MG: 20 INJECTION, SOLUTION INTRAVENOUS at 11:02

## 2024-03-23 RX ADMIN — AMIODARONE HYDROCHLORIDE 200 MG: 200 TABLET ORAL at 15:51

## 2024-03-23 RX ADMIN — POTASSIUM CHLORIDE 10 MEQ: 7.46 INJECTION, SOLUTION INTRAVENOUS at 10:31

## 2024-03-23 RX ADMIN — LORAZEPAM 1 MG: 2 INJECTION INTRAMUSCULAR; INTRAVENOUS at 00:23

## 2024-03-23 RX ADMIN — PANTOPRAZOLE SODIUM 40 MG: 40 TABLET, DELAYED RELEASE ORAL at 05:38

## 2024-03-23 ASSESSMENT — PAIN DESCRIPTION - PAIN TYPE: TYPE: ACUTE PAIN

## 2024-03-23 ASSESSMENT — PAIN SCALES - GENERAL
PAINLEVEL_OUTOF10: 8
PAINLEVEL_OUTOF10: 8
PAINLEVEL_OUTOF10: 9
PAINLEVEL_OUTOF10: 8
PAINLEVEL_OUTOF10: 6
PAINLEVEL_OUTOF10: 0
PAINLEVEL_OUTOF10: 0
PAINLEVEL_OUTOF10: 8
PAINLEVEL_OUTOF10: 8

## 2024-03-23 ASSESSMENT — PAIN DESCRIPTION - DESCRIPTORS
DESCRIPTORS: HEAVINESS
DESCRIPTORS: HEAVINESS
DESCRIPTORS: SHARP;TIGHTNESS

## 2024-03-23 ASSESSMENT — PAIN DESCRIPTION - LOCATION
LOCATION: CHEST

## 2024-03-23 ASSESSMENT — PAIN DESCRIPTION - FREQUENCY: FREQUENCY: CONTINUOUS

## 2024-03-23 ASSESSMENT — PAIN DESCRIPTION - ORIENTATION
ORIENTATION: MID
ORIENTATION: MID
ORIENTATION: POSTERIOR;LEFT

## 2024-03-23 ASSESSMENT — PAIN - FUNCTIONAL ASSESSMENT
PAIN_FUNCTIONAL_ASSESSMENT: ACTIVITIES ARE NOT PREVENTED

## 2024-03-23 ASSESSMENT — PAIN DESCRIPTION - ONSET: ONSET: ON-GOING

## 2024-03-23 NOTE — ED PROVIDER NOTES
preliminarily interpreted by the emergency physician with the below findings:    Chest x-ray shows multilobar pneumonia    ED BEDSIDE ULTRASOUND:   Performed by ED Physician - none    LABS:  Labs Reviewed   COVID-19, RAPID - Abnormal; Notable for the following components:       Result Value    SARS-CoV-2, NAAT DETECTED (*)     All other components within normal limits   CBC WITH AUTO DIFFERENTIAL - Abnormal; Notable for the following components:    RBC 3.49 (*)     Hemoglobin 9.8 (*)     Hematocrit 29.7 (*)     RDW 23.6 (*)     Platelets 58 (*)     Anisocytosis 2+ (*)     All other components within normal limits   BASIC METABOLIC PANEL W/ REFLEX TO MG FOR LOW K - Abnormal; Notable for the following components:    Sodium 134 (*)     Potassium reflex Magnesium 2.1 (*)     Chloride 90 (*)     CO2 34 (*)     Glucose 138 (*)     BUN 4 (*)     Creatinine <0.5 (*)     Calcium 7.8 (*)     All other components within normal limits    Narrative:     CALL  [facility] tel. [phone],  Chemistry results called to and read back by rolando hansen rn, 03/22/2024  23:32, by ANNIMA   BRAIN NATRIURETIC PEPTIDE - Abnormal; Notable for the following components:    Pro-BNP 1,309 (*)     All other components within normal limits    Narrative:     CALL  [facility] tel. [phone],  Chemistry results called to and read back by rolando hansen rn, 03/22/2024  23:32, by [facility]   HEPATIC FUNCTION PANEL - Abnormal; Notable for the following components:    Total Protein 5.2 (*)     Albumin 2.6 (*)     Alkaline Phosphatase 153 (*)     AST 43 (*)     Total Bilirubin 1.5 (*)     Bilirubin, Direct 0.7 (*)     All other components within normal limits    Narrative:     CALL  [facility] tel. [phone],  Chemistry results called to and read back by rolando hansen rn, 03/22/2024  23:32, by ANNIMA   PROTIME-INR - Abnormal; Notable for the following components:    Protime 16.6 (*)     INR 1.34 (*)     All other components within normal limits

## 2024-03-23 NOTE — ED NOTES
Pt present to the ED via ems from home c/o cp that started today. Per ems has hx v tach and known cardiac history. Per ems was having some chest tightness. Pt was given 324 Aspirin and one nitro. Pt was satting 88% on RA was placed on 4L , oxygen went up to 94%. Pt satting 89% on RA. Pt states she has been feeling sick for a couple days. Pt AXO 4 , call light within reach.

## 2024-03-23 NOTE — H&P
V2.0  History and Physical      Name:  Lali Cazares /Age/Sex: 1975  (48 y.o. female)   MRN & CSN:  [id number] & [id number] Encounter Date/Time: 3/22/2024 11:41 PM EDT   Location:   PCP: Romi Denis MD       Hospital Day: 1    Assessment and Plan:   Lali Cazares is a 48 y.o. female smoker, alcoholic, polysubstance abuser including IVDU with a pmh of mood disorder, hepatitis C, opioid dependence, V. tach on amiodarone who presents with Ventricular tachycardia (HCC).    Hospital Problems             Last Modified POA    * (Principal) Ventricular tachycardia (HCC) 3/23/2024 Yes    Hypokalemia 3/23/2024 Yes    Prolonged QT interval 3/23/2024 Yes    Hypomagnesemia 3/23/2024 Yes    Polysubstance abuse (HCC) 3/23/2024 Yes   COVID viral infection    Plan:  Start amiodarone drip and, replace potassium magnesium  Monitor in ICU  Avoid QT prolonging agents  Check D-dimer  Droplet plus precautions  Continue home regimen for chronic stable conditions    Disposition:   Current Living situation: Home  Expected Disposition: Home  Estimated D/C: 2-3 days    Diet ADULT DIET; Regular; No Added Salt (3-4 gm); No Caffeine   DVT Prophylaxis [x] Lovenox, []  Heparin, [] SCDs, [] Ambulation,  [] Eliquis, [] Xarelto, [] Coumadin   Code Status Full Code   Surrogate Decision Maker/ POA Mother     Personally reviewed Lab Studies and Imaging     Discussed management of the case with no one who recommended n/a.    EKG interpreted personally and results sinus bradycardia with ventricular rate of 51, QTc of 503, without any acute ischemic changes.    Imaging that was interpreted personally includes chest x-ray and results bilateral groundglass opacities.    Drugs that require monitoring for toxicity include none and the method of monitoring was n/a.        History from:     patient    History of Present Illness:     Chief Complaint: Chest pain  Lali Cazares is a 48 y.o. female smoker, alcoholic,

## 2024-03-23 NOTE — ED NOTES
Report given to ICU RN. She verbalized understanding of patient condition and has no further questions.

## 2024-03-24 LAB
ANION GAP SERPL CALCULATED.3IONS-SCNC: 7 MMOL/L (ref 3–16)
BASOPHILS # BLD: 0 K/UL (ref 0–0.2)
BASOPHILS NFR BLD: 0.1 %
BUN SERPL-MCNC: 12 MG/DL (ref 7–20)
CALCIUM SERPL-MCNC: 8.1 MG/DL (ref 8.3–10.6)
CHLORIDE SERPL-SCNC: 101 MMOL/L (ref 99–110)
CO2 SERPL-SCNC: 34 MMOL/L (ref 21–32)
CREAT SERPL-MCNC: 0.6 MG/DL (ref 0.6–1.1)
CRP SERPL-MCNC: 89.5 MG/L (ref 0–5.1)
DEPRECATED RDW RBC AUTO: 23.9 % (ref 12.4–15.4)
EKG ATRIAL RATE: 51 BPM
EKG ATRIAL RATE: 70 BPM
EKG DIAGNOSIS: NORMAL
EKG DIAGNOSIS: NORMAL
EKG P AXIS: 43 DEGREES
EKG P AXIS: 44 DEGREES
EKG P-R INTERVAL: 118 MS
EKG P-R INTERVAL: 84 MS
EKG Q-T INTERVAL: 546 MS
EKG Q-T INTERVAL: 644 MS
EKG QRS DURATION: 80 MS
EKG QRS DURATION: 92 MS
EKG QTC CALCULATION (BAZETT): 503 MS
EKG QTC CALCULATION (BAZETT): 695 MS
EKG R AXIS: 63 DEGREES
EKG R AXIS: 65 DEGREES
EKG T AXIS: 70 DEGREES
EKG T AXIS: 84 DEGREES
EKG VENTRICULAR RATE: 51 BPM
EKG VENTRICULAR RATE: 70 BPM
EOSINOPHIL # BLD: 0 K/UL (ref 0–0.6)
EOSINOPHIL NFR BLD: 0 %
GFR SERPLBLD CREATININE-BSD FMLA CKD-EPI: >60 ML/MIN/{1.73_M2}
GLUCOSE SERPL-MCNC: 143 MG/DL (ref 70–99)
HCT VFR BLD AUTO: 28.4 % (ref 36–48)
HGB BLD-MCNC: 9.2 G/DL (ref 12–16)
LYMPHOCYTES # BLD: 0.7 K/UL (ref 1–5.1)
LYMPHOCYTES NFR BLD: 5.3 %
MAGNESIUM SERPL-MCNC: 2.2 MG/DL (ref 1.8–2.4)
MCH RBC QN AUTO: 27.6 PG (ref 26–34)
MCHC RBC AUTO-ENTMCNC: 32.4 G/DL (ref 31–36)
MCV RBC AUTO: 85.2 FL (ref 80–100)
MONOCYTES # BLD: 0.7 K/UL (ref 0–1.3)
MONOCYTES NFR BLD: 5.4 %
NEUTROPHILS # BLD: 12.3 K/UL (ref 1.7–7.7)
NEUTROPHILS NFR BLD: 89.2 %
PHOSPHATE SERPL-MCNC: 2.9 MG/DL (ref 2.5–4.9)
PLATELET # BLD AUTO: 80 K/UL (ref 135–450)
PMV BLD AUTO: 8.4 FL (ref 5–10.5)
POTASSIUM SERPL-SCNC: 3.7 MMOL/L (ref 3.5–5.1)
PROCALCITONIN SERPL IA-MCNC: 0.08 NG/ML (ref 0–0.15)
RBC # BLD AUTO: 3.33 M/UL (ref 4–5.2)
SODIUM SERPL-SCNC: 142 MMOL/L (ref 136–145)
WBC # BLD AUTO: 13.8 K/UL (ref 4–11)

## 2024-03-24 PROCEDURE — 85025 COMPLETE CBC W/AUTO DIFF WBC: CPT

## 2024-03-24 PROCEDURE — 99232 SBSQ HOSP IP/OBS MODERATE 35: CPT | Performed by: INTERNAL MEDICINE

## 2024-03-24 PROCEDURE — 6370000000 HC RX 637 (ALT 250 FOR IP): Performed by: INTERNAL MEDICINE

## 2024-03-24 PROCEDURE — 94761 N-INVAS EAR/PLS OXIMETRY MLT: CPT

## 2024-03-24 PROCEDURE — 2580000003 HC RX 258: Performed by: INTERNAL MEDICINE

## 2024-03-24 PROCEDURE — 84100 ASSAY OF PHOSPHORUS: CPT

## 2024-03-24 PROCEDURE — 6370000000 HC RX 637 (ALT 250 FOR IP): Performed by: STUDENT IN AN ORGANIZED HEALTH CARE EDUCATION/TRAINING PROGRAM

## 2024-03-24 PROCEDURE — 2700000000 HC OXYGEN THERAPY PER DAY

## 2024-03-24 PROCEDURE — 6360000002 HC RX W HCPCS: Performed by: INTERNAL MEDICINE

## 2024-03-24 PROCEDURE — 6360000002 HC RX W HCPCS: Performed by: STUDENT IN AN ORGANIZED HEALTH CARE EDUCATION/TRAINING PROGRAM

## 2024-03-24 PROCEDURE — 2060000000 HC ICU INTERMEDIATE R&B

## 2024-03-24 PROCEDURE — 86140 C-REACTIVE PROTEIN: CPT

## 2024-03-24 PROCEDURE — 80048 BASIC METABOLIC PNL TOTAL CA: CPT

## 2024-03-24 PROCEDURE — 94669 MECHANICAL CHEST WALL OSCILL: CPT

## 2024-03-24 PROCEDURE — 84145 PROCALCITONIN (PCT): CPT

## 2024-03-24 PROCEDURE — 94640 AIRWAY INHALATION TREATMENT: CPT

## 2024-03-24 PROCEDURE — 93010 ELECTROCARDIOGRAM REPORT: CPT | Performed by: INTERNAL MEDICINE

## 2024-03-24 PROCEDURE — 83735 ASSAY OF MAGNESIUM: CPT

## 2024-03-24 PROCEDURE — 36415 COLL VENOUS BLD VENIPUNCTURE: CPT

## 2024-03-24 RX ORDER — LORAZEPAM 1 MG/1
2 TABLET ORAL
Status: DISCONTINUED | OUTPATIENT
Start: 2024-03-24 | End: 2024-03-29

## 2024-03-24 RX ORDER — LORAZEPAM 1 MG/1
1 TABLET ORAL
Status: DISCONTINUED | OUTPATIENT
Start: 2024-03-24 | End: 2024-03-29

## 2024-03-24 RX ORDER — LORAZEPAM 2 MG/ML
1 INJECTION INTRAMUSCULAR
Status: DISCONTINUED | OUTPATIENT
Start: 2024-03-24 | End: 2024-03-29

## 2024-03-24 RX ORDER — GAUZE BANDAGE 2" X 2"
100 BANDAGE TOPICAL DAILY
Status: DISCONTINUED | OUTPATIENT
Start: 2024-03-24 | End: 2024-04-01 | Stop reason: HOSPADM

## 2024-03-24 RX ORDER — SODIUM CHLORIDE 0.9 % (FLUSH) 0.9 %
5-40 SYRINGE (ML) INJECTION PRN
Status: DISCONTINUED | OUTPATIENT
Start: 2024-03-24 | End: 2024-04-01 | Stop reason: HOSPADM

## 2024-03-24 RX ORDER — SODIUM CHLORIDE 0.9 % (FLUSH) 0.9 %
5-40 SYRINGE (ML) INJECTION EVERY 12 HOURS SCHEDULED
Status: DISCONTINUED | OUTPATIENT
Start: 2024-03-24 | End: 2024-04-01 | Stop reason: HOSPADM

## 2024-03-24 RX ORDER — LORAZEPAM 1 MG/1
4 TABLET ORAL
Status: DISCONTINUED | OUTPATIENT
Start: 2024-03-24 | End: 2024-03-29

## 2024-03-24 RX ORDER — LORAZEPAM 1 MG/1
3 TABLET ORAL
Status: DISCONTINUED | OUTPATIENT
Start: 2024-03-24 | End: 2024-03-29

## 2024-03-24 RX ORDER — DEXAMETHASONE 4 MG/1
6 TABLET ORAL 2 TIMES DAILY
Status: DISCONTINUED | OUTPATIENT
Start: 2024-03-24 | End: 2024-03-27

## 2024-03-24 RX ORDER — LORAZEPAM 2 MG/ML
2 INJECTION INTRAMUSCULAR
Status: DISCONTINUED | OUTPATIENT
Start: 2024-03-24 | End: 2024-03-29

## 2024-03-24 RX ORDER — LORAZEPAM 2 MG/ML
3 INJECTION INTRAMUSCULAR
Status: DISCONTINUED | OUTPATIENT
Start: 2024-03-24 | End: 2024-03-29

## 2024-03-24 RX ORDER — LORAZEPAM 2 MG/ML
4 INJECTION INTRAMUSCULAR
Status: DISCONTINUED | OUTPATIENT
Start: 2024-03-24 | End: 2024-03-29

## 2024-03-24 RX ORDER — SODIUM CHLORIDE 9 MG/ML
INJECTION, SOLUTION INTRAVENOUS PRN
Status: DISCONTINUED | OUTPATIENT
Start: 2024-03-24 | End: 2024-04-01 | Stop reason: HOSPADM

## 2024-03-24 RX ADMIN — HYDROXYZINE HYDROCHLORIDE 25 MG: 10 TABLET, FILM COATED ORAL at 08:41

## 2024-03-24 RX ADMIN — GABAPENTIN 300 MG: 300 CAPSULE ORAL at 08:41

## 2024-03-24 RX ADMIN — ENOXAPARIN SODIUM 30 MG: 100 INJECTION SUBCUTANEOUS at 21:34

## 2024-03-24 RX ADMIN — DIPHENHYDRAMINE HCL 10 ML: 12.5 SOLUTION ORAL at 11:51

## 2024-03-24 RX ADMIN — PROCHLORPERAZINE EDISYLATE 10 MG: 5 INJECTION INTRAMUSCULAR; INTRAVENOUS at 04:23

## 2024-03-24 RX ADMIN — Medication 1 PUFF: at 20:39

## 2024-03-24 RX ADMIN — MOMETASONE FUROATE AND FORMOTEROL FUMARATE DIHYDRATE 2 PUFF: 100; 5 AEROSOL RESPIRATORY (INHALATION) at 09:15

## 2024-03-24 RX ADMIN — Medication 1 PUFF: at 09:15

## 2024-03-24 RX ADMIN — ESCITALOPRAM OXALATE 20 MG: 10 TABLET ORAL at 08:41

## 2024-03-24 RX ADMIN — LORAZEPAM 4 MG: 2 INJECTION INTRAMUSCULAR; INTRAVENOUS at 13:24

## 2024-03-24 RX ADMIN — Medication 1 PUFF: at 16:35

## 2024-03-24 RX ADMIN — Medication 1 PUFF: at 23:35

## 2024-03-24 RX ADMIN — LORAZEPAM 3 MG: 2 INJECTION INTRAMUSCULAR; INTRAVENOUS at 12:18

## 2024-03-24 RX ADMIN — PANTOPRAZOLE SODIUM 40 MG: 40 TABLET, DELAYED RELEASE ORAL at 06:23

## 2024-03-24 RX ADMIN — Medication 1 PUFF: at 03:36

## 2024-03-24 RX ADMIN — ACETAMINOPHEN 325MG 650 MG: 325 TABLET ORAL at 04:19

## 2024-03-24 RX ADMIN — Medication 1 PUFF: at 00:11

## 2024-03-24 RX ADMIN — LEVOTHYROXINE SODIUM 50 MCG: 0.05 TABLET ORAL at 06:23

## 2024-03-24 RX ADMIN — Medication 1 PUFF: at 12:52

## 2024-03-24 RX ADMIN — SODIUM CHLORIDE, PRESERVATIVE FREE 5 ML: 5 INJECTION INTRAVENOUS at 21:44

## 2024-03-24 RX ADMIN — MOMETASONE FUROATE AND FORMOTEROL FUMARATE DIHYDRATE 2 PUFF: 100; 5 AEROSOL RESPIRATORY (INHALATION) at 20:39

## 2024-03-24 RX ADMIN — DEXAMETHASONE 6 MG: 4 TABLET ORAL at 21:28

## 2024-03-24 RX ADMIN — GABAPENTIN 300 MG: 300 CAPSULE ORAL at 21:28

## 2024-03-24 RX ADMIN — VALACYCLOVIR 500 MG: 500 TABLET, FILM COATED ORAL at 08:41

## 2024-03-24 RX ADMIN — BUPRENORPHINE HYDROCHLORIDE AND NALOXONE HYDROCHLORIDE DIHYDRATE 1 TABLET: 2; .5 TABLET SUBLINGUAL at 08:42

## 2024-03-24 RX ADMIN — DEXAMETHASONE 6 MG: 6 TABLET ORAL at 08:42

## 2024-03-24 RX ADMIN — MORPHINE SULFATE 2 MG: 2 INJECTION, SOLUTION INTRAMUSCULAR; INTRAVENOUS at 08:37

## 2024-03-24 RX ADMIN — ASPIRIN 81 MG: 81 TABLET, CHEWABLE ORAL at 08:41

## 2024-03-24 RX ADMIN — AMIODARONE HYDROCHLORIDE 200 MG: 200 TABLET ORAL at 08:41

## 2024-03-24 ASSESSMENT — PAIN - FUNCTIONAL ASSESSMENT: PAIN_FUNCTIONAL_ASSESSMENT: PREVENTS OR INTERFERES SOME ACTIVE ACTIVITIES AND ADLS

## 2024-03-24 ASSESSMENT — PAIN SCALES - GENERAL
PAINLEVEL_OUTOF10: 10
PAINLEVEL_OUTOF10: 0
PAINLEVEL_OUTOF10: 3
PAINLEVEL_OUTOF10: 2

## 2024-03-24 ASSESSMENT — PAIN DESCRIPTION - LOCATION
LOCATION: BACK
LOCATION: FACE;MOUTH

## 2024-03-24 ASSESSMENT — PAIN DESCRIPTION - ORIENTATION
ORIENTATION: RIGHT
ORIENTATION: UPPER

## 2024-03-24 ASSESSMENT — PAIN DESCRIPTION - DESCRIPTORS
DESCRIPTORS: ACHING
DESCRIPTORS: ACHING

## 2024-03-25 LAB
ANION GAP SERPL CALCULATED.3IONS-SCNC: 5 MMOL/L (ref 3–16)
ANISOCYTOSIS BLD QL SMEAR: ABNORMAL
BASOPHILS # BLD: 0 K/UL (ref 0–0.2)
BASOPHILS NFR BLD: 0.1 %
BUN SERPL-MCNC: 14 MG/DL (ref 7–20)
CALCIUM SERPL-MCNC: 8.5 MG/DL (ref 8.3–10.6)
CHLORIDE SERPL-SCNC: 103 MMOL/L (ref 99–110)
CO2 SERPL-SCNC: 35 MMOL/L (ref 21–32)
CREAT SERPL-MCNC: 0.6 MG/DL (ref 0.6–1.1)
DEPRECATED RDW RBC AUTO: 24 % (ref 12.4–15.4)
EOSINOPHIL # BLD: 0 K/UL (ref 0–0.6)
EOSINOPHIL NFR BLD: 0 %
GFR SERPLBLD CREATININE-BSD FMLA CKD-EPI: >90 ML/MIN/{1.73_M2}
GLUCOSE SERPL-MCNC: 134 MG/DL (ref 70–99)
HCT VFR BLD AUTO: 29.1 % (ref 36–48)
HGB BLD-MCNC: 9.6 G/DL (ref 12–16)
LYMPHOCYTES # BLD: 0.5 K/UL (ref 1–5.1)
LYMPHOCYTES NFR BLD: 4.9 %
MACROCYTES BLD QL SMEAR: ABNORMAL
MAGNESIUM SERPL-MCNC: 1.7 MG/DL (ref 1.8–2.4)
MCH RBC QN AUTO: 28.2 PG (ref 26–34)
MCHC RBC AUTO-ENTMCNC: 32.8 G/DL (ref 31–36)
MCV RBC AUTO: 86.2 FL (ref 80–100)
MICROCYTES BLD QL SMEAR: ABNORMAL
MONOCYTES # BLD: 0.3 K/UL (ref 0–1.3)
MONOCYTES NFR BLD: 2.8 %
NEUTROPHILS # BLD: 9.7 K/UL (ref 1.7–7.7)
NEUTROPHILS NFR BLD: 92.2 %
OVALOCYTES BLD QL SMEAR: ABNORMAL
PHOSPHATE SERPL-MCNC: 3.3 MG/DL (ref 2.5–4.9)
PLATELET # BLD AUTO: 75 K/UL (ref 135–450)
PLATELET BLD QL SMEAR: ABNORMAL
PMV BLD AUTO: 7.7 FL (ref 5–10.5)
POIKILOCYTOSIS BLD QL SMEAR: ABNORMAL
POLYCHROMASIA BLD QL SMEAR: ABNORMAL
POTASSIUM SERPL-SCNC: 3.9 MMOL/L (ref 3.5–5.1)
RBC # BLD AUTO: 3.38 M/UL (ref 4–5.2)
SLIDE REVIEW: ABNORMAL
SODIUM SERPL-SCNC: 143 MMOL/L (ref 136–145)
WBC # BLD AUTO: 10.5 K/UL (ref 4–11)

## 2024-03-25 PROCEDURE — 51798 US URINE CAPACITY MEASURE: CPT

## 2024-03-25 PROCEDURE — 85025 COMPLETE CBC W/AUTO DIFF WBC: CPT

## 2024-03-25 PROCEDURE — 2060000000 HC ICU INTERMEDIATE R&B

## 2024-03-25 PROCEDURE — 80048 BASIC METABOLIC PNL TOTAL CA: CPT

## 2024-03-25 PROCEDURE — 97166 OT EVAL MOD COMPLEX 45 MIN: CPT

## 2024-03-25 PROCEDURE — 6360000002 HC RX W HCPCS: Performed by: INTERNAL MEDICINE

## 2024-03-25 PROCEDURE — 6370000000 HC RX 637 (ALT 250 FOR IP): Performed by: INTERNAL MEDICINE

## 2024-03-25 PROCEDURE — 84100 ASSAY OF PHOSPHORUS: CPT

## 2024-03-25 PROCEDURE — 97116 GAIT TRAINING THERAPY: CPT | Performed by: PHYSICAL THERAPIST

## 2024-03-25 PROCEDURE — 2700000000 HC OXYGEN THERAPY PER DAY

## 2024-03-25 PROCEDURE — 97535 SELF CARE MNGMENT TRAINING: CPT

## 2024-03-25 PROCEDURE — 2580000003 HC RX 258: Performed by: STUDENT IN AN ORGANIZED HEALTH CARE EDUCATION/TRAINING PROGRAM

## 2024-03-25 PROCEDURE — 6370000000 HC RX 637 (ALT 250 FOR IP): Performed by: STUDENT IN AN ORGANIZED HEALTH CARE EDUCATION/TRAINING PROGRAM

## 2024-03-25 PROCEDURE — 83735 ASSAY OF MAGNESIUM: CPT

## 2024-03-25 PROCEDURE — 94761 N-INVAS EAR/PLS OXIMETRY MLT: CPT

## 2024-03-25 PROCEDURE — 99232 SBSQ HOSP IP/OBS MODERATE 35: CPT | Performed by: INTERNAL MEDICINE

## 2024-03-25 PROCEDURE — 94640 AIRWAY INHALATION TREATMENT: CPT

## 2024-03-25 PROCEDURE — 97162 PT EVAL MOD COMPLEX 30 MIN: CPT | Performed by: PHYSICAL THERAPIST

## 2024-03-25 PROCEDURE — 94669 MECHANICAL CHEST WALL OSCILL: CPT

## 2024-03-25 RX ADMIN — GABAPENTIN 300 MG: 300 CAPSULE ORAL at 21:20

## 2024-03-25 RX ADMIN — MOMETASONE FUROATE AND FORMOTEROL FUMARATE DIHYDRATE 2 PUFF: 100; 5 AEROSOL RESPIRATORY (INHALATION) at 21:12

## 2024-03-25 RX ADMIN — PROCHLORPERAZINE EDISYLATE 10 MG: 5 INJECTION INTRAMUSCULAR; INTRAVENOUS at 14:56

## 2024-03-25 RX ADMIN — AMIODARONE HYDROCHLORIDE 200 MG: 200 TABLET ORAL at 13:13

## 2024-03-25 RX ADMIN — MOMETASONE FUROATE AND FORMOTEROL FUMARATE DIHYDRATE 2 PUFF: 100; 5 AEROSOL RESPIRATORY (INHALATION) at 08:38

## 2024-03-25 RX ADMIN — Medication 1 PUFF: at 21:11

## 2024-03-25 RX ADMIN — DIPHENHYDRAMINE HCL 10 ML: 12.5 SOLUTION ORAL at 21:26

## 2024-03-25 RX ADMIN — ASPIRIN 81 MG: 81 TABLET, CHEWABLE ORAL at 13:14

## 2024-03-25 RX ADMIN — ESCITALOPRAM OXALATE 20 MG: 10 TABLET ORAL at 13:14

## 2024-03-25 RX ADMIN — DEXAMETHASONE 6 MG: 4 TABLET ORAL at 13:13

## 2024-03-25 RX ADMIN — Medication 10 ML: at 21:25

## 2024-03-25 RX ADMIN — PANTOPRAZOLE SODIUM 40 MG: 40 TABLET, DELAYED RELEASE ORAL at 13:14

## 2024-03-25 RX ADMIN — Medication 1 PUFF: at 12:03

## 2024-03-25 RX ADMIN — Medication 1 PUFF: at 08:38

## 2024-03-25 RX ADMIN — GABAPENTIN 300 MG: 300 CAPSULE ORAL at 13:14

## 2024-03-25 RX ADMIN — Medication 100 MG: at 13:14

## 2024-03-25 RX ADMIN — VALACYCLOVIR 500 MG: 500 TABLET, FILM COATED ORAL at 13:14

## 2024-03-25 RX ADMIN — BUPRENORPHINE HYDROCHLORIDE AND NALOXONE HYDROCHLORIDE DIHYDRATE 1 TABLET: 2; .5 TABLET SUBLINGUAL at 13:13

## 2024-03-25 RX ADMIN — DEXAMETHASONE 6 MG: 4 TABLET ORAL at 21:20

## 2024-03-25 RX ADMIN — GABAPENTIN 300 MG: 300 CAPSULE ORAL at 14:56

## 2024-03-25 RX ADMIN — LEVOTHYROXINE SODIUM 50 MCG: 0.05 TABLET ORAL at 13:14

## 2024-03-25 RX ADMIN — Medication 1 PUFF: at 04:12

## 2024-03-25 NOTE — ACP (ADVANCE CARE PLANNING)
Advance Care Planning     Advance Care Planning Activator (Inpatient)  Conversation Note      Date of ACP Conversation: 3/25/2024     Conversation Conducted with: Patient with Decision Making Capacity    ACP Activator: Li Lambert [facility] [place]    Health Care Decision Maker:     Current Designated Health Care Decision Maker:     Primary Decision Maker: Mahi Burch - Parent - [phone]    Care Preferences    Ventilation:  \"If you were in your present state of health and suddenly became very ill and were unable to breathe on your own, what would your preference be about the use of a ventilator (breathing machine) if it were available to you?\"      Would the patient desire the use of ventilator (breathing machine)?: yes    \"If your health worsens and it becomes clear that your chance of recovery is unlikely, what would your preference be about the use of a ventilator (breathing machine) if it were available to you?\"     Would the patient desire the use of ventilator (breathing machine)?: unsure      Resuscitation  \"CPR works best to restart the heart when there is a sudden event, like a heart attack, in someone who is otherwise healthy. Unfortunately, CPR does not typically restart the heart for people who have serious health conditions or who are very sick.\"    \"In the event your heart stopped as a result of an underlying serious health condition, would you want attempts to be made to restart your heart (answer \"yes\" for attempt to resuscitate) or would you prefer a natural death (answer \"no\" for do not attempt to resuscitate)?\" yes       [] Yes   [x] No   Educated Patient / Decision Maker regarding differences between Advance Directives and portable DNR orders.    Length of ACP Conversation in minutes:  2    Conversation Outcomes:  ACP discussion completed    Follow-up plan:    [] Schedule follow-up conversation to continue planning  [] Referred individual to Provider for additional questions/concerns   [] Advised

## 2024-03-26 ENCOUNTER — APPOINTMENT (OUTPATIENT)
Dept: GENERAL RADIOLOGY | Age: 49
DRG: 816 | End: 2024-03-26
Payer: COMMERCIAL

## 2024-03-26 LAB
BASOPHILS # BLD: 0 K/UL (ref 0–0.2)
BASOPHILS NFR BLD: 0.1 %
DEPRECATED RDW RBC AUTO: 23.7 % (ref 12.4–15.4)
EOSINOPHIL # BLD: 0 K/UL (ref 0–0.6)
EOSINOPHIL NFR BLD: 0 %
HCT VFR BLD AUTO: 27.9 % (ref 36–48)
HGB BLD-MCNC: 9 G/DL (ref 12–16)
LYMPHOCYTES # BLD: 0.4 K/UL (ref 1–5.1)
LYMPHOCYTES NFR BLD: 6.6 %
MCH RBC QN AUTO: 28.1 PG (ref 26–34)
MCHC RBC AUTO-ENTMCNC: 32.4 G/DL (ref 31–36)
MCV RBC AUTO: 86.7 FL (ref 80–100)
MONOCYTES # BLD: 0.2 K/UL (ref 0–1.3)
MONOCYTES NFR BLD: 3 %
NEUTROPHILS # BLD: 5 K/UL (ref 1.7–7.7)
NEUTROPHILS NFR BLD: 90.3 %
PLATELET # BLD AUTO: 56 K/UL (ref 135–450)
PMV BLD AUTO: 7.5 FL (ref 5–10.5)
RBC # BLD AUTO: 3.21 M/UL (ref 4–5.2)
WBC # BLD AUTO: 5.5 K/UL (ref 4–11)

## 2024-03-26 PROCEDURE — 94761 N-INVAS EAR/PLS OXIMETRY MLT: CPT

## 2024-03-26 PROCEDURE — 85025 COMPLETE CBC W/AUTO DIFF WBC: CPT

## 2024-03-26 PROCEDURE — 6360000002 HC RX W HCPCS: Performed by: STUDENT IN AN ORGANIZED HEALTH CARE EDUCATION/TRAINING PROGRAM

## 2024-03-26 PROCEDURE — 97530 THERAPEUTIC ACTIVITIES: CPT

## 2024-03-26 PROCEDURE — 94669 MECHANICAL CHEST WALL OSCILL: CPT

## 2024-03-26 PROCEDURE — 71045 X-RAY EXAM CHEST 1 VIEW: CPT

## 2024-03-26 PROCEDURE — 6360000002 HC RX W HCPCS: Performed by: INTERNAL MEDICINE

## 2024-03-26 PROCEDURE — 6370000000 HC RX 637 (ALT 250 FOR IP): Performed by: INTERNAL MEDICINE

## 2024-03-26 PROCEDURE — 2580000003 HC RX 258: Performed by: STUDENT IN AN ORGANIZED HEALTH CARE EDUCATION/TRAINING PROGRAM

## 2024-03-26 PROCEDURE — 2700000000 HC OXYGEN THERAPY PER DAY

## 2024-03-26 PROCEDURE — 6370000000 HC RX 637 (ALT 250 FOR IP): Performed by: STUDENT IN AN ORGANIZED HEALTH CARE EDUCATION/TRAINING PROGRAM

## 2024-03-26 PROCEDURE — 99232 SBSQ HOSP IP/OBS MODERATE 35: CPT | Performed by: INTERNAL MEDICINE

## 2024-03-26 PROCEDURE — 2060000000 HC ICU INTERMEDIATE R&B

## 2024-03-26 PROCEDURE — 94640 AIRWAY INHALATION TREATMENT: CPT

## 2024-03-26 PROCEDURE — 99223 1ST HOSP IP/OBS HIGH 75: CPT | Performed by: INTERNAL MEDICINE

## 2024-03-26 RX ORDER — MAGNESIUM SULFATE IN WATER 40 MG/ML
2000 INJECTION, SOLUTION INTRAVENOUS ONCE
Status: COMPLETED | OUTPATIENT
Start: 2024-03-26 | End: 2024-03-26

## 2024-03-26 RX ADMIN — IPRATROPIUM BROMIDE AND ALBUTEROL 1 PUFF: 20; 100 SPRAY, METERED RESPIRATORY (INHALATION) at 16:31

## 2024-03-26 RX ADMIN — PANTOPRAZOLE SODIUM 40 MG: 40 TABLET, DELAYED RELEASE ORAL at 05:47

## 2024-03-26 RX ADMIN — IPRATROPIUM BROMIDE AND ALBUTEROL 1 PUFF: 20; 100 SPRAY, METERED RESPIRATORY (INHALATION) at 08:20

## 2024-03-26 RX ADMIN — ESCITALOPRAM OXALATE 20 MG: 10 TABLET ORAL at 08:33

## 2024-03-26 RX ADMIN — GABAPENTIN 300 MG: 300 CAPSULE ORAL at 08:33

## 2024-03-26 RX ADMIN — Medication 100 MG: at 08:33

## 2024-03-26 RX ADMIN — DEXAMETHASONE 6 MG: 4 TABLET ORAL at 08:33

## 2024-03-26 RX ADMIN — DEXAMETHASONE 6 MG: 4 TABLET ORAL at 21:22

## 2024-03-26 RX ADMIN — LORAZEPAM 3 MG: 2 INJECTION INTRAMUSCULAR; INTRAVENOUS at 04:47

## 2024-03-26 RX ADMIN — IPRATROPIUM BROMIDE AND ALBUTEROL 1 PUFF: 20; 100 SPRAY, METERED RESPIRATORY (INHALATION) at 21:00

## 2024-03-26 RX ADMIN — MOMETASONE FUROATE AND FORMOTEROL FUMARATE DIHYDRATE 2 PUFF: 100; 5 AEROSOL RESPIRATORY (INHALATION) at 21:00

## 2024-03-26 RX ADMIN — Medication 10 ML: at 08:34

## 2024-03-26 RX ADMIN — BUPRENORPHINE HYDROCHLORIDE AND NALOXONE HYDROCHLORIDE DIHYDRATE 1 TABLET: 2; .5 TABLET SUBLINGUAL at 08:33

## 2024-03-26 RX ADMIN — LEVOTHYROXINE SODIUM 50 MCG: 0.05 TABLET ORAL at 05:47

## 2024-03-26 RX ADMIN — GABAPENTIN 300 MG: 300 CAPSULE ORAL at 21:22

## 2024-03-26 RX ADMIN — ENOXAPARIN SODIUM 30 MG: 100 INJECTION SUBCUTANEOUS at 08:33

## 2024-03-26 RX ADMIN — ASPIRIN 81 MG: 81 TABLET, CHEWABLE ORAL at 08:33

## 2024-03-26 RX ADMIN — VALACYCLOVIR 500 MG: 500 TABLET, FILM COATED ORAL at 08:33

## 2024-03-26 RX ADMIN — MOMETASONE FUROATE AND FORMOTEROL FUMARATE DIHYDRATE 2 PUFF: 100; 5 AEROSOL RESPIRATORY (INHALATION) at 08:22

## 2024-03-26 RX ADMIN — GABAPENTIN 300 MG: 300 CAPSULE ORAL at 14:28

## 2024-03-26 RX ADMIN — MAGNESIUM SULFATE HEPTAHYDRATE 2000 MG: 40 INJECTION, SOLUTION INTRAVENOUS at 10:25

## 2024-03-26 RX ADMIN — LORAZEPAM 2 MG: 1 TABLET ORAL at 15:07

## 2024-03-26 RX ADMIN — AMIODARONE HYDROCHLORIDE 200 MG: 200 TABLET ORAL at 08:33

## 2024-03-26 RX ADMIN — LORAZEPAM 3 MG: 1 TABLET ORAL at 22:59

## 2024-03-26 RX ADMIN — IPRATROPIUM BROMIDE AND ALBUTEROL 1 PUFF: 20; 100 SPRAY, METERED RESPIRATORY (INHALATION) at 12:26

## 2024-03-26 NOTE — CONSULTS
ROS: negative  Musculoskeletal ROS: negative  Neurological ROS: negative  Dermatological ROS: negative        Physical Exam:    Vitals: /78   Pulse 61   Temp 98.4 °F (36.9 °C) (Oral)   Resp 20   Ht 1.702 m (5' 7\")   Wt 63.2 kg (139 lb 5.3 oz)   SpO2 93%   BMI 21.82 kg/m²     Last Body weight:   Wt Readings from Last 3 Encounters:   03/23/24 63.2 kg (139 lb 5.3 oz)   02/26/24 60 kg (132 lb 4.4 oz)   02/15/24 60 kg (132 lb 4.4 oz)       Body Mass Index : Body mass index is 21.82 kg/m².      Intake and Output summary:   Intake/Output Summary (Last 24 hours) at 3/23/2024 1155  Last data filed at 3/23/2024 0512  Gross per 24 hour   Intake 347.95 ml   Output --   Net 347.95 ml       Physical Examination:     PHYSICAL EXAM:    Gen: Moderate acute distress  Eyes: PERRL. Anicteric sclera. No conjunctival injection.   ENT: No discharge. Posterior oropharynx clear. External appearance of ears and nose normal.  Neck: Trachea midline. No mass, no lymphadenopathy    Resp: Diminished breath sounds bilaterally, increased work of breathing  CV: Regular rate. Regular rhythm. No murmur or rub. No edema.   GI: Soft, Non-tender. Non-distended. +BS  Skin: Warm, dry, w/o erythema.   Lymph: No cervical or supraclavicular LAD.   M/S: No cyanosis. No clubbing.    Neuro:  CN 2-12 tested, no focal neurologic deficit.  Moves all extremities  Psych: Awake and alert, Oriented x 3.  Judgement and insight appropriate.  Mood stable.        Laboratory findings:-    CBC:   Recent Labs     03/23/24  0423   WBC 8.3   HGB 10.5*   PLT 52*     BMP:    Recent Labs     03/22/24  2235 03/23/24  0423   * 136   K 2.1* 3.4*   CL 90* 94*   CO2 34* 31   BUN 4* 4*   CREATININE <0.5* <0.5*   GLUCOSE 138* 149*     S. Calcium:  Recent Labs     03/23/24  0423   CALCIUM 7.6*     S. Magnesium:  Recent Labs     03/23/24  0423   MG 1.60*     S. Phosphorus:  Recent Labs     03/23/24  0423   PHOS 3.1       INR:   Recent Labs     03/22/24  2235   INR 1.34* 
further episodes of PVC or NSVT.   With polysubstance use and alcohol causing electrolytes derangement, her Qtc prolonged to 650 which is at a very high risk of polymorphic VT (torsades). I discussed with the patient briefly about the implication of abnormal electrolytes but she lacks insight in her medical condition. She would clearly not be a candidate for ICD given that the VT would be reversible if her electrolytes were corrected.  Continue repletion of K>4 and Mg >2.   Agree with resumption of amiodarone 200 mg daily. But to prevent risk of VT/VF arrest, absolute cessation of alcohol is important.     EP/cardiology will sign off.    Thank you for allowing me to participate in the care of Lali Cazares . If you have any questions/comments, please do not hesitate to contact us.      Electronically signed by Amrik Hardy MD on 3/26/2024 at 3:32 PM  Cardiac Electrophysiology  [facility]

## 2024-03-26 NOTE — RT PROTOCOL NOTE
Protocol order mode.        4-6 - enter or revise RT Bronchodilator order(s) to two equivalent RT bronchodilator orders with one order with BID Frequency and one order with Frequency of every 4 hours PRN wheezing or increased work of breathing using Per Protocol order mode.        7-10 - enter or revise RT Bronchodilator order(s) to two equivalent RT bronchodilator orders with one order with TID Frequency and one order with Frequency of every 4 hours PRN wheezing or increased work of breathing using Per Protocol order mode.       11-13 - enter or revise RT Bronchodilator order(s) to one equivalent RT bronchodilator order with QID Frequency and an Albuterol order with Frequency of every 4 hours PRN wheezing or increased work of breathing using Per Protocol order mode.      Greater than 13 - enter or revise RT Bronchodilator order(s) to one equivalent RT bronchodilator order with every 4 hours Frequency and an Albuterol order with Frequency of every 2 hours PRN wheezing or increased work of breathing using Per Protocol order mode.     RT to enter RT Home Evaluation for COPD & MDI Assessment order using Per Protocol order mode.    Electronically signed by Patty Rai RCP on 3/26/2024 at 3:00 AM

## 2024-03-27 LAB
ALBUMIN SERPL-MCNC: 2.9 G/DL (ref 3.4–5)
ALBUMIN/GLOB SERPL: 1.2 {RATIO} (ref 1.1–2.2)
ALP SERPL-CCNC: 130 U/L (ref 40–129)
ALT SERPL-CCNC: 43 U/L (ref 10–40)
ANION GAP SERPL CALCULATED.3IONS-SCNC: 7 MMOL/L (ref 3–16)
AST SERPL-CCNC: 54 U/L (ref 15–37)
BASOPHILS # BLD: 0 K/UL (ref 0–0.2)
BASOPHILS NFR BLD: 0.2 %
BILIRUB SERPL-MCNC: 0.8 MG/DL (ref 0–1)
BUN SERPL-MCNC: 13 MG/DL (ref 7–20)
CALCIUM SERPL-MCNC: 8.3 MG/DL (ref 8.3–10.6)
CHLORIDE SERPL-SCNC: 104 MMOL/L (ref 99–110)
CO2 SERPL-SCNC: 32 MMOL/L (ref 21–32)
CREAT SERPL-MCNC: 0.5 MG/DL (ref 0.6–1.1)
DEPRECATED RDW RBC AUTO: 23 % (ref 12.4–15.4)
EOSINOPHIL # BLD: 0 K/UL (ref 0–0.6)
EOSINOPHIL NFR BLD: 0 %
GFR SERPLBLD CREATININE-BSD FMLA CKD-EPI: >90 ML/MIN/{1.73_M2}
GLUCOSE SERPL-MCNC: 161 MG/DL (ref 70–99)
HCT VFR BLD AUTO: 27.4 % (ref 36–48)
HGB BLD-MCNC: 8.8 G/DL (ref 12–16)
LYMPHOCYTES # BLD: 0.4 K/UL (ref 1–5.1)
LYMPHOCYTES NFR BLD: 6.2 %
MAGNESIUM SERPL-MCNC: 1.7 MG/DL (ref 1.8–2.4)
MCH RBC QN AUTO: 28 PG (ref 26–34)
MCHC RBC AUTO-ENTMCNC: 32.2 G/DL (ref 31–36)
MCV RBC AUTO: 86.8 FL (ref 80–100)
MONOCYTES # BLD: 0.3 K/UL (ref 0–1.3)
MONOCYTES NFR BLD: 4.4 %
NEUTROPHILS # BLD: 5.6 K/UL (ref 1.7–7.7)
NEUTROPHILS NFR BLD: 89.2 %
PHOSPHATE SERPL-MCNC: 2.8 MG/DL (ref 2.5–4.9)
PLATELET # BLD AUTO: 50 K/UL (ref 135–450)
PMV BLD AUTO: 8.2 FL (ref 5–10.5)
POTASSIUM SERPL-SCNC: 4.3 MMOL/L (ref 3.5–5.1)
PROT SERPL-MCNC: 5.3 G/DL (ref 6.4–8.2)
RBC # BLD AUTO: 3.16 M/UL (ref 4–5.2)
SODIUM SERPL-SCNC: 143 MMOL/L (ref 136–145)
WBC # BLD AUTO: 6.3 K/UL (ref 4–11)

## 2024-03-27 PROCEDURE — 6370000000 HC RX 637 (ALT 250 FOR IP): Performed by: STUDENT IN AN ORGANIZED HEALTH CARE EDUCATION/TRAINING PROGRAM

## 2024-03-27 PROCEDURE — 2580000003 HC RX 258: Performed by: STUDENT IN AN ORGANIZED HEALTH CARE EDUCATION/TRAINING PROGRAM

## 2024-03-27 PROCEDURE — 94640 AIRWAY INHALATION TREATMENT: CPT

## 2024-03-27 PROCEDURE — 99232 SBSQ HOSP IP/OBS MODERATE 35: CPT | Performed by: INTERNAL MEDICINE

## 2024-03-27 PROCEDURE — 84100 ASSAY OF PHOSPHORUS: CPT

## 2024-03-27 PROCEDURE — 9990000010 HC NO CHARGE VISIT: Performed by: PHYSICAL THERAPIST

## 2024-03-27 PROCEDURE — 6370000000 HC RX 637 (ALT 250 FOR IP): Performed by: INTERNAL MEDICINE

## 2024-03-27 PROCEDURE — 94669 MECHANICAL CHEST WALL OSCILL: CPT

## 2024-03-27 PROCEDURE — 6360000002 HC RX W HCPCS: Performed by: INTERNAL MEDICINE

## 2024-03-27 PROCEDURE — 85025 COMPLETE CBC W/AUTO DIFF WBC: CPT

## 2024-03-27 PROCEDURE — 80053 COMPREHEN METABOLIC PANEL: CPT

## 2024-03-27 PROCEDURE — 94761 N-INVAS EAR/PLS OXIMETRY MLT: CPT

## 2024-03-27 PROCEDURE — 97535 SELF CARE MNGMENT TRAINING: CPT

## 2024-03-27 PROCEDURE — 97530 THERAPEUTIC ACTIVITIES: CPT

## 2024-03-27 PROCEDURE — 6360000002 HC RX W HCPCS: Performed by: STUDENT IN AN ORGANIZED HEALTH CARE EDUCATION/TRAINING PROGRAM

## 2024-03-27 PROCEDURE — 2700000000 HC OXYGEN THERAPY PER DAY

## 2024-03-27 PROCEDURE — 2060000000 HC ICU INTERMEDIATE R&B

## 2024-03-27 PROCEDURE — 83735 ASSAY OF MAGNESIUM: CPT

## 2024-03-27 RX ORDER — DEXAMETHASONE 4 MG/1
6 TABLET ORAL DAILY
Status: DISCONTINUED | OUTPATIENT
Start: 2024-03-28 | End: 2024-04-01

## 2024-03-27 RX ADMIN — BUPRENORPHINE HYDROCHLORIDE AND NALOXONE HYDROCHLORIDE DIHYDRATE 1 TABLET: 2; .5 TABLET SUBLINGUAL at 08:21

## 2024-03-27 RX ADMIN — LORAZEPAM 2 MG: 1 TABLET ORAL at 18:02

## 2024-03-27 RX ADMIN — ESCITALOPRAM OXALATE 20 MG: 10 TABLET ORAL at 08:21

## 2024-03-27 RX ADMIN — IPRATROPIUM BROMIDE AND ALBUTEROL 1 PUFF: 20; 100 SPRAY, METERED RESPIRATORY (INHALATION) at 21:07

## 2024-03-27 RX ADMIN — IPRATROPIUM BROMIDE AND ALBUTEROL 1 PUFF: 20; 100 SPRAY, METERED RESPIRATORY (INHALATION) at 16:43

## 2024-03-27 RX ADMIN — PANTOPRAZOLE SODIUM 40 MG: 40 TABLET, DELAYED RELEASE ORAL at 05:33

## 2024-03-27 RX ADMIN — IPRATROPIUM BROMIDE AND ALBUTEROL 1 PUFF: 20; 100 SPRAY, METERED RESPIRATORY (INHALATION) at 08:57

## 2024-03-27 RX ADMIN — ASPIRIN 81 MG: 81 TABLET, CHEWABLE ORAL at 08:21

## 2024-03-27 RX ADMIN — ACETAMINOPHEN 325MG 650 MG: 325 TABLET ORAL at 18:02

## 2024-03-27 RX ADMIN — LORAZEPAM 1 MG: 1 TABLET ORAL at 13:20

## 2024-03-27 RX ADMIN — IPRATROPIUM BROMIDE AND ALBUTEROL 1 PUFF: 20; 100 SPRAY, METERED RESPIRATORY (INHALATION) at 11:52

## 2024-03-27 RX ADMIN — MOMETASONE FUROATE AND FORMOTEROL FUMARATE DIHYDRATE 2 PUFF: 100; 5 AEROSOL RESPIRATORY (INHALATION) at 21:08

## 2024-03-27 RX ADMIN — VALACYCLOVIR 500 MG: 500 TABLET, FILM COATED ORAL at 08:21

## 2024-03-27 RX ADMIN — GABAPENTIN 300 MG: 300 CAPSULE ORAL at 14:36

## 2024-03-27 RX ADMIN — MOMETASONE FUROATE AND FORMOTEROL FUMARATE DIHYDRATE 2 PUFF: 100; 5 AEROSOL RESPIRATORY (INHALATION) at 08:59

## 2024-03-27 RX ADMIN — GABAPENTIN 300 MG: 300 CAPSULE ORAL at 08:21

## 2024-03-27 RX ADMIN — Medication 10 ML: at 08:21

## 2024-03-27 RX ADMIN — LORAZEPAM 2 MG: 2 INJECTION INTRAMUSCULAR; INTRAVENOUS at 21:55

## 2024-03-27 RX ADMIN — MORPHINE SULFATE 2 MG: 2 INJECTION, SOLUTION INTRAMUSCULAR; INTRAVENOUS at 21:55

## 2024-03-27 RX ADMIN — DEXAMETHASONE 6 MG: 4 TABLET ORAL at 08:20

## 2024-03-27 RX ADMIN — Medication 10 ML: at 21:02

## 2024-03-27 RX ADMIN — AMIODARONE HYDROCHLORIDE 200 MG: 200 TABLET ORAL at 08:21

## 2024-03-27 RX ADMIN — GABAPENTIN 300 MG: 300 CAPSULE ORAL at 21:02

## 2024-03-27 RX ADMIN — Medication 100 MG: at 08:21

## 2024-03-27 RX ADMIN — LEVOTHYROXINE SODIUM 50 MCG: 0.05 TABLET ORAL at 05:33

## 2024-03-27 ASSESSMENT — PAIN DESCRIPTION - DESCRIPTORS: DESCRIPTORS: ACHING;DISCOMFORT

## 2024-03-27 ASSESSMENT — PAIN DESCRIPTION - LOCATION: LOCATION: HEAD;LEG

## 2024-03-27 ASSESSMENT — PAIN SCALES - GENERAL
PAINLEVEL_OUTOF10: 3
PAINLEVEL_OUTOF10: 0

## 2024-03-27 ASSESSMENT — PAIN DESCRIPTION - ORIENTATION: ORIENTATION: MID

## 2024-03-27 ASSESSMENT — PAIN - FUNCTIONAL ASSESSMENT: PAIN_FUNCTIONAL_ASSESSMENT: ACTIVITIES ARE NOT PREVENTED

## 2024-03-28 LAB
ALBUMIN SERPL-MCNC: 2.9 G/DL (ref 3.4–5)
ALBUMIN/GLOB SERPL: 1.5 {RATIO} (ref 1.1–2.2)
ALP SERPL-CCNC: 118 U/L (ref 40–129)
ALT SERPL-CCNC: 46 U/L (ref 10–40)
ANION GAP SERPL CALCULATED.3IONS-SCNC: 4 MMOL/L (ref 3–16)
AST SERPL-CCNC: 45 U/L (ref 15–37)
BASOPHILS # BLD: 0 K/UL (ref 0–0.2)
BASOPHILS NFR BLD: 0.1 %
BILIRUB SERPL-MCNC: 0.7 MG/DL (ref 0–1)
BUN SERPL-MCNC: 13 MG/DL (ref 7–20)
CALCIUM SERPL-MCNC: 8.3 MG/DL (ref 8.3–10.6)
CHLORIDE SERPL-SCNC: 107 MMOL/L (ref 99–110)
CO2 SERPL-SCNC: 31 MMOL/L (ref 21–32)
CREAT SERPL-MCNC: 0.5 MG/DL (ref 0.6–1.1)
DEPRECATED RDW RBC AUTO: 23.4 % (ref 12.4–15.4)
EOSINOPHIL # BLD: 0 K/UL (ref 0–0.6)
EOSINOPHIL NFR BLD: 0 %
GFR SERPLBLD CREATININE-BSD FMLA CKD-EPI: >90 ML/MIN/{1.73_M2}
GLUCOSE SERPL-MCNC: 104 MG/DL (ref 70–99)
HCT VFR BLD AUTO: 26.2 % (ref 36–48)
HGB BLD-MCNC: 8.5 G/DL (ref 12–16)
LYMPHOCYTES # BLD: 0.7 K/UL (ref 1–5.1)
LYMPHOCYTES NFR BLD: 13.1 %
MCH RBC QN AUTO: 28.1 PG (ref 26–34)
MCHC RBC AUTO-ENTMCNC: 32.4 G/DL (ref 31–36)
MCV RBC AUTO: 86.9 FL (ref 80–100)
MONOCYTES # BLD: 0.5 K/UL (ref 0–1.3)
MONOCYTES NFR BLD: 8.6 %
NEUTROPHILS # BLD: 4.2 K/UL (ref 1.7–7.7)
NEUTROPHILS NFR BLD: 78.2 %
PLATELET # BLD AUTO: 48 K/UL (ref 135–450)
PMV BLD AUTO: 8 FL (ref 5–10.5)
POTASSIUM SERPL-SCNC: 3.9 MMOL/L (ref 3.5–5.1)
PROT SERPL-MCNC: 4.9 G/DL (ref 6.4–8.2)
RBC # BLD AUTO: 3.02 M/UL (ref 4–5.2)
SODIUM SERPL-SCNC: 142 MMOL/L (ref 136–145)
WBC # BLD AUTO: 5.4 K/UL (ref 4–11)

## 2024-03-28 PROCEDURE — 94640 AIRWAY INHALATION TREATMENT: CPT

## 2024-03-28 PROCEDURE — 6370000000 HC RX 637 (ALT 250 FOR IP): Performed by: STUDENT IN AN ORGANIZED HEALTH CARE EDUCATION/TRAINING PROGRAM

## 2024-03-28 PROCEDURE — 6360000002 HC RX W HCPCS: Performed by: INTERNAL MEDICINE

## 2024-03-28 PROCEDURE — 6370000000 HC RX 637 (ALT 250 FOR IP): Performed by: INTERNAL MEDICINE

## 2024-03-28 PROCEDURE — 94761 N-INVAS EAR/PLS OXIMETRY MLT: CPT

## 2024-03-28 PROCEDURE — 6360000002 HC RX W HCPCS: Performed by: STUDENT IN AN ORGANIZED HEALTH CARE EDUCATION/TRAINING PROGRAM

## 2024-03-28 PROCEDURE — 80053 COMPREHEN METABOLIC PANEL: CPT

## 2024-03-28 PROCEDURE — 2060000000 HC ICU INTERMEDIATE R&B

## 2024-03-28 PROCEDURE — 2580000003 HC RX 258: Performed by: STUDENT IN AN ORGANIZED HEALTH CARE EDUCATION/TRAINING PROGRAM

## 2024-03-28 PROCEDURE — 85025 COMPLETE CBC W/AUTO DIFF WBC: CPT

## 2024-03-28 PROCEDURE — 99231 SBSQ HOSP IP/OBS SF/LOW 25: CPT | Performed by: INTERNAL MEDICINE

## 2024-03-28 RX ORDER — MAGNESIUM SULFATE 1 G/100ML
1000 INJECTION INTRAVENOUS ONCE
Status: COMPLETED | OUTPATIENT
Start: 2024-03-28 | End: 2024-03-28

## 2024-03-28 RX ADMIN — BUPRENORPHINE HYDROCHLORIDE AND NALOXONE HYDROCHLORIDE DIHYDRATE 1 TABLET: 2; .5 TABLET SUBLINGUAL at 08:23

## 2024-03-28 RX ADMIN — Medication 100 MG: at 08:23

## 2024-03-28 RX ADMIN — IPRATROPIUM BROMIDE AND ALBUTEROL 1 PUFF: 20; 100 SPRAY, METERED RESPIRATORY (INHALATION) at 21:04

## 2024-03-28 RX ADMIN — DEXAMETHASONE 6 MG: 4 TABLET ORAL at 08:23

## 2024-03-28 RX ADMIN — Medication 10 ML: at 21:25

## 2024-03-28 RX ADMIN — MOMETASONE FUROATE AND FORMOTEROL FUMARATE DIHYDRATE 2 PUFF: 100; 5 AEROSOL RESPIRATORY (INHALATION) at 21:04

## 2024-03-28 RX ADMIN — MAGNESIUM SULFATE HEPTAHYDRATE 1000 MG: 1 INJECTION, SOLUTION INTRAVENOUS at 08:38

## 2024-03-28 RX ADMIN — Medication 10 ML: at 08:23

## 2024-03-28 RX ADMIN — LORAZEPAM 3 MG: 2 INJECTION INTRAMUSCULAR; INTRAVENOUS at 17:36

## 2024-03-28 RX ADMIN — GABAPENTIN 300 MG: 300 CAPSULE ORAL at 08:23

## 2024-03-28 RX ADMIN — LORAZEPAM 2 MG: 1 TABLET ORAL at 05:59

## 2024-03-28 RX ADMIN — IPRATROPIUM BROMIDE AND ALBUTEROL 1 PUFF: 20; 100 SPRAY, METERED RESPIRATORY (INHALATION) at 16:28

## 2024-03-28 RX ADMIN — ENOXAPARIN SODIUM 30 MG: 100 INJECTION SUBCUTANEOUS at 08:21

## 2024-03-28 RX ADMIN — LORAZEPAM 2 MG: 2 INJECTION INTRAMUSCULAR; INTRAVENOUS at 03:03

## 2024-03-28 RX ADMIN — VALACYCLOVIR 500 MG: 500 TABLET, FILM COATED ORAL at 08:23

## 2024-03-28 RX ADMIN — PANTOPRAZOLE SODIUM 40 MG: 40 TABLET, DELAYED RELEASE ORAL at 05:59

## 2024-03-28 RX ADMIN — LORAZEPAM 2 MG: 1 TABLET ORAL at 23:18

## 2024-03-28 RX ADMIN — ENOXAPARIN SODIUM 30 MG: 100 INJECTION SUBCUTANEOUS at 21:25

## 2024-03-28 RX ADMIN — IPRATROPIUM BROMIDE AND ALBUTEROL 1 PUFF: 20; 100 SPRAY, METERED RESPIRATORY (INHALATION) at 11:22

## 2024-03-28 RX ADMIN — ESCITALOPRAM OXALATE 20 MG: 10 TABLET ORAL at 08:23

## 2024-03-28 RX ADMIN — MOMETASONE FUROATE AND FORMOTEROL FUMARATE DIHYDRATE 2 PUFF: 100; 5 AEROSOL RESPIRATORY (INHALATION) at 11:22

## 2024-03-28 RX ADMIN — ASPIRIN 81 MG: 81 TABLET, CHEWABLE ORAL at 08:23

## 2024-03-28 RX ADMIN — LORAZEPAM 2 MG: 1 TABLET ORAL at 08:26

## 2024-03-28 RX ADMIN — LEVOTHYROXINE SODIUM 50 MCG: 0.05 TABLET ORAL at 05:59

## 2024-03-28 RX ADMIN — GABAPENTIN 300 MG: 300 CAPSULE ORAL at 21:25

## 2024-03-28 RX ADMIN — AMIODARONE HYDROCHLORIDE 200 MG: 200 TABLET ORAL at 08:23

## 2024-03-28 ASSESSMENT — PAIN SCALES - GENERAL
PAINLEVEL_OUTOF10: 0

## 2024-03-29 LAB
ALBUMIN SERPL-MCNC: 2.9 G/DL (ref 3.4–5)
ALBUMIN SERPL-MCNC: 3.1 G/DL (ref 3.4–5)
ALBUMIN/GLOB SERPL: 1.3 {RATIO} (ref 1.1–2.2)
ALBUMIN/GLOB SERPL: 1.5 {RATIO} (ref 1.1–2.2)
ALP SERPL-CCNC: 107 U/L (ref 40–129)
ALP SERPL-CCNC: 114 U/L (ref 40–129)
ALT SERPL-CCNC: 51 U/L (ref 10–40)
ALT SERPL-CCNC: 52 U/L (ref 10–40)
ANION GAP SERPL CALCULATED.3IONS-SCNC: 8 MMOL/L (ref 3–16)
ANION GAP SERPL CALCULATED.3IONS-SCNC: 8 MMOL/L (ref 3–16)
AST SERPL-CCNC: 40 U/L (ref 15–37)
AST SERPL-CCNC: 48 U/L (ref 15–37)
BASOPHILS # BLD: 0 K/UL (ref 0–0.2)
BASOPHILS # BLD: 0 K/UL (ref 0–0.2)
BASOPHILS NFR BLD: 0.1 %
BASOPHILS NFR BLD: 0.1 %
BILIRUB SERPL-MCNC: 0.7 MG/DL (ref 0–1)
BILIRUB SERPL-MCNC: 0.7 MG/DL (ref 0–1)
BUN SERPL-MCNC: 13 MG/DL (ref 7–20)
BUN SERPL-MCNC: 13 MG/DL (ref 7–20)
CALCIUM SERPL-MCNC: 8.2 MG/DL (ref 8.3–10.6)
CALCIUM SERPL-MCNC: 8.3 MG/DL (ref 8.3–10.6)
CHLORIDE SERPL-SCNC: 104 MMOL/L (ref 99–110)
CHLORIDE SERPL-SCNC: 105 MMOL/L (ref 99–110)
CO2 SERPL-SCNC: 27 MMOL/L (ref 21–32)
CO2 SERPL-SCNC: 30 MMOL/L (ref 21–32)
CREAT SERPL-MCNC: 0.6 MG/DL (ref 0.6–1.1)
CREAT SERPL-MCNC: <0.5 MG/DL (ref 0.6–1.1)
DEPRECATED RDW RBC AUTO: 22.6 % (ref 12.4–15.4)
DEPRECATED RDW RBC AUTO: 22.8 % (ref 12.4–15.4)
EOSINOPHIL # BLD: 0 K/UL (ref 0–0.6)
EOSINOPHIL # BLD: 0 K/UL (ref 0–0.6)
EOSINOPHIL NFR BLD: 0.2 %
EOSINOPHIL NFR BLD: 0.2 %
GFR SERPLBLD CREATININE-BSD FMLA CKD-EPI: >90 ML/MIN/{1.73_M2}
GFR SERPLBLD CREATININE-BSD FMLA CKD-EPI: >90 ML/MIN/{1.73_M2}
GLUCOSE SERPL-MCNC: 87 MG/DL (ref 70–99)
GLUCOSE SERPL-MCNC: 97 MG/DL (ref 70–99)
HCT VFR BLD AUTO: 27.3 % (ref 36–48)
HCT VFR BLD AUTO: 27.7 % (ref 36–48)
HGB BLD-MCNC: 8.7 G/DL (ref 12–16)
HGB BLD-MCNC: 8.9 G/DL (ref 12–16)
LYMPHOCYTES # BLD: 0.8 K/UL (ref 1–5.1)
LYMPHOCYTES # BLD: 0.8 K/UL (ref 1–5.1)
LYMPHOCYTES NFR BLD: 14.5 %
LYMPHOCYTES NFR BLD: 15.3 %
MAGNESIUM SERPL-MCNC: 1.7 MG/DL (ref 1.8–2.4)
MAGNESIUM SERPL-MCNC: 1.7 MG/DL (ref 1.8–2.4)
MCH RBC QN AUTO: 28.3 PG (ref 26–34)
MCH RBC QN AUTO: 28.4 PG (ref 26–34)
MCHC RBC AUTO-ENTMCNC: 32 G/DL (ref 31–36)
MCHC RBC AUTO-ENTMCNC: 32.3 G/DL (ref 31–36)
MCV RBC AUTO: 88 FL (ref 80–100)
MCV RBC AUTO: 88.4 FL (ref 80–100)
MONOCYTES # BLD: 0.5 K/UL (ref 0–1.3)
MONOCYTES # BLD: 0.5 K/UL (ref 0–1.3)
MONOCYTES NFR BLD: 8.5 %
MONOCYTES NFR BLD: 9 %
NEUTROPHILS # BLD: 3.8 K/UL (ref 1.7–7.7)
NEUTROPHILS # BLD: 4.1 K/UL (ref 1.7–7.7)
NEUTROPHILS NFR BLD: 75.4 %
NEUTROPHILS NFR BLD: 76.7 %
PLATELET # BLD AUTO: 56 K/UL (ref 135–450)
PLATELET # BLD AUTO: 57 K/UL (ref 135–450)
PMV BLD AUTO: 8.6 FL (ref 5–10.5)
PMV BLD AUTO: 8.8 FL (ref 5–10.5)
POTASSIUM SERPL-SCNC: 4.2 MMOL/L (ref 3.5–5.1)
POTASSIUM SERPL-SCNC: 4.4 MMOL/L (ref 3.5–5.1)
PROT SERPL-MCNC: 5.1 G/DL (ref 6.4–8.2)
PROT SERPL-MCNC: 5.2 G/DL (ref 6.4–8.2)
RBC # BLD AUTO: 3.09 M/UL (ref 4–5.2)
RBC # BLD AUTO: 3.15 M/UL (ref 4–5.2)
SODIUM SERPL-SCNC: 140 MMOL/L (ref 136–145)
SODIUM SERPL-SCNC: 142 MMOL/L (ref 136–145)
WBC # BLD AUTO: 5.1 K/UL (ref 4–11)
WBC # BLD AUTO: 5.4 K/UL (ref 4–11)

## 2024-03-29 PROCEDURE — 6370000000 HC RX 637 (ALT 250 FOR IP): Performed by: INTERNAL MEDICINE

## 2024-03-29 PROCEDURE — 85025 COMPLETE CBC W/AUTO DIFF WBC: CPT

## 2024-03-29 PROCEDURE — 6370000000 HC RX 637 (ALT 250 FOR IP): Performed by: STUDENT IN AN ORGANIZED HEALTH CARE EDUCATION/TRAINING PROGRAM

## 2024-03-29 PROCEDURE — 94669 MECHANICAL CHEST WALL OSCILL: CPT

## 2024-03-29 PROCEDURE — 97116 GAIT TRAINING THERAPY: CPT | Performed by: PHYSICAL THERAPIST

## 2024-03-29 PROCEDURE — 97530 THERAPEUTIC ACTIVITIES: CPT | Performed by: PHYSICAL THERAPIST

## 2024-03-29 PROCEDURE — 6360000002 HC RX W HCPCS: Performed by: STUDENT IN AN ORGANIZED HEALTH CARE EDUCATION/TRAINING PROGRAM

## 2024-03-29 PROCEDURE — 2060000000 HC ICU INTERMEDIATE R&B

## 2024-03-29 PROCEDURE — 6360000002 HC RX W HCPCS: Performed by: INTERNAL MEDICINE

## 2024-03-29 PROCEDURE — 94760 N-INVAS EAR/PLS OXIMETRY 1: CPT

## 2024-03-29 PROCEDURE — 83735 ASSAY OF MAGNESIUM: CPT

## 2024-03-29 PROCEDURE — 36415 COLL VENOUS BLD VENIPUNCTURE: CPT

## 2024-03-29 PROCEDURE — 97530 THERAPEUTIC ACTIVITIES: CPT

## 2024-03-29 PROCEDURE — 94640 AIRWAY INHALATION TREATMENT: CPT

## 2024-03-29 PROCEDURE — 80053 COMPREHEN METABOLIC PANEL: CPT

## 2024-03-29 PROCEDURE — 2580000003 HC RX 258: Performed by: STUDENT IN AN ORGANIZED HEALTH CARE EDUCATION/TRAINING PROGRAM

## 2024-03-29 RX ORDER — LORAZEPAM 2 MG/ML
3 INJECTION INTRAMUSCULAR
Status: ACTIVE | OUTPATIENT
Start: 2024-03-29 | End: 2024-03-29

## 2024-03-29 RX ORDER — LORAZEPAM 2 MG/ML
4 INJECTION INTRAMUSCULAR
Status: ACTIVE | OUTPATIENT
Start: 2024-03-29 | End: 2024-03-29

## 2024-03-29 RX ORDER — LORAZEPAM 1 MG/1
2 TABLET ORAL
Status: ACTIVE | OUTPATIENT
Start: 2024-03-29 | End: 2024-03-29

## 2024-03-29 RX ORDER — LORAZEPAM 1 MG/1
1 TABLET ORAL
Status: ACTIVE | OUTPATIENT
Start: 2024-03-29 | End: 2024-03-29

## 2024-03-29 RX ORDER — CYCLOBENZAPRINE HCL 10 MG
10 TABLET ORAL 3 TIMES DAILY PRN
Status: DISCONTINUED | OUTPATIENT
Start: 2024-03-29 | End: 2024-03-30

## 2024-03-29 RX ORDER — LORAZEPAM 1 MG/1
4 TABLET ORAL
Status: ACTIVE | OUTPATIENT
Start: 2024-03-29 | End: 2024-03-29

## 2024-03-29 RX ORDER — MAGNESIUM SULFATE IN WATER 40 MG/ML
2000 INJECTION, SOLUTION INTRAVENOUS ONCE
Status: COMPLETED | OUTPATIENT
Start: 2024-03-29 | End: 2024-03-29

## 2024-03-29 RX ORDER — LORAZEPAM 2 MG/ML
2 INJECTION INTRAMUSCULAR
Status: ACTIVE | OUTPATIENT
Start: 2024-03-29 | End: 2024-03-29

## 2024-03-29 RX ORDER — LORAZEPAM 2 MG/ML
1 INJECTION INTRAMUSCULAR
Status: ACTIVE | OUTPATIENT
Start: 2024-03-29 | End: 2024-03-29

## 2024-03-29 RX ORDER — LORAZEPAM 1 MG/1
3 TABLET ORAL
Status: ACTIVE | OUTPATIENT
Start: 2024-03-29 | End: 2024-03-29

## 2024-03-29 RX ADMIN — MOMETASONE FUROATE AND FORMOTEROL FUMARATE DIHYDRATE 2 PUFF: 100; 5 AEROSOL RESPIRATORY (INHALATION) at 08:37

## 2024-03-29 RX ADMIN — LEVOTHYROXINE SODIUM 50 MCG: 0.05 TABLET ORAL at 05:15

## 2024-03-29 RX ADMIN — DIPHENHYDRAMINE HCL 10 ML: 12.5 SOLUTION ORAL at 14:49

## 2024-03-29 RX ADMIN — IPRATROPIUM BROMIDE AND ALBUTEROL 1 PUFF: 20; 100 SPRAY, METERED RESPIRATORY (INHALATION) at 12:01

## 2024-03-29 RX ADMIN — Medication 10 ML: at 09:03

## 2024-03-29 RX ADMIN — LORAZEPAM 1 MG: 1 TABLET ORAL at 05:15

## 2024-03-29 RX ADMIN — GABAPENTIN 300 MG: 300 CAPSULE ORAL at 14:38

## 2024-03-29 RX ADMIN — GABAPENTIN 300 MG: 300 CAPSULE ORAL at 09:02

## 2024-03-29 RX ADMIN — ERGOCALCIFEROL 50000 UNITS: 1.25 CAPSULE ORAL at 09:03

## 2024-03-29 RX ADMIN — Medication 10 ML: at 22:04

## 2024-03-29 RX ADMIN — BUPRENORPHINE HYDROCHLORIDE AND NALOXONE HYDROCHLORIDE DIHYDRATE 1 TABLET: 2; .5 TABLET SUBLINGUAL at 09:02

## 2024-03-29 RX ADMIN — LORAZEPAM 1 MG: 1 TABLET ORAL at 09:01

## 2024-03-29 RX ADMIN — MOMETASONE FUROATE AND FORMOTEROL FUMARATE DIHYDRATE 2 PUFF: 100; 5 AEROSOL RESPIRATORY (INHALATION) at 19:37

## 2024-03-29 RX ADMIN — ENOXAPARIN SODIUM 30 MG: 100 INJECTION SUBCUTANEOUS at 09:04

## 2024-03-29 RX ADMIN — GABAPENTIN 300 MG: 300 CAPSULE ORAL at 21:49

## 2024-03-29 RX ADMIN — IPRATROPIUM BROMIDE AND ALBUTEROL 1 PUFF: 20; 100 SPRAY, METERED RESPIRATORY (INHALATION) at 19:37

## 2024-03-29 RX ADMIN — Medication 100 MG: at 09:02

## 2024-03-29 RX ADMIN — AMIODARONE HYDROCHLORIDE 200 MG: 200 TABLET ORAL at 09:03

## 2024-03-29 RX ADMIN — MAGNESIUM SULFATE HEPTAHYDRATE 2000 MG: 40 INJECTION, SOLUTION INTRAVENOUS at 09:49

## 2024-03-29 RX ADMIN — MORPHINE SULFATE 2 MG: 2 INJECTION, SOLUTION INTRAMUSCULAR; INTRAVENOUS at 22:02

## 2024-03-29 RX ADMIN — CYCLOBENZAPRINE 10 MG: 10 TABLET, FILM COATED ORAL at 13:50

## 2024-03-29 RX ADMIN — IPRATROPIUM BROMIDE AND ALBUTEROL 1 PUFF: 20; 100 SPRAY, METERED RESPIRATORY (INHALATION) at 16:20

## 2024-03-29 RX ADMIN — DEXAMETHASONE 6 MG: 4 TABLET ORAL at 09:04

## 2024-03-29 RX ADMIN — DIPHENHYDRAMINE HCL 10 ML: 12.5 SOLUTION ORAL at 09:22

## 2024-03-29 RX ADMIN — ESCITALOPRAM OXALATE 20 MG: 10 TABLET ORAL at 09:02

## 2024-03-29 RX ADMIN — IPRATROPIUM BROMIDE AND ALBUTEROL 1 PUFF: 20; 100 SPRAY, METERED RESPIRATORY (INHALATION) at 08:38

## 2024-03-29 RX ADMIN — VALACYCLOVIR 500 MG: 500 TABLET, FILM COATED ORAL at 09:02

## 2024-03-29 RX ADMIN — ASPIRIN 81 MG: 81 TABLET, CHEWABLE ORAL at 09:03

## 2024-03-29 RX ADMIN — PANTOPRAZOLE SODIUM 40 MG: 40 TABLET, DELAYED RELEASE ORAL at 05:15

## 2024-03-29 ASSESSMENT — PAIN DESCRIPTION - DESCRIPTORS
DESCRIPTORS: ACHING
DESCRIPTORS: ACHING

## 2024-03-29 ASSESSMENT — PAIN DESCRIPTION - LOCATION: LOCATION: BACK;MOUTH

## 2024-03-29 ASSESSMENT — PAIN DESCRIPTION - ONSET: ONSET: ON-GOING

## 2024-03-29 ASSESSMENT — PAIN - FUNCTIONAL ASSESSMENT
PAIN_FUNCTIONAL_ASSESSMENT: ACTIVITIES ARE NOT PREVENTED
PAIN_FUNCTIONAL_ASSESSMENT: ACTIVITIES ARE NOT PREVENTED

## 2024-03-29 ASSESSMENT — PAIN DESCRIPTION - ORIENTATION
ORIENTATION: LOWER;RIGHT;LEFT
ORIENTATION: RIGHT;LOWER

## 2024-03-29 ASSESSMENT — PAIN SCALES - WONG BAKER
WONGBAKER_NUMERICALRESPONSE: NO HURT
WONGBAKER_NUMERICALRESPONSE: NO HURT

## 2024-03-29 ASSESSMENT — PAIN DESCRIPTION - PAIN TYPE: TYPE: CHRONIC PAIN

## 2024-03-29 ASSESSMENT — PAIN SCALES - GENERAL
PAINLEVEL_OUTOF10: 8
PAINLEVEL_OUTOF10: 8
PAINLEVEL_OUTOF10: 9

## 2024-03-29 ASSESSMENT — PAIN DESCRIPTION - FREQUENCY: FREQUENCY: CONTINUOUS

## 2024-03-30 PROBLEM — U07.1 COVID: Status: ACTIVE | Noted: 2024-03-30

## 2024-03-30 LAB
ALBUMIN SERPL-MCNC: 3.1 G/DL (ref 3.4–5)
ALBUMIN/GLOB SERPL: 1.4 {RATIO} (ref 1.1–2.2)
ALP SERPL-CCNC: 130 U/L (ref 40–129)
ALT SERPL-CCNC: 50 U/L (ref 10–40)
ANION GAP SERPL CALCULATED.3IONS-SCNC: 8 MMOL/L (ref 3–16)
AST SERPL-CCNC: 31 U/L (ref 15–37)
BASOPHILS # BLD: 0 K/UL (ref 0–0.2)
BASOPHILS NFR BLD: 0.1 %
BILIRUB SERPL-MCNC: 0.5 MG/DL (ref 0–1)
BUN SERPL-MCNC: 12 MG/DL (ref 7–20)
CALCIUM SERPL-MCNC: 8.4 MG/DL (ref 8.3–10.6)
CHLORIDE SERPL-SCNC: 102 MMOL/L (ref 99–110)
CO2 SERPL-SCNC: 29 MMOL/L (ref 21–32)
CREAT SERPL-MCNC: 0.6 MG/DL (ref 0.6–1.1)
DEPRECATED RDW RBC AUTO: 22.3 % (ref 12.4–15.4)
EOSINOPHIL # BLD: 0 K/UL (ref 0–0.6)
EOSINOPHIL NFR BLD: 0.2 %
GFR SERPLBLD CREATININE-BSD FMLA CKD-EPI: >90 ML/MIN/{1.73_M2}
GLUCOSE SERPL-MCNC: 125 MG/DL (ref 70–99)
HCT VFR BLD AUTO: 26.5 % (ref 36–48)
HGB BLD-MCNC: 8.5 G/DL (ref 12–16)
LYMPHOCYTES # BLD: 0.4 K/UL (ref 1–5.1)
LYMPHOCYTES NFR BLD: 8.4 %
MAGNESIUM SERPL-MCNC: 1.7 MG/DL (ref 1.8–2.4)
MCH RBC QN AUTO: 28.1 PG (ref 26–34)
MCHC RBC AUTO-ENTMCNC: 32.1 G/DL (ref 31–36)
MCV RBC AUTO: 87.7 FL (ref 80–100)
MONOCYTES # BLD: 0.4 K/UL (ref 0–1.3)
MONOCYTES NFR BLD: 9 %
NEUTROPHILS # BLD: 4 K/UL (ref 1.7–7.7)
NEUTROPHILS NFR BLD: 82.3 %
PLATELET # BLD AUTO: 63 K/UL (ref 135–450)
PMV BLD AUTO: 9 FL (ref 5–10.5)
POTASSIUM SERPL-SCNC: 3.7 MMOL/L (ref 3.5–5.1)
PROT SERPL-MCNC: 5.3 G/DL (ref 6.4–8.2)
RBC # BLD AUTO: 3.02 M/UL (ref 4–5.2)
SODIUM SERPL-SCNC: 139 MMOL/L (ref 136–145)
TROPONIN, HIGH SENSITIVITY: 10 NG/L (ref 0–14)
WBC # BLD AUTO: 4.9 K/UL (ref 4–11)

## 2024-03-30 PROCEDURE — 80053 COMPREHEN METABOLIC PANEL: CPT

## 2024-03-30 PROCEDURE — 6370000000 HC RX 637 (ALT 250 FOR IP): Performed by: STUDENT IN AN ORGANIZED HEALTH CARE EDUCATION/TRAINING PROGRAM

## 2024-03-30 PROCEDURE — 6370000000 HC RX 637 (ALT 250 FOR IP): Performed by: INTERNAL MEDICINE

## 2024-03-30 PROCEDURE — 85025 COMPLETE CBC W/AUTO DIFF WBC: CPT

## 2024-03-30 PROCEDURE — 6360000002 HC RX W HCPCS: Performed by: STUDENT IN AN ORGANIZED HEALTH CARE EDUCATION/TRAINING PROGRAM

## 2024-03-30 PROCEDURE — 84484 ASSAY OF TROPONIN QUANT: CPT

## 2024-03-30 PROCEDURE — 94669 MECHANICAL CHEST WALL OSCILL: CPT

## 2024-03-30 PROCEDURE — 2060000000 HC ICU INTERMEDIATE R&B

## 2024-03-30 PROCEDURE — 6360000002 HC RX W HCPCS: Performed by: INTERNAL MEDICINE

## 2024-03-30 PROCEDURE — 99233 SBSQ HOSP IP/OBS HIGH 50: CPT | Performed by: INTERNAL MEDICINE

## 2024-03-30 PROCEDURE — 94760 N-INVAS EAR/PLS OXIMETRY 1: CPT

## 2024-03-30 PROCEDURE — 83735 ASSAY OF MAGNESIUM: CPT

## 2024-03-30 PROCEDURE — 2580000003 HC RX 258: Performed by: STUDENT IN AN ORGANIZED HEALTH CARE EDUCATION/TRAINING PROGRAM

## 2024-03-30 PROCEDURE — 93005 ELECTROCARDIOGRAM TRACING: CPT | Performed by: INTERNAL MEDICINE

## 2024-03-30 PROCEDURE — 94640 AIRWAY INHALATION TREATMENT: CPT

## 2024-03-30 RX ORDER — LIDOCAINE 4 G/G
2 PATCH TOPICAL DAILY
Status: DISCONTINUED | OUTPATIENT
Start: 2024-03-30 | End: 2024-04-01 | Stop reason: HOSPADM

## 2024-03-30 RX ORDER — MAGNESIUM SULFATE IN WATER 40 MG/ML
4000 INJECTION, SOLUTION INTRAVENOUS ONCE
Status: COMPLETED | OUTPATIENT
Start: 2024-03-30 | End: 2024-03-30

## 2024-03-30 RX ORDER — NICOTINE 21 MG/24HR
1 PATCH, TRANSDERMAL 24 HOURS TRANSDERMAL DAILY
Status: DISCONTINUED | OUTPATIENT
Start: 2024-03-30 | End: 2024-04-01 | Stop reason: HOSPADM

## 2024-03-30 RX ORDER — POTASSIUM CHLORIDE 20 MEQ/1
20 TABLET, EXTENDED RELEASE ORAL DAILY
Status: DISCONTINUED | OUTPATIENT
Start: 2024-03-30 | End: 2024-03-31

## 2024-03-30 RX ORDER — MORPHINE SULFATE 2 MG/ML
1 INJECTION, SOLUTION INTRAMUSCULAR; INTRAVENOUS EVERY 4 HOURS PRN
Status: DISCONTINUED | OUTPATIENT
Start: 2024-03-30 | End: 2024-04-01 | Stop reason: HOSPADM

## 2024-03-30 RX ORDER — CYCLOBENZAPRINE HCL 10 MG
10 TABLET ORAL 3 TIMES DAILY
Status: DISCONTINUED | OUTPATIENT
Start: 2024-03-30 | End: 2024-04-01 | Stop reason: HOSPADM

## 2024-03-30 RX ADMIN — CYCLOBENZAPRINE 10 MG: 10 TABLET, FILM COATED ORAL at 10:00

## 2024-03-30 RX ADMIN — IPRATROPIUM BROMIDE AND ALBUTEROL 1 PUFF: 20; 100 SPRAY, METERED RESPIRATORY (INHALATION) at 08:32

## 2024-03-30 RX ADMIN — ENOXAPARIN SODIUM 30 MG: 100 INJECTION SUBCUTANEOUS at 08:15

## 2024-03-30 RX ADMIN — PANTOPRAZOLE SODIUM 40 MG: 40 TABLET, DELAYED RELEASE ORAL at 05:14

## 2024-03-30 RX ADMIN — Medication 10 ML: at 21:07

## 2024-03-30 RX ADMIN — DIPHENHYDRAMINE HCL 10 ML: 12.5 SOLUTION ORAL at 08:19

## 2024-03-30 RX ADMIN — Medication 10 ML: at 08:37

## 2024-03-30 RX ADMIN — GABAPENTIN 300 MG: 300 CAPSULE ORAL at 21:01

## 2024-03-30 RX ADMIN — VALACYCLOVIR 500 MG: 500 TABLET, FILM COATED ORAL at 08:15

## 2024-03-30 RX ADMIN — MORPHINE SULFATE 2 MG: 2 INJECTION, SOLUTION INTRAMUSCULAR; INTRAVENOUS at 05:13

## 2024-03-30 RX ADMIN — PROCHLORPERAZINE EDISYLATE 10 MG: 5 INJECTION INTRAMUSCULAR; INTRAVENOUS at 21:28

## 2024-03-30 RX ADMIN — IPRATROPIUM BROMIDE AND ALBUTEROL 1 PUFF: 20; 100 SPRAY, METERED RESPIRATORY (INHALATION) at 16:24

## 2024-03-30 RX ADMIN — GABAPENTIN 300 MG: 300 CAPSULE ORAL at 08:15

## 2024-03-30 RX ADMIN — GABAPENTIN 300 MG: 300 CAPSULE ORAL at 14:33

## 2024-03-30 RX ADMIN — CYCLOBENZAPRINE 10 MG: 10 TABLET, FILM COATED ORAL at 13:49

## 2024-03-30 RX ADMIN — POLYETHYLENE GLYCOL 3350 17 G: 17 POWDER, FOR SOLUTION ORAL at 10:00

## 2024-03-30 RX ADMIN — DIPHENHYDRAMINE HCL 10 ML: 12.5 SOLUTION ORAL at 21:29

## 2024-03-30 RX ADMIN — SODIUM CHLORIDE: 9 INJECTION, SOLUTION INTRAVENOUS at 10:05

## 2024-03-30 RX ADMIN — CYCLOBENZAPRINE 10 MG: 10 TABLET, FILM COATED ORAL at 21:01

## 2024-03-30 RX ADMIN — MAGNESIUM SULFATE HEPTAHYDRATE 4000 MG: 40 INJECTION, SOLUTION INTRAVENOUS at 10:10

## 2024-03-30 RX ADMIN — AMIODARONE HYDROCHLORIDE 200 MG: 200 TABLET ORAL at 08:15

## 2024-03-30 RX ADMIN — DIPHENHYDRAMINE HCL 10 ML: 12.5 SOLUTION ORAL at 14:23

## 2024-03-30 RX ADMIN — MOMETASONE FUROATE AND FORMOTEROL FUMARATE DIHYDRATE 2 PUFF: 100; 5 AEROSOL RESPIRATORY (INHALATION) at 08:34

## 2024-03-30 RX ADMIN — BUPRENORPHINE HYDROCHLORIDE AND NALOXONE HYDROCHLORIDE DIHYDRATE 1 TABLET: 2; .5 TABLET SUBLINGUAL at 08:15

## 2024-03-30 RX ADMIN — Medication 100 MG: at 08:15

## 2024-03-30 RX ADMIN — DEXAMETHASONE 6 MG: 4 TABLET ORAL at 08:15

## 2024-03-30 RX ADMIN — MORPHINE SULFATE 1 MG: 2 INJECTION, SOLUTION INTRAMUSCULAR; INTRAVENOUS at 10:01

## 2024-03-30 RX ADMIN — LEVOTHYROXINE SODIUM 50 MCG: 0.05 TABLET ORAL at 05:14

## 2024-03-30 RX ADMIN — POTASSIUM CHLORIDE 20 MEQ: 1500 TABLET, EXTENDED RELEASE ORAL at 13:49

## 2024-03-30 RX ADMIN — PROCHLORPERAZINE EDISYLATE 10 MG: 5 INJECTION INTRAMUSCULAR; INTRAVENOUS at 14:23

## 2024-03-30 RX ADMIN — IPRATROPIUM BROMIDE AND ALBUTEROL 1 PUFF: 20; 100 SPRAY, METERED RESPIRATORY (INHALATION) at 20:58

## 2024-03-30 RX ADMIN — IPRATROPIUM BROMIDE AND ALBUTEROL 1 PUFF: 20; 100 SPRAY, METERED RESPIRATORY (INHALATION) at 12:37

## 2024-03-30 RX ADMIN — Medication 10 ML: at 10:02

## 2024-03-30 RX ADMIN — ASPIRIN 81 MG: 81 TABLET, CHEWABLE ORAL at 08:15

## 2024-03-30 RX ADMIN — ESCITALOPRAM OXALATE 20 MG: 10 TABLET ORAL at 08:15

## 2024-03-30 RX ADMIN — MOMETASONE FUROATE AND FORMOTEROL FUMARATE DIHYDRATE 2 PUFF: 100; 5 AEROSOL RESPIRATORY (INHALATION) at 20:58

## 2024-03-30 ASSESSMENT — PAIN DESCRIPTION - FREQUENCY
FREQUENCY: CONTINUOUS
FREQUENCY: CONTINUOUS

## 2024-03-30 ASSESSMENT — PAIN SCALES - WONG BAKER: WONGBAKER_NUMERICALRESPONSE: NO HURT

## 2024-03-30 ASSESSMENT — PAIN DESCRIPTION - LOCATION
LOCATION: MOUTH;BACK
LOCATION: BACK

## 2024-03-30 ASSESSMENT — PAIN DESCRIPTION - DESCRIPTORS
DESCRIPTORS: ACHING
DESCRIPTORS: SHARP
DESCRIPTORS: SHARP

## 2024-03-30 ASSESSMENT — PAIN SCALES - GENERAL
PAINLEVEL_OUTOF10: 5
PAINLEVEL_OUTOF10: 7
PAINLEVEL_OUTOF10: 9
PAINLEVEL_OUTOF10: 9

## 2024-03-30 ASSESSMENT — PAIN - FUNCTIONAL ASSESSMENT
PAIN_FUNCTIONAL_ASSESSMENT: ACTIVITIES ARE NOT PREVENTED

## 2024-03-30 ASSESSMENT — PAIN DESCRIPTION - PAIN TYPE
TYPE: CHRONIC PAIN
TYPE: CHRONIC PAIN

## 2024-03-30 ASSESSMENT — PAIN DESCRIPTION - ORIENTATION
ORIENTATION: RIGHT
ORIENTATION: RIGHT;LOWER

## 2024-03-30 ASSESSMENT — PAIN DESCRIPTION - ONSET
ONSET: ON-GOING
ONSET: ON-GOING

## 2024-03-31 LAB
ALBUMIN SERPL-MCNC: 2.9 G/DL (ref 3.4–5)
ALBUMIN/GLOB SERPL: 1.3 {RATIO} (ref 1.1–2.2)
ALP SERPL-CCNC: 123 U/L (ref 40–129)
ALT SERPL-CCNC: 51 U/L (ref 10–40)
ANION GAP SERPL CALCULATED.3IONS-SCNC: 7 MMOL/L (ref 3–16)
AST SERPL-CCNC: 29 U/L (ref 15–37)
BASOPHILS # BLD: 0 K/UL (ref 0–0.2)
BASOPHILS NFR BLD: 0.1 %
BILIRUB SERPL-MCNC: 0.5 MG/DL (ref 0–1)
BUN SERPL-MCNC: 9 MG/DL (ref 7–20)
CALCIUM SERPL-MCNC: 8.2 MG/DL (ref 8.3–10.6)
CHLORIDE SERPL-SCNC: 104 MMOL/L (ref 99–110)
CO2 SERPL-SCNC: 27 MMOL/L (ref 21–32)
CREAT SERPL-MCNC: 0.5 MG/DL (ref 0.6–1.1)
DEPRECATED RDW RBC AUTO: 21.9 % (ref 12.4–15.4)
EKG ATRIAL RATE: 66 BPM
EKG DIAGNOSIS: NORMAL
EKG P AXIS: 42 DEGREES
EKG P-R INTERVAL: 126 MS
EKG Q-T INTERVAL: 448 MS
EKG QRS DURATION: 80 MS
EKG QTC CALCULATION (BAZETT): 469 MS
EKG R AXIS: 51 DEGREES
EKG T AXIS: 65 DEGREES
EKG VENTRICULAR RATE: 66 BPM
EOSINOPHIL # BLD: 0 K/UL (ref 0–0.6)
EOSINOPHIL NFR BLD: 0.1 %
FERRITIN SERPL IA-MCNC: 52.2 NG/ML (ref 15–150)
GFR SERPLBLD CREATININE-BSD FMLA CKD-EPI: >90 ML/MIN/{1.73_M2}
GLUCOSE SERPL-MCNC: 114 MG/DL (ref 70–99)
HCT VFR BLD AUTO: 25.5 % (ref 36–48)
HGB BLD-MCNC: 8.3 G/DL (ref 12–16)
IRON SATN MFR SERPL: 6 % (ref 15–50)
IRON SERPL-MCNC: 18 UG/DL (ref 37–145)
LYMPHOCYTES # BLD: 0.5 K/UL (ref 1–5.1)
LYMPHOCYTES NFR BLD: 9.4 %
MAGNESIUM SERPL-MCNC: 1.8 MG/DL (ref 1.8–2.4)
MCH RBC QN AUTO: 28.1 PG (ref 26–34)
MCHC RBC AUTO-ENTMCNC: 32.4 G/DL (ref 31–36)
MCV RBC AUTO: 86.9 FL (ref 80–100)
MONOCYTES # BLD: 0.5 K/UL (ref 0–1.3)
MONOCYTES NFR BLD: 10.4 %
NEUTROPHILS # BLD: 4.1 K/UL (ref 1.7–7.7)
NEUTROPHILS NFR BLD: 80 %
PLATELET # BLD AUTO: 67 K/UL (ref 135–450)
PMV BLD AUTO: 8.8 FL (ref 5–10.5)
POTASSIUM SERPL-SCNC: 3.9 MMOL/L (ref 3.5–5.1)
PROT SERPL-MCNC: 5.1 G/DL (ref 6.4–8.2)
RBC # BLD AUTO: 2.94 M/UL (ref 4–5.2)
SODIUM SERPL-SCNC: 138 MMOL/L (ref 136–145)
TIBC SERPL-MCNC: 313 UG/DL (ref 260–445)
WBC # BLD AUTO: 5.1 K/UL (ref 4–11)

## 2024-03-31 PROCEDURE — 93010 ELECTROCARDIOGRAM REPORT: CPT | Performed by: INTERNAL MEDICINE

## 2024-03-31 PROCEDURE — 83550 IRON BINDING TEST: CPT

## 2024-03-31 PROCEDURE — 6370000000 HC RX 637 (ALT 250 FOR IP): Performed by: STUDENT IN AN ORGANIZED HEALTH CARE EDUCATION/TRAINING PROGRAM

## 2024-03-31 PROCEDURE — 6370000000 HC RX 637 (ALT 250 FOR IP): Performed by: INTERNAL MEDICINE

## 2024-03-31 PROCEDURE — 82728 ASSAY OF FERRITIN: CPT

## 2024-03-31 PROCEDURE — 83735 ASSAY OF MAGNESIUM: CPT

## 2024-03-31 PROCEDURE — 6360000002 HC RX W HCPCS: Performed by: STUDENT IN AN ORGANIZED HEALTH CARE EDUCATION/TRAINING PROGRAM

## 2024-03-31 PROCEDURE — 2580000003 HC RX 258: Performed by: STUDENT IN AN ORGANIZED HEALTH CARE EDUCATION/TRAINING PROGRAM

## 2024-03-31 PROCEDURE — 2060000000 HC ICU INTERMEDIATE R&B

## 2024-03-31 PROCEDURE — 6360000002 HC RX W HCPCS: Performed by: INTERNAL MEDICINE

## 2024-03-31 PROCEDURE — 94760 N-INVAS EAR/PLS OXIMETRY 1: CPT

## 2024-03-31 PROCEDURE — 83540 ASSAY OF IRON: CPT

## 2024-03-31 PROCEDURE — 94669 MECHANICAL CHEST WALL OSCILL: CPT

## 2024-03-31 PROCEDURE — 99232 SBSQ HOSP IP/OBS MODERATE 35: CPT | Performed by: INTERNAL MEDICINE

## 2024-03-31 PROCEDURE — 85025 COMPLETE CBC W/AUTO DIFF WBC: CPT

## 2024-03-31 PROCEDURE — 94640 AIRWAY INHALATION TREATMENT: CPT

## 2024-03-31 PROCEDURE — 80053 COMPREHEN METABOLIC PANEL: CPT

## 2024-03-31 RX ORDER — LORAZEPAM 0.5 MG/1
0.5 TABLET ORAL
Status: COMPLETED | OUTPATIENT
Start: 2024-03-31 | End: 2024-03-31

## 2024-03-31 RX ORDER — AMIODARONE HYDROCHLORIDE 200 MG/1
200 TABLET ORAL ONCE
Status: COMPLETED | OUTPATIENT
Start: 2024-03-31 | End: 2024-03-31

## 2024-03-31 RX ORDER — POTASSIUM CHLORIDE 20 MEQ/1
40 TABLET, EXTENDED RELEASE ORAL DAILY
Status: DISCONTINUED | OUTPATIENT
Start: 2024-03-31 | End: 2024-04-01 | Stop reason: HOSPADM

## 2024-03-31 RX ORDER — MAGNESIUM SULFATE IN WATER 40 MG/ML
2000 INJECTION, SOLUTION INTRAVENOUS ONCE
Status: COMPLETED | OUTPATIENT
Start: 2024-03-31 | End: 2024-03-31

## 2024-03-31 RX ADMIN — VALACYCLOVIR 500 MG: 500 TABLET, FILM COATED ORAL at 10:00

## 2024-03-31 RX ADMIN — Medication 10 ML: at 09:52

## 2024-03-31 RX ADMIN — AMIODARONE HYDROCHLORIDE 200 MG: 200 TABLET ORAL at 09:47

## 2024-03-31 RX ADMIN — Medication 10 ML: at 20:12

## 2024-03-31 RX ADMIN — DEXAMETHASONE 6 MG: 4 TABLET ORAL at 09:45

## 2024-03-31 RX ADMIN — DIPHENHYDRAMINE HCL 10 ML: 12.5 SOLUTION ORAL at 20:20

## 2024-03-31 RX ADMIN — IPRATROPIUM BROMIDE AND ALBUTEROL 1 PUFF: 20; 100 SPRAY, METERED RESPIRATORY (INHALATION) at 09:07

## 2024-03-31 RX ADMIN — MAGNESIUM SULFATE HEPTAHYDRATE 2000 MG: 40 INJECTION, SOLUTION INTRAVENOUS at 09:55

## 2024-03-31 RX ADMIN — LORAZEPAM 0.5 MG: 0.5 TABLET ORAL at 17:51

## 2024-03-31 RX ADMIN — PANTOPRAZOLE SODIUM 40 MG: 40 TABLET, DELAYED RELEASE ORAL at 06:47

## 2024-03-31 RX ADMIN — CYCLOBENZAPRINE 10 MG: 10 TABLET, FILM COATED ORAL at 20:00

## 2024-03-31 RX ADMIN — AMIODARONE HYDROCHLORIDE 200 MG: 200 TABLET ORAL at 16:17

## 2024-03-31 RX ADMIN — Medication 100 MG: at 09:48

## 2024-03-31 RX ADMIN — LEVOTHYROXINE SODIUM 50 MCG: 0.05 TABLET ORAL at 06:47

## 2024-03-31 RX ADMIN — ENOXAPARIN SODIUM 30 MG: 100 INJECTION SUBCUTANEOUS at 20:01

## 2024-03-31 RX ADMIN — POTASSIUM CHLORIDE 40 MEQ: 1500 TABLET, EXTENDED RELEASE ORAL at 09:47

## 2024-03-31 RX ADMIN — BUPRENORPHINE HYDROCHLORIDE AND NALOXONE HYDROCHLORIDE DIHYDRATE 1 TABLET: 2; .5 TABLET SUBLINGUAL at 09:48

## 2024-03-31 RX ADMIN — HYDROXYZINE HYDROCHLORIDE 25 MG: 10 TABLET, FILM COATED ORAL at 13:24

## 2024-03-31 RX ADMIN — GABAPENTIN 300 MG: 300 CAPSULE ORAL at 16:17

## 2024-03-31 RX ADMIN — PROCHLORPERAZINE EDISYLATE 10 MG: 5 INJECTION INTRAMUSCULAR; INTRAVENOUS at 06:47

## 2024-03-31 RX ADMIN — ASPIRIN 81 MG: 81 TABLET, CHEWABLE ORAL at 09:48

## 2024-03-31 RX ADMIN — GABAPENTIN 300 MG: 300 CAPSULE ORAL at 20:00

## 2024-03-31 RX ADMIN — CYCLOBENZAPRINE 10 MG: 10 TABLET, FILM COATED ORAL at 09:47

## 2024-03-31 RX ADMIN — IRON SUCROSE 200 MG: 20 INJECTION, SOLUTION INTRAVENOUS at 16:17

## 2024-03-31 RX ADMIN — ESCITALOPRAM OXALATE 20 MG: 10 TABLET ORAL at 09:47

## 2024-03-31 RX ADMIN — CYCLOBENZAPRINE 10 MG: 10 TABLET, FILM COATED ORAL at 13:24

## 2024-03-31 RX ADMIN — DIPHENHYDRAMINE HCL 10 ML: 12.5 SOLUTION ORAL at 10:01

## 2024-03-31 RX ADMIN — GABAPENTIN 300 MG: 300 CAPSULE ORAL at 09:48

## 2024-03-31 RX ADMIN — MOMETASONE FUROATE AND FORMOTEROL FUMARATE DIHYDRATE 2 PUFF: 100; 5 AEROSOL RESPIRATORY (INHALATION) at 09:07

## 2024-03-31 ASSESSMENT — PAIN SCALES - GENERAL
PAINLEVEL_OUTOF10: 5
PAINLEVEL_OUTOF10: 6
PAINLEVEL_OUTOF10: 5
PAINLEVEL_OUTOF10: 5
PAINLEVEL_OUTOF10: 3

## 2024-03-31 ASSESSMENT — PAIN DESCRIPTION - LOCATION
LOCATION: BACK
LOCATION: NECK
LOCATION: NECK

## 2024-03-31 ASSESSMENT — PAIN SCALES - WONG BAKER: WONGBAKER_NUMERICALRESPONSE: NO HURT

## 2024-03-31 ASSESSMENT — PAIN - FUNCTIONAL ASSESSMENT
PAIN_FUNCTIONAL_ASSESSMENT: ACTIVITIES ARE NOT PREVENTED

## 2024-03-31 ASSESSMENT — PAIN DESCRIPTION - PAIN TYPE: TYPE: CHRONIC PAIN

## 2024-03-31 ASSESSMENT — PAIN DESCRIPTION - DESCRIPTORS
DESCRIPTORS: DISCOMFORT
DESCRIPTORS: ACHING
DESCRIPTORS: ACHING

## 2024-03-31 ASSESSMENT — PAIN DESCRIPTION - ORIENTATION
ORIENTATION: LOWER
ORIENTATION: LOWER
ORIENTATION: RIGHT

## 2024-03-31 ASSESSMENT — PAIN DESCRIPTION - ONSET: ONSET: ON-GOING

## 2024-03-31 ASSESSMENT — PAIN DESCRIPTION - FREQUENCY: FREQUENCY: CONTINUOUS

## 2024-03-31 NOTE — PROGRESS NOTES
Pulmonary Critical Care Consult Note     Patient's name:  Lali Cazares  Medical Record Number: [id number]  Patient's account/billing number: [id number]  Patient's YOB: 1975  Age: 48 y.o.  Date of Admission: 3/22/2024 10:17 PM  Date of Consult: 3/25/2024      Primary Care Physician: Romi Denis MD      Code Status: Full Code    Reason for consult: Acute respiratory failure hypoxia    Assessment and Plan     Acute respiratory failure with hypoxia less than 90% on room air  Covid ARDS  Ventricular tachycardia  Hypomagnesemia and hypokalemia  Polysubstance abuse      Plan:  Titrate oxygen to keep sats more than 90%  On amiodarone  Dexamethasone bid   Aspiration precautions   Monitor Pct      HISTORY OF PRESENT ILLNESS:   Mr./Ms. Lali Cazares is a 48 y.o. lady with past medical history stated below significant for alcohol abuse, polysubstance abuse, mood disorder, hepatitis C, history of prior V. tach on amiodarone and prolonged QT  Presented to the emergency department with multiple complaints including fatigue chest tightness  Chest x-ray with diffuse bilateral hazy airspace disease    Most recent echocardiogram February 2024 with no abnormalities.        REVIEW OF SYSTEMS:  Review of Systems -   General ROS: Fatigue  Psychological ROS: negative  Ophthalmic ROS: negative  ENT ROS: negative  Allergy and Immunology ROS: negative  Hematological and Lymphatic ROS: negative  Endocrine ROS: negative  Breast ROS: negative  Respiratory ROS: Cough shortness of breath  Cardiovascular ROS: No active chest pain gastrointestinal ROS:negative  Genito-Urinary ROS: negative  Musculoskeletal ROS: negative  Neurological ROS: negative  Dermatological ROS: negative        Physical Exam:    Vitals: BP (!) 158/78   Pulse 75   Temp 99.1 °F (37.3 °C) (Oral)   Resp 18   Ht 1.702 m (5' 7\")   Wt 69 kg (152 lb 1.9 oz)   SpO2 98%   BMI 23.82 kg/m² 
                                                Pulmonary Critical Care Consult Note     Patient's name:  Lali Cazares  Medical Record Number: [id number]  Patient's account/billing number: [id number]  Patient's YOB: 1975  Age: 48 y.o.  Date of Admission: 3/22/2024 10:17 PM  Date of Consult: 3/27/2024      Primary Care Physician: Romi Denis MD      Code Status: Full Code    Reason for consult: Acute respiratory failure hypoxia    Assessment and Plan     Acute respiratory failure with hypoxia less than 90% on room air  Covid ARDS  Ventricular tachycardia  Hypomagnesemia and hypokalemia  Polysubstance abuse      Plan:  Titrate oxygen to keep sats more than 90%  Dexamethasone, daily give total of 10 days   CXR with significant improvement       HISTORY OF PRESENT ILLNESS:   Mr./Ms. Lali Cazares is a 48 y.o. lady with past medical history stated below significant for alcohol abuse, polysubstance abuse, mood disorder, hepatitis C, history of prior V. tach on amiodarone and prolonged QT  Presented to the emergency department with multiple complaints including fatigue chest tightness  Chest x-ray with diffuse bilateral hazy airspace disease    Most recent echocardiogram February 2024 with no abnormalities.        REVIEW OF SYSTEMS:  Review of Systems -   General ROS: Fatigue  Psychological ROS: negative  Ophthalmic ROS: negative  ENT ROS: negative  Allergy and Immunology ROS: negative  Hematological and Lymphatic ROS: negative  Endocrine ROS: negative  Breast ROS: negative  Respiratory ROS: Cough shortness of breath  Cardiovascular ROS: No active chest pain gastrointestinal ROS:negative  Genito-Urinary ROS: negative  Musculoskeletal ROS: negative  Neurological ROS: negative  Dermatological ROS: negative        Physical Exam:    Vitals: BP (!) 138/96   Pulse 75   Temp 100.3 °F (37.9 °C) (Oral)   Resp 18   Ht 1.702 m (5' 7\")   Wt 64.4 kg (141 lb 15.6 oz)   SpO2 91%   BMI 22.24 
                                                Pulmonary Critical Care Consult Note     Patient's name:  Lali Cazares  Medical Record Number: [id number]  Patient's account/billing number: [id number]  Patient's YOB: 1975  Age: 48 y.o.  Date of Admission: 3/22/2024 10:17 PM  Date of Consult: 3/24/2024      Primary Care Physician: Romi Denis MD      Code Status: Full Code    Reason for consult: Acute respiratory failure hypoxia    Assessment and Plan     Acute respiratory failure with hypoxia less than 90% on room air  Ventricular tachycardia  Hypomagnesemia and hypokalemia  COVID-19 infection  Polysubstance abuse      Plan:  Titrate oxygen to keep sats more than 90%  Amiodarone infusion  Dexamethasone bid   Aspiration precautions   Monitor Pct, if elevated with cover       HISTORY OF PRESENT ILLNESS:   MrMendoza/Ms. Lali Cazares is a 48 y.o. lady with past medical history stated below significant for alcohol abuse, polysubstance abuse, mood disorder, hepatitis C, history of prior V. tach on amiodarone and prolonged QT  Presented to the emergency department with multiple complaints including fatigue chest tightness  Chest x-ray with diffuse bilateral hazy airspace disease    Most recent echocardiogram February 2024 with no abnormalities.        REVIEW OF SYSTEMS:  Review of Systems -   General ROS: Fatigue  Psychological ROS: negative  Ophthalmic ROS: negative  ENT ROS: negative  Allergy and Immunology ROS: negative  Hematological and Lymphatic ROS: negative  Endocrine ROS: negative  Breast ROS: negative  Respiratory ROS: Cough shortness of breath  Cardiovascular ROS: No active chest pain gastrointestinal ROS:negative  Genito-Urinary ROS: negative  Musculoskeletal ROS: negative  Neurological ROS: negative  Dermatological ROS: negative        Physical Exam:    Vitals: /75   Pulse 85   Temp 99 °F (37.2 °C) (Oral)   Resp 18   Ht 1.702 m (5' 7\")   Wt 61.9 kg (136 lb 7.4 oz)   
      [facility]   Daily Progress Note      Admit Date:  3/22/2024    CC: \"I feel fine today  Lali Cazares is a 48 y.o. female with history noted below who presented with chest pain and noted to be in V-tach with a prolonged Qtc. Pt also had 1 week of dry cough, MONTEZ but no orthopnea, PND or pitting edema.      Interval history 3/30/2024  Patient had few runs of polymorphic VT on the monitor requiring repeat cardiac evaluation:  Pt with no acute overnight events. Denies chest pain, palpitations, and dyspnea.    Interval history 3/31/2024  Patient has 1 short run of nonsustained VT but overall shows improvement  -Potassium is 4.2 today and magnesium is 1.7 and is being replaced    Objective:   /81   Pulse 57   Temp 98.2 °F (36.8 °C) (Oral)   Resp 23   Ht 1.702 m (5' 7.01\")   Wt 64.1 kg (141 lb 5 oz)   SpO2 93%   BMI 22.13 kg/m²     Intake/Output Summary (Last 24 hours) at 3/31/2024 1222  Last data filed at 3/31/2024 0952  Gross per 24 hour   Intake 1075.34 ml   Output --   Net 1075.34 ml     Wt Readings from Last 3 Encounters:   03/30/24 64.1 kg (141 lb 5 oz)   02/26/24 60 kg (132 lb 4.4 oz)   02/15/24 60 kg (132 lb 4.4 oz)     Telemetry: Personally reviewed.  Normal sinus rhythm with few runs of nonsustained VT    Physical Exam:  General:  NAD, Awake, alert and oriented X4  Skin:  Warm and dry  Neck:  Supple, no JVP appreciated, no bruit  Chest:  Clear to auscultation, no wheezes/rhonchi/rales  Cardiovascular:  Regular rate. S1S2  Abdomen:  Soft, nontender, +bowel sounds  Extremities:  No LE edema    Cardiac Diagnosis:  None    Medications:    potassium chloride  40 mEq Oral Daily    cyclobenzaprine  10 mg Oral TID    lidocaine  2 patch TransDERmal Daily    nicotine  1 patch TransDERmal Daily    dexAMETHasone  6 mg Oral Daily    albuterol-ipratropium  1 puff Inhalation 4x Daily RT    sodium chloride flush  5-40 mL IntraVENous 2 times per day    thiamine  100 mg Oral Daily    enoxaparin  
      [facility]   Daily Progress Note      Admit Date:  3/22/2024    CC: \"I feel fine today  Lali Cazares is a 48 y.o. female with history noted below who presented with chest pain and noted to be in V-tach with a prolonged Qtc. Pt also had 1 week of dry cough, MONTEZ but no orthopnea, PND or pitting edema.      Interval history 3/30/2024  Patient had few runs of polymorphic VT on the monitor requiring repeat cardiac evaluation:  Pt with no acute overnight events. Denies chest pain, palpitations, and dyspnea.    Objective:   /84   Pulse 80   Temp 98.9 °F (37.2 °C) (Oral)   Resp 18   Ht 1.702 m (5' 7.01\")   Wt 64.1 kg (141 lb 5 oz)   SpO2 95%   BMI 22.13 kg/m²     Intake/Output Summary (Last 24 hours) at 3/30/2024 1230  Last data filed at 3/30/2024 1000  Gross per 24 hour   Intake 640 ml   Output --   Net 640 ml     Wt Readings from Last 3 Encounters:   03/30/24 64.1 kg (141 lb 5 oz)   02/26/24 60 kg (132 lb 4.4 oz)   02/15/24 60 kg (132 lb 4.4 oz)     Telemetry: Personally reviewed.  Normal sinus rhythm with few runs of nonsustained VT    Physical Exam:  General:  NAD, Awake, alert and oriented X4  Skin:  Warm and dry  Neck:  Supple, no JVP appreciated, no bruit  Chest:  Clear to auscultation, no wheezes/rhonchi/rales  Cardiovascular:  Regular rate. S1S2  Abdomen:  Soft, nontender, +bowel sounds  Extremities:  No LE edema    Cardiac Diagnosis:  None    Medications:    magnesium sulfate  4,000 mg IntraVENous Once    cyclobenzaprine  10 mg Oral TID    lidocaine  2 patch TransDERmal Daily    potassium chloride  20 mEq Oral Daily    dexAMETHasone  6 mg Oral Daily    albuterol-ipratropium  1 puff Inhalation 4x Daily RT    sodium chloride flush  5-40 mL IntraVENous 2 times per day    thiamine  100 mg Oral Daily    enoxaparin  30 mg SubCUTAneous BID    aspirin  81 mg Oral Daily    buprenorphine-naloxone  1 tablet SubLINGual Daily    escitalopram  20 mg Oral Daily    gabapentin  300 mg Oral TID 
    V2.0    [facility] Progress Note      Name:  Lali Cazares /Age/Sex: 1975  (48 y.o. female)   MRN & CSN:  [id number] & [id number] Encounter Date/Time: 3/29/2024 8:57 AM EDT   Location:  [id number] PCP: Romi Denis MD     Attending:John Negrete MD       Hospital Day: 8      HPI :   Chief Complaint: chest pain     Lali Cazares is a 48 y.o. female who presents with  smoker, alcoholic, polysubstance abuse with pmh of mood disorder, hepatitis C, opioid dependence, V. tach on amiodarone who presents with chest pain and found to be in V. tach with a prolonged QTc.  Patient also reporting fatigue for about a week a dry cough and dyspnea on exertion but no orthopnea, PND or increased peripheral edema.  She denies any fevers, chills, nausea vomiting, diarrhea, headaches.       Subjective:   Significantly more alert today, orientation better ( fully oriented earlier as per RN however was somewhat more confused to me ).   Continue to score of CIWA however score is significantly improved.   Complaining of back pain.   Review of Systems:      Pertinent positives and negatives discussed in HPI    Objective:     Intake/Output Summary (Last 24 hours) at 3/29/2024 0959  Last data filed at 3/29/2024 0928  Gross per 24 hour   Intake 570 ml   Output --   Net 570 ml        Vitals:   Vitals:    24 0356 24 0751 24 0841 24 0842   BP: 138/88 (!) 142/82     Pulse: 73 70 78 76   Resp: 18 18     Temp: 98.3 °F (36.8 °C) 98.8 °F (37.1 °C)     TempSrc: Oral Oral     SpO2: 95% 94% 96% 96%   Weight: 64.4 kg (141 lb 15.6 oz)      Height:             Physical Exam:      Physical Exam Performed:    BP (!) 142/82   Pulse 76   Temp 98.8 °F (37.1 °C) (Oral)   Resp 18   Ht 1.702 m (5' 7\")   Wt 64.4 kg (141 lb 15.6 oz)   SpO2 96%   BMI 22.24 kg/m²     General appearance:alert, oriented x2 today   HEENT: Pupils equal, round, and reactive to light. Conjunctivae/corneas clear.  Neck: Supple, 
    V2.0    [facility] [place] Progress Note      Name:  Lali Cazares /Age/Sex: 1975  (48 y.o. female)   MRN & CSN:  [id number] & [id number] Encounter Date/Time: 3/28/2024 8:57 AM EDT   Location:  [id number] PCP: Romi Denis MD     Attending:John Negrete MD       Hospital Day: 7      HPI :   Chief Complaint: chest pain     Lali Cazares is a 48 y.o. female who presents with  smoker, alcoholic, polysubstance abuse with pmh of mood disorder, hepatitis C, opioid dependence, V. tach on amiodarone who presents with chest pain and found to be in V. tach with a prolonged QTc.  Patient also reporting fatigue for about a week a dry cough and dyspnea on exertion but no orthopnea, PND or increased peripheral edema.  She denies any fevers, chills, nausea vomiting, diarrhea, headaches.       Subjective:   Continues to score on CIWA , anxiety may be contributing to score.   Lethargic at the time of exam today, reports back pain.   Weaned off oxygen.   Review of Systems:      Pertinent positives and negatives discussed in HPI    Objective:     Intake/Output Summary (Last 24 hours) at 3/28/2024 1154  Last data filed at 3/27/2024 1804  Gross per 24 hour   Intake 580 ml   Output --   Net 580 ml        Vitals:   Vitals:    24 0338 24 0452 24 0801 24 0916   BP:  (!) 173/94 (!) 143/94    Pulse:  80 73 69   Resp:  15 16    Temp:  98.8 °F (37.1 °C) 97.6 °F (36.4 °C)    TempSrc:  Axillary Axillary    SpO2:  (!) 87% 91% 90%   Weight: 66 kg (145 lb 8.1 oz)      Height:             Physical Exam:      Physical Exam Performed:    BP (!) 143/94   Pulse 69   Temp 97.6 °F (36.4 °C) (Axillary)   Resp 16   Ht 1.702 m (5' 7\")   Wt 66 kg (145 lb 8.1 oz)   SpO2 90%   BMI 22.79 kg/m²     General appearance:lethargic but answers questions and follows commands   HEENT: Pupils equal, round, and reactive to light. Conjunctivae/corneas clear.  Neck: Supple, with full range of motion. No jugular 
    V2.0    [facility] [place]      Name:  Lali Cazares /Age/Sex: 1975  (48 y.o. female)   MRN & CSN:  [id number] & [id number] Encounter Date/Time: 3/27/2024 8:57 AM EDT   Location:  [id number] PCP: Romi Denis MD     Attending:John Negrete MD       Hospital Day: 6      HPI :   Chief Complaint: chest pain     Lali Cazares is a 48 y.o. female who presents with  smoker, alcoholic, polysubstance abuse with pmh of mood disorder, hepatitis C, opioid dependence, V. tach on amiodarone who presents with chest pain and found to be in V. tach with a prolonged QTc.  Patient also reporting fatigue for about a week a dry cough and dyspnea on exertion but no orthopnea, PND or increased peripheral edema.  She denies any fevers, chills, nausea vomiting, diarrhea, headaches.       Subjective:   Continues to score on CIWA , last does yesterday evening.   More alert today.   Weaned down to 1 liter   Review of Systems:      Pertinent positives and negatives discussed in HPI    Objective:   No intake or output data in the 24 hours ending 24 0954     Vitals:   Vitals:    24 0741 24 0851 24 0857 24 0859   BP: (!) 138/96      Pulse: 75  75 75   Resp: 17 18     Temp: 100.3 °F (37.9 °C)      TempSrc: Oral      SpO2: 95%  (!) 89% 91%   Weight:       Height:             Physical Exam:      Physical Exam Performed:    BP (!) 138/96   Pulse 75   Temp 100.3 °F (37.9 °C) (Oral)   Resp 18   Ht 1.702 m (5' 7\")   Wt 64.4 kg (141 lb 15.6 oz)   SpO2 91%   BMI 22.24 kg/m²     General appearance:more alert today.   HEENT: Pupils equal, round, and reactive to light. Conjunctivae/corneas clear.  Neck: Supple, with full range of motion. No jugular venous distention. Trachea midline.  Respiratory:  Normal respiratory effort. Clear to auscultation, bilaterally without Rales/Wheezes/Rhonchi.  Cardiovascular: Regular rate and rhythm with normal S1/S2 without murmurs, rubs or 
    V2.0    [facility] [place] Progress Note      Name:  Lali Cazares /Age/Sex: 1975  (48 y.o. female)   MRN & CSN:  [id number] & [id number] Encounter Date/Time: 3/30/2024 8:57 AM EDT   Location:  [id number] PCP: Romi Denis MD     Attending:John Negrete MD       Hospital Day: 9      HPI :   Chief Complaint: chest pain     Lali Cazares is a 48 y.o. female who presents with  smoker, alcoholic, polysubstance abuse with pmh of mood disorder, hepatitis C, opioid dependence, V. tach on amiodarone who presents with chest pain and found to be in V. tach with a prolonged QTc.  Patient also reporting fatigue for about a week a dry cough and dyspnea on exertion but no orthopnea, PND or increased peripheral edema.  She denies any fevers, chills, nausea vomiting, diarrhea, headaches.       Subjective:   Off CIWA, reported feeling worse today, not ready for DC.   VT 19 beats noted, cardiology on board, mg being replaced.  Patient is also complaining of uncontrolled neck and back pain.   Alert and oriented this morning.   Review of Systems:      Pertinent positives and negatives discussed in HPI    Objective:     Intake/Output Summary (Last 24 hours) at 3/30/2024 1135  Last data filed at 3/30/2024 1000  Gross per 24 hour   Intake 640 ml   Output --   Net 640 ml        Vitals:   Vitals:    24 0840 24 0845 24 1000 24 1031   BP:       Pulse: 89      Resp: 18 16 16 18   Temp:       TempSrc:       SpO2: 96%      Weight:       Height:             Physical Exam:      Physical Exam Performed:    BP (!) 150/82   Pulse 89   Temp 99.5 °F (37.5 °C) (Oral)   Resp 18   Ht 1.702 m (5' 7.01\")   Wt 64.1 kg (141 lb 5 oz)   SpO2 96%   BMI 22.13 kg/m²     General appearance:alert, oriented x3 today   HEENT: Pupils equal, round, and reactive to light. Conjunctivae/corneas clear.  Neck: Supple, with full range of motion. No jugular venous distention. Trachea midline.  Respiratory:  Normal 
  Physician Progress Note      PATIENT:               EDUARD VIZCAINO  CSN #:                  [id number]  :                       1975  ADMIT DATE:       3/22/2024 10:17 PM  DISCH DATE:  RESPONDING  PROVIDER #:        Gerardo Serna MD          QUERY TEXT:    Pt admitted 3/22 with COVID-19 infection & chest pain due to Ventricular   tachycardia. Noted to have Diffuse ground-glass opacities throughout all lobes   of the lungs on CTA chest. ARDS noted  Please document in progress notes and   discharge summary if you are evaluating or treating any of the following:    The medical record reflects the following:  Risk Factors:  COVID-19, VEL  Clinical Indicators: Admitting CXR: Diffuse hazy bilateral airspace opacities.    Differential includes multi lobar pneumonia and ARDS.  CTA 3/23: Diffuse   ground-glass opacities throughout all lobes of the lungs with upper lobe   predominance in a peribronchial distribution.  Differential considerations   include pulmonary edema, atypical infection, hypersensitivity pneumonitis or   alveolar hemorrhage. Per ED \"Pneumonia from COVID.\" Per Pulm c/s 3/25 \"Covid   ARDS\". Attending \"Covid Infection- Decadron 6mg daily for 10-days\".  Treatment: Decadron IVP on admit, then P.O. scheduled, Sched HHN/MDI, Droplet   isolation, CXR, CTA chest, Pulm c/s, O2-4-6L  Options provided:  -- Pneumonia due to COVID-19  -- Other - I will add my own diagnosis  -- Disagree - Not applicable / Not valid  -- Disagree - Clinically unable to determine / Unknown  -- Refer to Clinical Documentation Reviewer    PROVIDER RESPONSE TEXT:    This patient has pneumonia due to COVID-19.    Query created by: Kandis Perales on 3/25/2024 11:46 AM      Electronically signed by:  Gerardo Serna MD 3/25/2024 11:50 AM          
  Physician Progress Note      PATIENT:               EDUARD VIZCAINO  CSN #:                  [id number]  :                       1975  ADMIT DATE:       3/22/2024 10:17 PM  DISCH DATE:  RESPONDING  PROVIDER #:        Umberto Pradhan MD          QUERY TEXT:    Pt admitted 3/22 with COVID-19 infection & chest pain due to Ventricular   tachycardia. Per ED \" V. tach amiodarone.  All secondary to cocaine abuse and   hypokalemia\". If possible, please document in progress notes and discharge   summary if you are evaluating and/or treating any of the following:    The medical record reflects the following:  Risk Factors: Cocaine abuse  Clinical Indicators: UDS: Cocaine. Per ED \" V. tach amiodarone.  All secondary   to cocaine abuse and hypokalemia\". Per H&P \"Polysubstance abuse; PMH    alcoholic, polysubstance abuse with pmh of mood disorder, hepatitis C, opioid   dependence, V. tach on amiodarone who presents with chest pain and found to be   in V. tach with a prolonged QTc.\"  Treatment: Amio gtt, Mag sulfate supplement, UDS, Sched Suboxone  Options provided:  -- Ventricular tachycardia secondary to Cocaine use  -- Ventricular tachycardia unrelated to Cocaine use  -- Other - I will add my own diagnosis  -- Disagree - Not applicable / Not valid  -- Disagree - Clinically unable to determine / Unknown  -- Refer to Clinical Documentation Reviewer    PROVIDER RESPONSE TEXT:    Ventricular tachycardia secondary to both electrolyte abnormalities   (hypokalemia, hypomagnesemia) and cocaine use    Query created by: Kandis Perales on 3/25/2024 11:52 AM      Electronically signed by:  Umberto Pradhan MD 3/25/2024 3:41 PM          
0152: This RN received report via telephone from Jim HARDIN in ED. All questions answered at this time.     0244: Pt arrived to unit via stretcher. She is A&Ox4, sleepy but easy to wake, sinus lizzette, 4L NC, amio gtt and fluids running on arrival.     Pt had the following belongings upon arrival: Sweater, pants, shoes, and undergarments.     Pt was educated on current treatment plan, she has no other questions at this time.     Pt is now resting comfortably in bed, bed in the lowest position, call light in reach, and bed alarm applied.       
4 Eyes Skin Assessment     NAME:  Lali Cazares  YOB: 1975  MEDICAL RECORD NUMBER:  [id number]    The patient is being assessed for  Admission    I agree that at least one RN has performed a thorough Head to Toe Skin Assessment on the patient. ALL assessment sites listed below have been assessed.      Areas assessed by both nurses:    Head, Face, Ears, Shoulders, Back, Chest, Arms, Elbows, Hands, Sacrum. Buttock, Coccyx, Ischium, Legs. Feet and Heels, and Under Medical Devices         Does the Patient have a Wound? No noted wound(s)       Waqar Prevention initiated by RN: Yes  Wound Care Orders initiated by RN: No    Pressure Injury (Stage 3,4, Unstageable, DTI, NWPT, and Complex wounds) if present, place Wound referral order by RN under : No    New Ostomies, if present place, Ostomy referral order under : No     Nurse 1 eSignature: Electronically signed by Samuel Lucas RN on 3/23/24 at 3:48 AM EDT    **SHARE this note so that the co-signing nurse can place an eSignature**    Nurse 2 eSignature: Electronically signed by Satnam Pearson RN on 3/23/24 at 3:54 AM EDT    
4 Eyes Skin Assessment     NAME:  Lali Cazares  YOB: 1975  MEDICAL RECORD NUMBER:  [id number]    The patient is being assessed for  Transfer to New Unit    I agree that at least one RN has performed a thorough Head to Toe Skin Assessment on the patient. ALL assessment sites listed below have been assessed.      Areas assessed by both nurses:    Head, Face, Ears, Shoulders, Back, Chest, Arms, Elbows, Hands, Sacrum. Buttock, Coccyx, Ischium, Legs. Feet and Heels, and Under Medical Devices         Does the Patient have a Wound? No noted wound(s)       Waqar Prevention initiated by RN: No  Wound Care Orders initiated by RN: No    Pressure Injury (Stage 3,4, Unstageable, DTI, NWPT, and Complex wounds) if present, place Wound referral order by RN under : No    New Ostomies, if present place, Ostomy referral order under : No     Nurse 1 eSignature: Electronically signed by Danita Sarakr RN on 3/23/24 at 3:01 PM EDT    **SHARE this note so that the co-signing nurse can place an eSignature**    Nurse 2 eSignature: {Esignature:[id number]}   
Arrived to place PICC line with bedside RN Danita. Pre-procedure and timeout done with RN, discussed limitations of placement and allergies. Consent confirmed. Vital signs stable. Labs, allergies, medications, and code status reviewed. No contraindications noted.     Procedure explained to pt, including the risk and benefits of the procedure. All questions answered. Pt verbalizes understanding of the procedure and states no more questions.       Pt's basilic, brachial, and cephalic are all easily collapsible with no indication for a clot. Vein selected is large enough for catheter. Pt tolerated sterile procedure well, with no difficulty accessing right basilic vein, when accessed - blood was free flowing and non-pulsatile. Guidewire, introducer, and catheter went in smoothly. PICC line verified with 3CG technology with peaked P-waves (please see image below).      Nurses:  OK to use PICC.    Please replace all existing IV tubing with new IV tubing prior to using the PICC for current IV infusions.  Please remove any PIVs from PICC arm.  All of the above may be sources of infection or an increase chance of a clot.      Post procedure - reorganized pt table, placed pt in lowest position, with call light and educated on line care. Instructed pt/RN not to use arm for at least 30min to avoid bleeding. Reported off to bedside RN.      If you have any questions please call number below and dispatch will direct you to the PICC RN that is on call.    [phone]              
[facility] called; patient had a series of non-sustained V-tach starting around 1451. Patient had 11 beats of V-tach, then 6 beats, 4 beats, then 16 more beats of V-tach. This writer came into patient's room and found patient lying quietly in bed watching TV; denied any discomfort. Hospitalist and Cardiologist notified.    Amiodarone 200 mg, PO, once, was ordered and administered.  
Comprehensive Nutrition Assepssment    Type and Reason for Visit:  Reassess    Nutrition Recommendations/Plan:   Continue no added salt diet  If po intake drops below 50%, recommend Ensure clear bid (prefers over standard Ensure)     Malnutrition Assessment:  Malnutrition Status:  No malnutrition (03/28/24 1122)      Nutrition Assessment:    Follow up:  On no added salt diet.  Po intake % on 3/27.  Weight stable.  No new recommendations at this time.    Nutrition Related Findings:    labs reviewed, bhavna WNL on 3/28 Wound Type: None       Current Nutrition Intake & Therapies:    Average Meal Intake: 51-75%, %  Average Supplements Intake: None Ordered  ADULT DIET; Regular; No Added Salt (3-4 gm); No Caffeine    Anthropometric Measures:  Height: 170.2 cm (5' 7\")  Ideal Body Weight (IBW): 135 lbs (61 kg)       Current Body Weight: 63.2 kg (139 lb 5.3 oz),   IBW.    Current BMI (kg/m2): 21.8                               Estimated Daily Nutrient Needs:  Energy Requirements Based On: Kcal/kg  Weight Used for Energy Requirements: Current  Energy (kcal/day): 1580 - 1896 (25 - 30 kcal/kg)  Weight Used for Protein Requirements: Current  Protein (g/day): 63 - 76 (1-1.2 g/kg)  Method Used for Fluid Requirements: 1 ml/kcal  Fluid (ml/day): or per provider    Nutrition Diagnosis:   No nutrition diagnosis at this time related to   as evidenced by      Nutrition Interventions:   Food and/or Nutrient Delivery: Continue Current Diet  Nutrition Education/Counseling: Education not indicated  Coordination of Nutrition Care: Continue to monitor while inpatient       Goals:     Goals: Meet at least 75% of estimated needs, by next RD assessment       Nutrition Monitoring and Evaluation:   Behavioral-Environmental Outcomes: None Identified  Food/Nutrient Intake Outcomes: Food and Nutrient Intake  Physical Signs/Symptoms Outcomes: Biochemical Data, Weight, Nutrition Focused Physical Findings    Discharge Planning:    No 
Comprehensive Nutrition Assessment    Type and Reason for Visit:  Initial, Positive Nutrition Screen    Nutrition Recommendations/Plan:   Continue regular HINA diet, no caffeine per provider, monitor intakes     Malnutrition Assessment:  Malnutrition Status:  Insufficient data (03/23/24 1155)    Context:  Social/Environmental Circumstances       Nutrition Assessment:    MST 3. Pt presenting w/ ventricular tachycardia. Pt w/ no recent hx of significant wt loss. Currently in droplet plus isolation. No intakes recorded in chart. Nutrition related labs reviewed, current diet order appropriate for pt. Will follow up to reassess intakes.    Nutrition Related Findings:    149 glucose, 3.4 K Wound Type: None       Current Nutrition Intake & Therapies:    Average Meal Intake: Unable to assess  Average Supplements Intake: None Ordered  ADULT DIET; Regular; No Added Salt (3-4 gm); No Caffeine    Anthropometric Measures:  Height: 170.2 cm (5' 7\")  Ideal Body Weight (IBW): 135 lbs (61 kg)       Current Body Weight: 63.2 kg (139 lb 5.3 oz),   IBW.    Current BMI (kg/m2): 21.8                               Estimated Daily Nutrient Needs:  Energy Requirements Based On: Kcal/kg  Weight Used for Energy Requirements: Current  Energy (kcal/day): 1580 - 1896 (25 - 30 kcal/kg)  Weight Used for Protein Requirements: Current  Protein (g/day): 63 - 76 (1-1.2 g/kg)  Method Used for Fluid Requirements: 1 ml/kcal  Fluid (ml/day): or per provider    Nutrition Diagnosis:   No nutrition diagnosis at this time     Nutrition Interventions:   Food and/or Nutrient Delivery: Continue Current Diet  Nutrition Education/Counseling: Education not indicated  Coordination of Nutrition Care: Continue to monitor while inpatient       Goals:     Goals: Meet at least 75% of estimated needs, by next RD assessment       Nutrition Monitoring and Evaluation:   Behavioral-Environmental Outcomes: None Identified  Food/Nutrient Intake Outcomes: Food and Nutrient 
Late entry due to patient care    Patient's mother called RN  at 1058 for update on patient. Update provided. Mother voiced concerns over patient's drug and alcohol abuse and her decreased mentation at home. This RN explained the effects of long term alcohol and drug use on mentation and how it could be as a result of that. Mother verbalized understanding. Mother also asked if RN could get patient to sign HPOA paperwork while in the hospital to be able to make decisions for patient and get her to sign a DNR. Thorough education provided to mother in regards to patient's ability and legal right to make her own decisions and what a DNR meant. After lengthy conversation with in depth education, family member verbalized understanding and thanked RN. RN later to bedside to talk with patient about phone call with mother and patient became very agitated and crying. RN spent a lengthy amount of time consoling patient and trying to calm her down, patient eventually calmed down. Patient verbalized she does not want anyone as her POA and that she does not trust her mother or son to make her decisions. Patient unsure if she wants them blocked from her chart so they would not be able to obtain information. Patient then stated \"I can't block my mom because then she won't buy me things\" and proceeded to cry.  Patient calmed down and was scored via CIWA scoring. PRN ativan given.     Mother called RN at 1342 asking if RN did paperwork with patient or if RN mentioned it. RN denied and stated patient does not want to do it at this time. Mother asked to speak to patient and RN declined at this time per patient wishes. Family member verbalized understanding and ended call.     Patient has been scored twice now and receiving appropriate dosing of ativan per CIWA scoring. Patient continuously pulling off oxygen and cannot tolerate room air, frequent attempts to keep patient from removing O2 from nose.     Electronically signed by Veronika 
Medication Reconciliation    List of medications patient is currently taking is complete.     Source of information: 1. Conversation with patient/RN                                      2. EPIC records                                      3. Recent medication reconciliation per [facility] on multiple occasions    Vladislav Bullock RPH, PharmD, BCPS  3/23/2024 9:20 AM              
Occupational Therapy  Facility/Department: [address] PROGRESSIVE CARE  Occupational Therapy Daily Treatment/Discharge Note    Name: Lali Cazares  : 1975  MRN: [id number]  Date of Service: 3/29/2024    Discharge Recommendations:  Home with assist PRN     Lali Cazares scored a 21/24 on the -[facility] ADL Inpatient form.  At this time, no further OT is recommended upon discharge due to patient functioning close to baseline at this time.  Recommend patient returns to prior setting with prior services.        Patient Diagnosis(es): The primary encounter diagnosis was COVID. Diagnoses of Hypokalemia, V-tach (HCC), and Pneumonia due to infectious organism, unspecified laterality, unspecified part of lung were also pertinent to this visit.  Past Medical History:  has a past medical history of Asthma, Depression, Drug dependence (HCC), Drug overdose, multiple drugs, Eczema of hand, ETOH abuse, Hepatitis C, Heroin withdrawal (HCC), MRSA infection, and Suicidal ideation.  Past Surgical History:  has a past surgical history that includes  section; Breast surgery; Abscess Drainage (Left, 2017); and Upper gastrointestinal endoscopy (N/A, 2024).    Treatment Diagnosis: impaired cognition/safety awareness, balance, strength, endurance, ADLs, mobility and txs    Assessment   Performance deficits / Impairments: Decreased functional mobility ;Decreased endurance;Decreased ADL status;Decreased balance;Decreased high-level IADLs;Decreased safe awareness;Decreased cognition  Assessment: Pt agreeable to therapy this date. Pt completes bed mobility with SBA, SBA fxl tx's and SBA/CGA fxl mobility household distances without device. Independent LB dressing to don bilateral footie socks seated EOB. Declines further ADLs this date. pt is functioning close to baseline at this time and anticipate  anticipate pt will be safe to D/C home with family assist PRN at this time.  Prognosis: Fair  History: 48 y.o. F 
Occupational Therapy  Facility/Department: [facility] 5W PROGRESSIVE CARE  Occupational Therapy Daily Note  This note to serve as discharge summary if pt d/c'd prior to next session    Name: Lali Cazares  : 1975  MRN: [id number]  Date of Service: 3/26/2024    Discharge Recommendations:  Continue to assess pending progress          Patient Diagnosis(es): The primary encounter diagnosis was COVID. Diagnoses of Hypokalemia, V-tach (HCC), and Pneumonia due to infectious organism, unspecified laterality, unspecified part of lung were also pertinent to this visit.  Past Medical History:  has a past medical history of Asthma, Depression, Drug dependence (HCC), Drug overdose, multiple drugs, Eczema of hand, ETOH abuse, Hepatitis C, Heroin withdrawal (HCC), MRSA infection, and Suicidal ideation.  Past Surgical History:  has a past surgical history that includes  section; Breast surgery; Abscess Drainage (Left, 2017); and Upper gastrointestinal endoscopy (N/A, 2024).    Treatment Diagnosis: impaired cognition/safety awareness, balance, strength, endurance, ADLs, mobility and txs      Assessment   Performance deficits / Impairments: Decreased functional mobility ;Decreased endurance;Decreased ADL status;Decreased balance;Decreased high-level IADLs;Decreased safe awareness;Decreased cognition  Assessment: Discussed with OTR:Pt with poor tolerance to therapy today. Pt SBA for bed mob, and sat SBA at EOB to eat food, yet c/o \"sore\" mouth. Pt sit><stand 1x from EOB, CGA and up less than 15 sec, CGA. Pt declined amb today. Anticipate pt needing overall Min/CGA for ADL's. SP02 WNL on 3L.  Pt currently unsteady, fall risk, on CIWA protocol. Will cont to assess D/C recommendations pending pt status; anticipate adequate progress for D/C home with family assist PRN.  Treatment Diagnosis: impaired cognition/safety awareness, balance, strength, endurance, ADLs, mobility and txs  Prognosis: Fair  History: 48 
Patient appears to be doing well this morning, appears more alert than yesterday morning but hard to assess d/t patient giving only one word answers. VSS on 2L of O2 ND. Patient took all morning medications without complications. Will continue with POC.     Electronically signed by Tavon Dash RN on 3/27/2024 at 9:36 AM    
Patient awake this AM, A&O X3-4. Patient however did not answer any orientation questions correctly for MD. Patient very fidgety and slightly irritable. CIWA Score 9. 1 mg PO Ativan given per PRN orders. Magnesium replaced via PICC line. PICC line dressing changed. VSS.     Patient is currently resting comfortably. Rise and fall of chest noted. Patient drowsy but arouses to touch and voice. CIWA order to be discontinued this evening per MD. Bed in lowest position, alarm on, call button within reach. Patient calls appropriately. Care ongoing.   
Patient awaken to voice and can follow commands - too lethargic to assess CIWA at this time.     Electronically signed by Tavon Dash RN on 3/26/2024 at 10:17 AM    
Patient doing okay this morning, was lethargic but able to answer all questions this morning and take all PO medications without complications. Patient states all needs are meet, POC was discussed and patient states all questions were answered. All needs meet at this time, will continue with POC.     Electronically signed by Tavon Dash RN on 3/26/2024 at 8:57 AM    
Patient had 19 BTS VTAC. Patients vital signs checked and stable. Patient is asymptomatic and sinus rhythm on the bedside cardiac monitor. RN spoke with MD. Chambers, he ordered labs; magnesium and troponin; to be checked in the morning, and to have stat EKG done, if it occurs again.   
Patient not voided, bladder scan showed 430mL in bladder, patient not safe to get up ay this time nor to follow commands to urinate self. New orders for one time straight cath.     Electronically signed by Veronika Servin RN on 3/24/2024 at 6:36 PM    
Physical Therapy      Lali Cazares  [id number]  [id number]      Attempted to see for PT session however pt declined. States \"I just want to sleep\". Attempted to encourage pt however she continued to decline  Electronically signed by CARLITO GILMORE, PT on 3/27/2024 at 2:37 PM       
Physical Therapy  Facility/Department: [address] PROGRESSIVE CARE  Physical Therapy Treatment Note    Name: Lali Cazares  : 1975  MRN: [id number]  Date of Service: 3/29/2024    Discharge Recommendations:  24 hour supervision or assist, Home with assist PRN   PT Equipment Recommendations  Equipment Needed: No      Lali Cazares scored a 20/24 on the AM-PAC short mobility form. Current research shows that an AM-PAC score of 18 or greater is typically associated with a discharge to the patient's home setting.At this time pt will be safe to return home with family assist. Please see assessment section for further patient specific details.    If patient discharges prior to next session this note will serve as a discharge summary.  Please see below for the latest assessment towards goals.       Patient Diagnosis(es): The primary encounter diagnosis was COVID. Diagnoses of Hypokalemia, V-tach (HCC), and Pneumonia due to infectious organism, unspecified laterality, unspecified part of lung were also pertinent to this visit.  Past Medical History:  has a past medical history of Asthma, Depression, Drug dependence (HCC), Drug overdose, multiple drugs, Eczema of hand, ETOH abuse, Hepatitis C, Heroin withdrawal (HCC), MRSA infection, and Suicidal ideation.  Past Surgical History:  has a past surgical history that includes  section; Breast surgery; Abscess Drainage (Left, 2017); and Upper gastrointestinal endoscopy (N/A, 2024).    Assessment   Body Structures, Functions, Activity Limitations Requiring Skilled Therapeutic Intervention: Decreased functional mobility ;Decreased strength;Decreased safe awareness;Decreased cognition;Decreased endurance  Assessment: Ms. Lali Cazares is a 48 y.o. lady with past medical history stated below significant for alcohol abuse, polysubstance abuse, mood disorder, hepatitis C, history of prior V. tach on amiodarone and prolonged QT Presented to the 
Pt having runs of Vtach. Called [facility] and left message for call back; spoke to Nahum. Dr Longo called right back. Informed of pt having approx 19 beats of VT then reg beat then another short run of VT and that this pattern happened again.     Pt asymptomatic and VSS: BP (!) 150/82   Pulse 89   Temp 99.5 °F (37.5 °C) (Oral)   Resp 18   Ht 1.702 m (5' 7.01\")   Wt 64.1 kg (141 lb 5 oz)   SpO2 96%   BMI 22.13 kg/m²     Dr Longo will see pt today. Will call him back if pt becomes unstable and/or symptomatic or VT becomes more frequent or constant.     Electronically signed by Dania Roldan RN on 3/30/2024 at 9:11 AM      Notified John Negrete MD of the above as well.    Electronically signed by Dania Roldan RN on 3/30/2024 at 9:46 AM    
Pt relays she hasn't had a bowel movement since the day before yesterday or the day before that. Last bowel movement charted was on 3/27/24. Pt given PRN glycolax.    Electronically signed by Dania Roldan RN on 3/30/2024 at 10:41 AM      Pt w/success bowel movement after above intervention.     03/30/24 1430   Stool Assessment   Incontinence No   Stool Appearance Soft   Stool Color Brown   Stool Amount Large   Stool Source Rectum   Last BM (including prior to admit) 03/30/24   Unmeasured Output   Urine Occurrence 1   Stool Occurrence 1     Electronically signed by Dania Roldan RN on 3/30/2024 at 4:20 PM    
Secure message sent to Tessy Jeronimo NP d/t patient having chest pain rated 8/10, unresolved from ED.     Orders for Morphine placed, see MAR for more details.   
Secure message sent to Tessy Jeronimo NP d/t pt having orders for multiple continuous fluids.     To continue with NS w/k+. Not going to start LR. See MAR for more details.   
Secure message to MD regarding patient's hx of alcohol abuse and needing CIWA protocol as patient experiencing increased anxiety and withdrawal symptoms, new orders placed.     Electronically signed by Veronika Servin RN on 3/24/2024 at 6:35 PM    
Shift Summary: 3/22: To ED w/ Chest pain for one day. Pt had run of v-tach lasting 30+ beats, she has a hx of v-tach and takes amio at home. Labs: K+ 2.1 and Mag; 1.2. Tx to ICU for Amio gtt and critical lab values.     K+ 3.4 this morning - replaced w/ 40 meq orally    Mag 1.6 this morning- Needs to be replaced    No episodes of v-tach since arriving to ICU    Family updated: no, pt attempted to reach     Most recent vitals: /83   Pulse 56   Temp 98.9 °F (37.2 °C) (Oral)   Resp 21   Ht 1.702 m (5' 7\")   Wt 63.2 kg (139 lb 5.3 oz)   SpO2 100%   BMI 21.82 kg/m²      Rhythm: Sinus Gunnar      NC/HFNC- 4 lpm  Respiratory support: - No ventilator support    Vent days: Day n/a    Admission weight Weight - Scale: (!) 144 kg (317 lb 7.4 oz)  Today's weight   Wt Readings from Last 1 Encounters:   03/23/24 63.2 kg (139 lb 5.3 oz)         UOP >30ml/hr: yes          Restraints: no  Order current and documentation up to date?    Lines/Drains reviewed @ bedside.  Peripheral IV 03/22/24 Left Antecubital (Active)   Number of days: 0       Peripheral IV 03/23/24 Right Antecubital (Active)   Number of days: 0       Peripheral IV 03/23/24 Posterior;Right Hand (Active)   Number of days: 0     Chaves need assessed each shift: no      Drip rates at handoff:    sodium chloride      amiodarone 0.5 mg/min (03/23/24 0540)    0.9% NaCl with KCl 40 mEq 100 mL/hr at 03/23/24 0513       Lab Data:   CBC:   Recent Labs     03/22/24 2235 03/23/24 0423   WBC 8.2 8.3   HGB 9.8* 10.5*   HCT 29.7* 31.4*   MCV 85.0 83.9   PLT 58* 52*     BMP:    Recent Labs     03/22/24 2235 03/23/24  0423   * 136   K 2.1* 3.4*   CO2 34* 31   BUN 4* 4*   CREATININE <0.5* <0.5*     LIVR:   Recent Labs     03/22/24 2235   AST 43*   ALT 19     PT/INR:   Recent Labs     03/22/24 2235   PROT 5.2*   INR 1.34*     APTT: No results for input(s): \"APTT\" in the last 72 hours.  ABG: No results for input(s): \"PHART\", \"PKB6CCQ\", \"PO2ART\" in the last 72 
This RN called down to lab asking them to complete STAT BMP, Mag, and Phos orders. Orders placed at 0315.   
This RN walked into patient room and patient O2 was not applied, patient was on RA. O2 stating in the 90's - left on RA at this time. Patient tolerating well.     Electronically signed by Tavon Dash RN on 3/27/2024 at 2:39 PM    
22.24 kg/m²     Last Body weight:   Wt Readings from Last 3 Encounters:   03/26/24 64.4 kg (141 lb 15.6 oz)   02/26/24 60 kg (132 lb 4.4 oz)   02/15/24 60 kg (132 lb 4.4 oz)       Body Mass Index : Body mass index is 22.24 kg/m².      Intake and Output summary:   Intake/Output Summary (Last 24 hours) at 3/26/2024 1146  Last data filed at 3/25/2024 1320  Gross per 24 hour   Intake 240 ml   Output --   Net 240 ml         Physical Examination:     PHYSICAL EXAM:    Gen: Moderate acute distress  Eyes: PERRL. Anicteric sclera. No conjunctival injection.   ENT: No discharge. Posterior oropharynx clear. External appearance of ears and nose normal.  Neck: Trachea midline. No mass, no lymphadenopathy    Resp: Diminished breath sounds bilaterally, increased work of breathing  CV: Regular rate. Regular rhythm. No murmur or rub. No edema.   GI: Soft, Non-tender. Non-distended. +BS  Skin: Warm, dry, w/o erythema.   Lymph: No cervical or supraclavicular LAD.   M/S: No cyanosis. No clubbing.    Neuro:  CN 2-12 tested, no focal neurologic deficit.  Moves all extremities  Psych: Awake and alert, Oriented x 3.  Judgement and insight appropriate.  Mood stable.        Laboratory findings:-    CBC:   Recent Labs     03/26/24  0544   WBC 5.5   HGB 9.0*   PLT 56*       BMP:    Recent Labs     03/23/24  1400 03/23/24  1400 03/24/24  0545 03/25/24  0635     --  142 143   K 3.2*   < > 3.7 3.9   CL 98*  --  101 103   CO2 32  --  34* 35*   BUN 7  --  12 14   CREATININE 0.5*  --  0.6 0.6   GLUCOSE 162*  --  143* 134*    < > = values in this interval not displayed.       S. Calcium:  Recent Labs     03/25/24  0635   CALCIUM 8.5       S. Magnesium:  Recent Labs     03/25/24  0635   MG 1.70*       S. Phosphorus:  Recent Labs     03/25/24  0635   PHOS 3.3         INR:   No results for input(s): \"INR\" in the last 72 hours.    Hepatic functions:   No results for input(s): \"ALKPHOS\", \"ALT\", \"AST\", \"PROT\", \"BILITOT\", \"BILIDIR\", \"LABALBU\" in the 
C, history of prior V. tach on amiodarone and prolonged QT Presented to the emergency department with multiple complaints including fatigue chest tightness Chest x-ray with diffuse bilateral hazy airspace disease \" Current hospital issues: Acute respiratory failure with hypoxia, Covid ARDS, Ventricular tachycardia, Hypomagnesemia and hypokalemia and Polysubstance abuse. Pt at baseline is Ind without an AD; she reports she lives with her son but has given conflicting information to other staff.  Pt currently is unsteady and a fall risk.  She is on CIWA protocol.  Hopeful pt will continue to progess and be able to return home at discharge  Therapy Prognosis: Fair  Decision Making: Medium Complexity  Barriers to Learning: cognition, CIWA  Requires PT Follow-Up: Yes  Activity Tolerance  Activity Tolerance: Patient tolerated evaluation without incident;Treatment limited secondary to decreased cognition     Plan   Physical Therapy Plan  General Plan: 3-5 times per week  Current Treatment Recommendations: Functional mobility training, Transfer training, Balance training, Gait training, Strengthening, Endurance training, Safety education & training  Safety Devices  Type of Devices: Call light within reach, Left in bed, Bed alarm in place, Nurse notified, All fall risk precautions in place, Patient at risk for falls     Restrictions  Restrictions/Precautions  Restrictions/Precautions: Fall Risk, Contact Precautions (droplet +)  Position Activity Restriction  Other position/activity restrictions: CIWA, COVID +     Subjective   General  Chart Reviewed: Yes  Additional Pertinent Hx: per Pulmonology note: \"Ms. Lali Cazares is a 48 y.o. lady with past medical history stated below significant for alcohol abuse, polysubstance abuse, mood disorder, hepatitis C, history of prior V. tach on amiodarone and prolonged QT  Presented to the emergency department with multiple complaints including fatigue chest tightness  Chest x-ray 
presents with chest pain and found to be in V. tach with a prolonged QTc.  Patient also reporting fatigue for about a week a dry cough and dyspnea on exertion but no orthopnea, PND or increased peripheral edema.  She denies any fevers, chills, nausea vomiting, diarrhea, headaches.     In the emergency room her temperature was 37.2, blood pressure 157/80, pulse 60, respirations 16, sat 90% on room air.    Review of Systems:    Review of Systems    10 point ROS negative except as stated above in \"subjective\" section    Objective:     Intake/Output Summary (Last 24 hours) at 3/25/2024 0916  Last data filed at 3/25/2024 0445  Gross per 24 hour   Intake --   Output 730 ml   Net -730 ml          Vitals:   Vitals:    03/25/24 0839   BP:    Pulse: 75   Resp: 18   Temp:    SpO2: 98%       Physical Exam:     General: Chronically ill-appearing  Eyes: EOMI  ENT: neck supple  Cardiovascular: Regular rate. Pain with palpation of chest wall  Respiratory: Clear to auscultation, on 4 L NC.  Weaned to 3 L NC satting 94%  Gastrointestinal: Soft, non tender; vague, generalized tenderness  Musculoskeletal: No edema  Skin: warm, dry  Neuro: Alert.  Psych: Mood appropriate.     Medications:   Medications:    sodium chloride flush  5-40 mL IntraVENous 2 times per day    thiamine  100 mg Oral Daily    dexAMETHasone  6 mg Oral BID    sodium chloride flush  5-40 mL IntraVENous 2 times per day    enoxaparin  30 mg SubCUTAneous BID    aspirin  81 mg Oral Daily    buprenorphine-naloxone  1 tablet SubLINGual Daily    escitalopram  20 mg Oral Daily    gabapentin  300 mg Oral TID    levothyroxine  50 mcg Oral QAM AC    mometasone-formoterol  2 puff Inhalation BID RT    pantoprazole  40 mg Oral QAM AC    valACYclovir  500 mg Oral Daily    [START ON 3/29/2024] vitamin D  50,000 Units Oral Weekly    albuterol-ipratropium  1 puff Inhalation Q4H RT    sodium chloride flush  5-40 mL IntraVENous 2 times per day    amiodarone  200 mg Oral Daily    
Pertinent images / reports were reviewed as a part of this visit.    CXR PA/LAT: Results for orders placed during the hospital encounter of 01/24/24    XR CHEST (2 VW)    Narrative  EXAMINATION:  TWO XRAY VIEWS OF THE CHEST    1/24/2024 6:50 pm    COMPARISON:  05/02/2019    HISTORY:  ORDERING SYSTEM PROVIDED HISTORY: sob  TECHNOLOGIST PROVIDED HISTORY:  Reason for exam:->sob  Reason for Exam: Nausea    FINDINGS:  Cardiac silhouette is within normal limits. Pulmonary vasculature is within  normal limits. The lungs are clear. No pneumothorax is identified. Bony  structures are unremarkable.    Impression  No acute cardiopulmonary process.      CXR portable: Results for orders placed during the hospital encounter of 03/22/24    XR CHEST PORTABLE (Preliminary)  This result has not been signed. Information might be incomplete.    Narrative  EXAMINATION:  ONE XRAY VIEW OF THE CHEST    3/22/2024 10:38 pm    COMPARISON:  February 8, 2024    HISTORY:  ORDERING SYSTEM PROVIDED HISTORY: SOB  TECHNOLOGIST PROVIDED HISTORY:  Reason for exam:->SOB  Reason for Exam: SOB    FINDINGS:  No appreciable pleural effusion.  Mild enlargement of cardiac silhouette.  Diffuse hazy opacity of the lungs bilaterally.  No visible pneumothorax.    Impression  1. Diffuse hazy bilateral airspace opacities.  Differential includes multi  lobar pneumonia and ARDS.          Gerardo Serna MD, MMendozaD.            3/28/2024, 10:23 AM          
Easily anxious  Orientation  Overall Orientation Status: Impaired  Orientation Level: Oriented to person;Oriented to place;Oriented to situation;Disoriented to time                  Education Given To: Patient  Education Provided: Role of Therapy;Plan of Care;Transfer Training;Fall Prevention Strategies  Education Method: Verbal  Barriers to Learning: Cognition  Education Outcome: Verbalized understanding;Continued education needed       AM-PAC - ADL  AM-PAC Daily Activity - Inpatient   How much help is needed for putting on and taking off regular lower body clothing?: A Little  How much help is needed for bathing (which includes washing, rinsing, drying)?: A Lot  How much help is needed for toileting (which includes using toilet, bedpan, or urinal)?: A Little  How much help is needed for putting on and taking off regular upper body clothing?: A Little  How much help is needed for taking care of personal grooming?: A Little  How much help for eating meals?: None  AM-PAC Inpatient Daily Activity Raw Score: 18  AM-PAC Inpatient ADL T-Scale Score : 38.66  ADL Inpatient CMS 0-100% Score: 46.65  ADL Inpatient CMS G-Code Modifier : CK    Goals  Short Term Goals  Time Frame for Short Term Goals: prior to D/C. Status: goals ongoing  Short Term Goal 1: Pt will complete bed mobility mod I  Short Term Goal 2: Pt will complete functional mobility/txs using LRAD mod I  Short Term Goal 3: Pt will complete toileting mod I  Short Term Goal 4: Pt will complete dressing mod I  Short Term Goal 5: Pt will complete grooming in stance at sink mod I  Long Term Goals  Time Frame for Long Term Goals : STG = LTG  Patient Goals   Patient goals : To go home       Therapy Time   Individual Concurrent Group Co-treatment   Time In 1005         Time Out 1035         Minutes 30         Timed Code Treatment Minutes: 30 Minutes       Efren Erickson OTR/L [id number]     
Patient tolerated evaluation without incident;Treatment limited secondary to decreased cognition    Bed mobility  Supine to Sit: Contact guard assistance (HOB elevated)  Sit to Supine: Contact guard assistance    Transfers  Sit to stand: Contact guard assistance;Minimal assistance  Stand to sit: Contact guard assistance;Minimal assistance  Transfer Comments: Pt tx <> EOB and <> commode CGA/min A    Vision  Vision: Within Functional Limits  Hearing  Hearing: Within functional limits  Cognition  Overall Cognitive Status: Exceptions  Arousal/Alertness: Delayed responses to stimuli  Following Commands: Follows one step commands with repetition  Attention Span: Attends with cues to redirect  Safety Judgement: Decreased awareness of need for safety;Decreased awareness of need for assistance  Insights: Decreased awareness of deficits  Orientation  Overall Orientation Status: Within Functional Limits  Orientation Level: Oriented to place;Oriented to time;Oriented to person (increased time/effort)                 Static Sitting Balance: seated on commode SBA for safety  Dynamic Sitting Balance: seated on commode CGA/SBA for laya care  Static Standing Balance: in stance CGA for safety  Dynamic Standing Balance: in stance CGA/min A throughout functional mobility    Education Given To: Patient  Education Provided: Role of Therapy;Plan of Care;Transfer Training;Fall Prevention Strategies;ADL Adaptive Strategies  Education Method: Verbal  Barriers to Learning: Cognition  Education Outcome: Verbalized understanding;Continued education needed             AM-PAC - ADL  AM-PAC Daily Activity - Inpatient   How much help is needed for putting on and taking off regular lower body clothing?: A Little  How much help is needed for bathing (which includes washing, rinsing, drying)?: A Little  How much help is needed for toileting (which includes using toilet, bedpan, or urinal)?: A Little  How much help is needed for putting on and taking 
0.60 ug/mL [facility]   EKG 12 Lead    Collection Time: 03/22/24 11:02 PM   Result Value Ref Range    Ventricular Rate 51 BPM    Atrial Rate 51 BPM    P-R Interval 118 ms    QRS Duration 80 ms    Q-T Interval 546 ms    QTc Calculation (Bazett) 503 ms    P Axis 43 degrees    R Axis 63 degrees    T Axis 84 degrees    Diagnosis       Sinus bradycardiaNonspecific ST abnormalityProlonged QTAbnormal ECGWhen compared with ECG of 22-MAR-2024 22:24, (unconfirmed)Previous ECG has undetermined rhythm, needs reviewQT has shortened   Basic Metabolic Panel    Collection Time: 03/23/24  4:23 AM   Result Value Ref Range    Sodium 136 136 - 145 mmol/L    Potassium 3.4 (L) 3.5 - 5.1 mmol/L    Chloride 94 (L) 99 - 110 mmol/L    CO2 31 21 - 32 mmol/L    Anion Gap 11 3 - 16    Glucose 149 (H) 70 - 99 mg/dL    BUN 4 (L) 7 - 20 mg/dL    Creatinine <0.5 (L) 0.6 - 1.1 mg/dL    Est, Glom Filt Rate >60 >60    Calcium 7.6 (L) 8.3 - 10.6 mg/dL   Magnesium    Collection Time: 03/23/24  4:23 AM   Result Value Ref Range    Magnesium 1.60 (L) 1.80 - 2.40 mg/dL   Phosphorus    Collection Time: 03/23/24  4:23 AM   Result Value Ref Range    Phosphorus 3.1 2.5 - 4.9 mg/dL   CBC    Collection Time: 03/23/24  4:23 AM   Result Value Ref Range    WBC 8.3 4.0 - 11.0 K/uL    RBC 3.74 (L) 4.00 - 5.20 M/uL    Hemoglobin 10.5 (L) 12.0 - 16.0 g/dL    Hematocrit 31.4 (L) 36.0 - 48.0 %    MCV 83.9 80.0 - 100.0 fL    MCH 27.9 26.0 - 34.0 pg    MCHC 33.3 31.0 - 36.0 g/dL    RDW 24.3 (H) 12.4 - 15.4 %    Platelets 52 (L) 135 - 450 K/uL    MPV 7.7 5.0 - 10.5 fL        Imaging/Diagnostics Last 24 Hours   XR CHEST PORTABLE    Result Date: 3/22/2024  EXAMINATION: ONE XRAY VIEW OF THE CHEST 3/22/2024 10:38 pm COMPARISON: February 8, 2024 HISTORY: ORDERING SYSTEM PROVIDED HISTORY: SOB TECHNOLOGIST PROVIDED HISTORY: Reason for exam:->SOB Reason for Exam: SOB FINDINGS: No appreciable pleural effusion.  Mild enlargement of cardiac silhouette. Diffuse hazy opacity of the lungs 
infection    Liver Cirrhosis  Thrombocytopenia  -due to liver cirrhosis  -Monitor for signs of bleeding        - platelets 56--> 63--> 67 today   Covid Infection  -Decadron 6mg daily for 10-days  Hx of polysubstance abuse  Alcohol withdrawal   -CIWA protocol , scoring since 3/24, stopped 3/29  -UDS positive for cocaine          12.back pain.  Schedule  flexeril  PO 10 mg TID.   Add lidocaine patch.     13.acute on chronic anemia.   In the setting of heavy alcohol use.   Hb 8.3 , last ferritin 18.7 , with low iron and low sat.   Will repeat iron studies         Diet ADULT DIET; Regular; No Added Salt (3-4 gm); No Caffeine     DVT Prophylaxis [x] Lovenox, []  Heparin, [] SCDs, [] Ambulation,  [] Eliquis, [] Xarelto  [] Coumadin   Code Status Full Code   Disposition From: home   Expected Disposition: home   Estimated Date of Discharge:tomorrow pending arrangement of home situation   Patient requires continued admission due to alcohol withdrwal    Surrogate Decision Maker/ POA  Mother      Personally reviewed Lab Studies and Imaging        Medical Decision Making:  The following items were considered in medical decision making:  Discussion of patient care with other providers  Reviewed clinical lab tests  Reviewed radiology tests  Reviewed other diagnostic tests/interventions  Independent review of radiologic images  Microbiology cultures and other micro tests reviewed        Electronically signed by John Negrete MD on 3/31/2024 at 9:27 AM  Comment: Please note this report has been produced using speech recognition software and may contain errors related to that system including errors in grammar, punctuation, and spelling, as well as words and phrases that may be inappropriate. If there are any questions or concerns, please feel free to contact the dictating provider for clarification.     
protocol , scoring since 3/24  -UDS positive for cocaine        Diet ADULT DIET; Regular; No Added Salt (3-4 gm); No Caffeine     DVT Prophylaxis [x] Lovenox, []  Heparin, [] SCDs, [] Ambulation,  [] Eliquis, [] Xarelto  [] Coumadin   Code Status Full Code   Disposition From: home   Expected Disposition: home   Estimated Date of Discharge: 3-5 days   Patient requires continued admission due to alcohol withdrwal    Surrogate Decision Maker/ POA  Mother      Personally reviewed Lab Studies and Imaging        Medical Decision Making:  The following items were considered in medical decision making:  Discussion of patient care with other providers  Reviewed clinical lab tests  Reviewed radiology tests  Reviewed other diagnostic tests/interventions  Independent review of radiologic images  Microbiology cultures and other micro tests reviewed        Electronically signed by John Negrete MD on 3/26/2024 at 10:00 AM  Comment: Please note this report has been produced using speech recognition software and may contain errors related to that system including errors in grammar, punctuation, and spelling, as well as words and phrases that may be inappropriate. If there are any questions or concerns, please feel free to contact the dictating provider for clarification.     
Filt Rate >60 >60    Calcium 7.6 (L) 8.3 - 10.6 mg/dL   Magnesium    Collection Time: 03/23/24  4:23 AM   Result Value Ref Range    Magnesium 1.60 (L) 1.80 - 2.40 mg/dL   Phosphorus    Collection Time: 03/23/24  4:23 AM   Result Value Ref Range    Phosphorus 3.1 2.5 - 4.9 mg/dL   CBC    Collection Time: 03/23/24  4:23 AM   Result Value Ref Range    WBC 8.3 4.0 - 11.0 K/uL    RBC 3.74 (L) 4.00 - 5.20 M/uL    Hemoglobin 10.5 (L) 12.0 - 16.0 g/dL    Hematocrit 31.4 (L) 36.0 - 48.0 %    MCV 83.9 80.0 - 100.0 fL    MCH 27.9 26.0 - 34.0 pg    MCHC 33.3 31.0 - 36.0 g/dL    RDW 24.3 (H) 12.4 - 15.4 %    Platelets 52 (L) 135 - 450 K/uL    MPV 7.7 5.0 - 10.5 fL        Imaging/Diagnostics Last 24 Hours   XR CHEST PORTABLE    Result Date: 3/22/2024  EXAMINATION: ONE XRAY VIEW OF THE CHEST 3/22/2024 10:38 pm COMPARISON: February 8, 2024 HISTORY: ORDERING SYSTEM PROVIDED HISTORY: SOB TECHNOLOGIST PROVIDED HISTORY: Reason for exam:->SOB Reason for Exam: SOB FINDINGS: No appreciable pleural effusion.  Mild enlargement of cardiac silhouette. Diffuse hazy opacity of the lungs bilaterally.  No visible pneumothorax.     1. Diffuse hazy bilateral airspace opacities.  Differential includes multi lobar pneumonia and ARDS.       Electronically signed by Umberto Pradhan MD on 3/24/2024 at 8:48 AM

## 2024-04-01 VITALS
TEMPERATURE: 98.8 F | HEIGHT: 67 IN | HEART RATE: 80 BPM | WEIGHT: 142.42 LBS | RESPIRATION RATE: 18 BRPM | OXYGEN SATURATION: 95 % | DIASTOLIC BLOOD PRESSURE: 63 MMHG | SYSTOLIC BLOOD PRESSURE: 103 MMHG | BODY MASS INDEX: 22.35 KG/M2

## 2024-04-01 LAB
ALBUMIN SERPL-MCNC: 3 G/DL (ref 3.4–5)
ALBUMIN/GLOB SERPL: 1.4 {RATIO} (ref 1.1–2.2)
ALP SERPL-CCNC: 123 U/L (ref 40–129)
ALT SERPL-CCNC: 48 U/L (ref 10–40)
ANION GAP SERPL CALCULATED.3IONS-SCNC: 6 MMOL/L (ref 3–16)
AST SERPL-CCNC: 21 U/L (ref 15–37)
BASOPHILS # BLD: 0 K/UL (ref 0–0.2)
BASOPHILS NFR BLD: 0.1 %
BILIRUB SERPL-MCNC: 0.4 MG/DL (ref 0–1)
BUN SERPL-MCNC: 10 MG/DL (ref 7–20)
CALCIUM SERPL-MCNC: 8.3 MG/DL (ref 8.3–10.6)
CHLORIDE SERPL-SCNC: 104 MMOL/L (ref 99–110)
CO2 SERPL-SCNC: 27 MMOL/L (ref 21–32)
CREAT SERPL-MCNC: 0.6 MG/DL (ref 0.6–1.1)
DEPRECATED RDW RBC AUTO: 22 % (ref 12.4–15.4)
EOSINOPHIL # BLD: 0 K/UL (ref 0–0.6)
EOSINOPHIL NFR BLD: 0 %
GFR SERPLBLD CREATININE-BSD FMLA CKD-EPI: >90 ML/MIN/{1.73_M2}
GLUCOSE SERPL-MCNC: 139 MG/DL (ref 70–99)
HCT VFR BLD AUTO: 25.4 % (ref 36–48)
HGB BLD-MCNC: 8.3 G/DL (ref 12–16)
LYMPHOCYTES # BLD: 0.4 K/UL (ref 1–5.1)
LYMPHOCYTES NFR BLD: 6.2 %
MAGNESIUM SERPL-MCNC: 1.7 MG/DL (ref 1.8–2.4)
MCH RBC QN AUTO: 28.5 PG (ref 26–34)
MCHC RBC AUTO-ENTMCNC: 32.6 G/DL (ref 31–36)
MCV RBC AUTO: 87.4 FL (ref 80–100)
MONOCYTES # BLD: 0.6 K/UL (ref 0–1.3)
MONOCYTES NFR BLD: 9.4 %
NEUTROPHILS # BLD: 5.5 K/UL (ref 1.7–7.7)
NEUTROPHILS NFR BLD: 84.3 %
PLATELET # BLD AUTO: 92 K/UL (ref 135–450)
PMV BLD AUTO: 10.2 FL (ref 5–10.5)
POTASSIUM SERPL-SCNC: 4.3 MMOL/L (ref 3.5–5.1)
PROT SERPL-MCNC: 5.2 G/DL (ref 6.4–8.2)
RBC # BLD AUTO: 2.91 M/UL (ref 4–5.2)
SODIUM SERPL-SCNC: 137 MMOL/L (ref 136–145)
WBC # BLD AUTO: 6.5 K/UL (ref 4–11)

## 2024-04-01 PROCEDURE — 80053 COMPREHEN METABOLIC PANEL: CPT

## 2024-04-01 PROCEDURE — 6370000000 HC RX 637 (ALT 250 FOR IP): Performed by: STUDENT IN AN ORGANIZED HEALTH CARE EDUCATION/TRAINING PROGRAM

## 2024-04-01 PROCEDURE — 83735 ASSAY OF MAGNESIUM: CPT

## 2024-04-01 PROCEDURE — 85025 COMPLETE CBC W/AUTO DIFF WBC: CPT

## 2024-04-01 PROCEDURE — 94640 AIRWAY INHALATION TREATMENT: CPT

## 2024-04-01 PROCEDURE — 6360000002 HC RX W HCPCS: Performed by: INTERNAL MEDICINE

## 2024-04-01 PROCEDURE — 6370000000 HC RX 637 (ALT 250 FOR IP): Performed by: INTERNAL MEDICINE

## 2024-04-01 PROCEDURE — 94760 N-INVAS EAR/PLS OXIMETRY 1: CPT

## 2024-04-01 PROCEDURE — 94669 MECHANICAL CHEST WALL OSCILL: CPT

## 2024-04-01 PROCEDURE — 2580000003 HC RX 258: Performed by: STUDENT IN AN ORGANIZED HEALTH CARE EDUCATION/TRAINING PROGRAM

## 2024-04-01 PROCEDURE — 6360000002 HC RX W HCPCS: Performed by: STUDENT IN AN ORGANIZED HEALTH CARE EDUCATION/TRAINING PROGRAM

## 2024-04-01 RX ORDER — ASPIRIN 81 MG/1
81 TABLET, CHEWABLE ORAL DAILY
Qty: 30 TABLET | Refills: 3 | Status: SHIPPED | OUTPATIENT
Start: 2024-04-01

## 2024-04-01 RX ORDER — MULTIVIT-MIN/IRON FUM/FOLIC AC 7.5 MG-4
1 TABLET ORAL DAILY
Qty: 30 TABLET | Refills: 3 | Status: SHIPPED | OUTPATIENT
Start: 2024-04-01

## 2024-04-01 RX ORDER — AMIODARONE HYDROCHLORIDE 200 MG/1
200 TABLET ORAL DAILY
Qty: 30 TABLET | Refills: 0 | Status: SHIPPED | OUTPATIENT
Start: 2024-04-01

## 2024-04-01 RX ORDER — POTASSIUM CHLORIDE 20 MEQ/1
20 TABLET, EXTENDED RELEASE ORAL DAILY
Qty: 5 TABLET | Refills: 0 | Status: SHIPPED | OUTPATIENT
Start: 2024-04-01 | End: 2024-04-06

## 2024-04-01 RX ORDER — MAGNESIUM SULFATE IN WATER 40 MG/ML
4000 INJECTION, SOLUTION INTRAVENOUS ONCE
Status: COMPLETED | OUTPATIENT
Start: 2024-04-01 | End: 2024-04-01

## 2024-04-01 RX ORDER — THIAMINE MONONITRATE (VIT B1) 100 MG
100 TABLET ORAL DAILY
Qty: 60 TABLET | Refills: 0 | Status: SHIPPED | OUTPATIENT
Start: 2024-04-01 | End: 2024-05-31

## 2024-04-01 RX ORDER — FERROUS SULFATE 325(65) MG
325 TABLET ORAL 2 TIMES DAILY
Qty: 180 TABLET | Refills: 1 | Status: SHIPPED | OUTPATIENT
Start: 2024-04-01

## 2024-04-01 RX ORDER — CYCLOBENZAPRINE HCL 5 MG
5 TABLET ORAL 2 TIMES DAILY PRN
Qty: 10 TABLET | Refills: 0 | Status: SHIPPED | OUTPATIENT
Start: 2024-04-01 | End: 2024-04-11

## 2024-04-01 RX ADMIN — POTASSIUM CHLORIDE 40 MEQ: 1500 TABLET, EXTENDED RELEASE ORAL at 09:51

## 2024-04-01 RX ADMIN — MOMETASONE FUROATE AND FORMOTEROL FUMARATE DIHYDRATE 2 PUFF: 100; 5 AEROSOL RESPIRATORY (INHALATION) at 09:01

## 2024-04-01 RX ADMIN — DIPHENHYDRAMINE HCL 10 ML: 12.5 SOLUTION ORAL at 11:52

## 2024-04-01 RX ADMIN — BUPRENORPHINE HYDROCHLORIDE AND NALOXONE HYDROCHLORIDE DIHYDRATE 1 TABLET: 2; .5 TABLET SUBLINGUAL at 09:51

## 2024-04-01 RX ADMIN — IPRATROPIUM BROMIDE AND ALBUTEROL 1 PUFF: 20; 100 SPRAY, METERED RESPIRATORY (INHALATION) at 16:56

## 2024-04-01 RX ADMIN — GABAPENTIN 300 MG: 300 CAPSULE ORAL at 09:50

## 2024-04-01 RX ADMIN — ASPIRIN 81 MG: 81 TABLET, CHEWABLE ORAL at 09:51

## 2024-04-01 RX ADMIN — IPRATROPIUM BROMIDE AND ALBUTEROL 1 PUFF: 20; 100 SPRAY, METERED RESPIRATORY (INHALATION) at 09:01

## 2024-04-01 RX ADMIN — Medication 10 ML: at 10:00

## 2024-04-01 RX ADMIN — Medication 100 MG: at 09:50

## 2024-04-01 RX ADMIN — AMIODARONE HYDROCHLORIDE 200 MG: 200 TABLET ORAL at 09:51

## 2024-04-01 RX ADMIN — CYCLOBENZAPRINE 10 MG: 10 TABLET, FILM COATED ORAL at 14:34

## 2024-04-01 RX ADMIN — GABAPENTIN 300 MG: 300 CAPSULE ORAL at 14:34

## 2024-04-01 RX ADMIN — CYCLOBENZAPRINE 10 MG: 10 TABLET, FILM COATED ORAL at 09:50

## 2024-04-01 RX ADMIN — PROCHLORPERAZINE EDISYLATE 10 MG: 5 INJECTION INTRAMUSCULAR; INTRAVENOUS at 06:04

## 2024-04-01 RX ADMIN — ESCITALOPRAM OXALATE 20 MG: 10 TABLET ORAL at 09:50

## 2024-04-01 RX ADMIN — PANTOPRAZOLE SODIUM 40 MG: 40 TABLET, DELAYED RELEASE ORAL at 06:03

## 2024-04-01 RX ADMIN — LEVOTHYROXINE SODIUM 50 MCG: 0.05 TABLET ORAL at 06:04

## 2024-04-01 RX ADMIN — VALACYCLOVIR 500 MG: 500 TABLET, FILM COATED ORAL at 09:51

## 2024-04-01 RX ADMIN — HYDROXYZINE HYDROCHLORIDE 25 MG: 10 TABLET, FILM COATED ORAL at 10:53

## 2024-04-01 RX ADMIN — MAGNESIUM SULFATE HEPTAHYDRATE 4000 MG: 40 INJECTION, SOLUTION INTRAVENOUS at 09:59

## 2024-04-01 ASSESSMENT — PAIN SCALES - GENERAL
PAINLEVEL_OUTOF10: 6
PAINLEVEL_OUTOF10: 0

## 2024-04-01 ASSESSMENT — PAIN DESCRIPTION - DESCRIPTORS: DESCRIPTORS: ACHING

## 2024-04-01 ASSESSMENT — PAIN DESCRIPTION - ORIENTATION: ORIENTATION: MID

## 2024-04-01 ASSESSMENT — PAIN DESCRIPTION - LOCATION: LOCATION: BACK

## 2024-04-01 NOTE — DISCHARGE SUMMARY
intact, grossly non-focal.  Psychiatric:  Alert and oriented, thought content appropriate, normal insight  Capillary Refill: Brisk,< 3 seconds   Peripheral Pulses: +2 palpable, equal bilaterally       Labs: For convenience and continuity at follow-up the following most recent labs are provided:      CBC:    Lab Results   Component Value Date/Time    WBC 6.5 04/01/2024 05:55 AM    HGB 8.3 04/01/2024 05:55 AM    HCT 25.4 04/01/2024 05:55 AM    PLT 92 04/01/2024 05:55 AM       Renal:    Lab Results   Component Value Date/Time     04/01/2024 05:55 AM    K 4.3 04/01/2024 05:55 AM    K 3.9 03/25/2024 06:35 AM     04/01/2024 05:55 AM    CO2 27 04/01/2024 05:55 AM    BUN 10 04/01/2024 05:55 AM    CREATININE 0.6 04/01/2024 05:55 AM    CALCIUM 8.3 04/01/2024 05:55 AM    PHOS 2.8 03/27/2024 10:00 AM         Significant Diagnostic Studies    Radiology:   XR CHEST PORTABLE   Final Result   1. Mild residual patchy opacities in the lower lungs with interval   improvement.   2. Trace bilateral pleural effusion.         CT CHEST PULMONARY EMBOLISM W CONTRAST   Final Result   1. No evidence of pulmonary embolism to the proximal segmental level.   Evaluation beyond this is limited due to parenchymal disease and   inhomogeneous enhancement.   2. Diffuse ground-glass opacities throughout all lobes of the lungs with   upper lobe predominance in a peribronchial distribution. Differential   considerations include pulmonary edema, atypical infection, hypersensitivity   pneumonitis or alveolar hemorrhage.  Superimposed moderate bilateral pleural   effusions are present.   3. Contour irregularity of the manubrium may represent nondisplaced fracture.   Correlate with physical exam for point tenderness.   4. Perihepatic ascites, incompletely visualized.   5. Enlarged main pulmonary artery consistent with pulmonary hypertension.         XR CHEST PORTABLE   Final Result   1. Diffuse hazy bilateral airspace opacities.  Differential

## 2024-04-01 NOTE — PLAN OF CARE
Problem: Discharge Planning  Goal: Discharge to home or other facility with appropriate resources  3/26/2024 0855 by Tavon Dash RN  Outcome: Progressing     Problem: Pain  Goal: Verbalizes/displays adequate comfort level or baseline comfort level  3/26/2024 0855 by Tavon Dash RN  Outcome: Progressing     Problem: Skin/Tissue Integrity  Goal: Absence of new skin breakdown  Description: 1.  Monitor for areas of redness and/or skin breakdown  2.  Assess vascular access sites hourly  3.  Every 4-6 hours minimum:  Change oxygen saturation probe site  4.  Every 4-6 hours:  If on nasal continuous positive airway pressure, respiratory therapy assess nares and determine need for appliance change or resting period.  3/26/2024 0855 by Tavon Dash RN  Outcome: Progressing     Problem: Safety - Adult  Goal: Free from fall injury  3/26/2024 0855 by Tavon Dash RN  Outcome: Progressing     Problem: ABCDS Injury Assessment  Goal: Absence of physical injury  3/26/2024 0855 by Tavon Dash RN  Outcome: Progressing     Problem: Neurosensory - Adult  Goal: Achieves stable or improved neurological status  3/26/2024 0855 by Tavon Dash RN  Outcome: Progressing     Problem: Neurosensory - Adult  Goal: Absence of seizures  3/26/2024 0855 by Tavon Dash RN  Outcome: Progressing     Problem: Neurosensory - Adult  Goal: Remains free of injury related to seizures activity  3/26/2024 0855 by Tavon Dash RN  Outcome: Progressing     
  Problem: Discharge Planning  Goal: Discharge to home or other facility with appropriate resources  3/27/2024 0935 by Tavon Dash RN  Outcome: Progressing     Problem: Pain  Goal: Verbalizes/displays adequate comfort level or baseline comfort level  3/27/2024 0935 by Tavon Dash RN  Outcome: Progressing     Problem: Skin/Tissue Integrity  Goal: Absence of new skin breakdown  Description: 1.  Monitor for areas of redness and/or skin breakdown  2.  Assess vascular access sites hourly  3.  Every 4-6 hours minimum:  Change oxygen saturation probe site  4.  Every 4-6 hours:  If on nasal continuous positive airway pressure, respiratory therapy assess nares and determine need for appliance change or resting period.  3/27/2024 0935 by Tavon Dash RN  Outcome: Progressing     Problem: Safety - Adult  Goal: Free from fall injury  3/27/2024 0935 by Tavon Dash RN  Outcome: Progressing     Problem: ABCDS Injury Assessment  Goal: Absence of physical injury  3/27/2024 0935 by Tavon Dash RN  Outcome: Progressing     Problem: Neurosensory - Adult  Goal: Achieves stable or improved neurological status  3/27/2024 0935 by Tavon Dash RN  Outcome: Progressing     Problem: Neurosensory - Adult  Goal: Absence of seizures  3/27/2024 0935 by Tavon Dash RN  Outcome: Progressing     Problem: Neurosensory - Adult  Goal: Remains free of injury related to seizures activity  3/27/2024 0935 by Tavon Dash RN  Outcome: Progressing     Problem: Neurosensory - Adult  Goal: Achieves maximal functionality and self care  3/27/2024 0935 by Tavon Dash RN  Outcome: Progressing     Problem: Respiratory - Adult  Goal: Achieves optimal ventilation and oxygenation  3/27/2024 0935 by Tavon Dash RN  Outcome: Progressing     Problem: Cardiovascular - Adult  Goal: Maintains optimal cardiac output and hemodynamic stability  3/27/2024 0935 by Tavon Dash RN  Outcome: Progressing     Problem: Cardiovascular 
  Problem: Discharge Planning  Goal: Discharge to home or other facility with appropriate resources  3/28/2024 0525 by Leigh Ann Armas RN  Outcome: Progressing  3/28/2024 0524 by Leigh Ann Armas RN  Outcome: Progressing     Problem: Pain  Goal: Verbalizes/displays adequate comfort level or baseline comfort level  3/28/2024 0525 by Leigh Ann Armas RN  Outcome: Progressing  3/28/2024 0524 by Leigh Ann Armas RN  Outcome: Progressing     Problem: Skin/Tissue Integrity  Goal: Absence of new skin breakdown  Description: 1.  Monitor for areas of redness and/or skin breakdown  2.  Assess vascular access sites hourly  3.  Every 4-6 hours minimum:  Change oxygen saturation probe site  4.  Every 4-6 hours:  If on nasal continuous positive airway pressure, respiratory therapy assess nares and determine need for appliance change or resting period.  3/28/2024 0525 by Leigh Ann Armas RN  Outcome: Progressing  3/28/2024 0524 by Leigh Ann Armas RN  Outcome: Progressing     Problem: Safety - Adult  Goal: Free from fall injury  3/28/2024 0525 by Leigh Ann Armas RN  Outcome: Progressing  3/28/2024 0524 by Leigh Ann Armas RN  Outcome: Progressing     Problem: ABCDS Injury Assessment  Goal: Absence of physical injury  3/28/2024 0525 by Leigh Ann Armas RN  Outcome: Progressing  3/28/2024 0524 by Leigh Ann Armas RN  Outcome: Progressing     Problem: Neurosensory - Adult  Goal: Achieves stable or improved neurological status  3/28/2024 0525 by Leigh Ann Armas RN  Outcome: Progressing  3/28/2024 0524 by Leigh Ann Armas RN  Outcome: Progressing  Goal: Absence of seizures  3/28/2024 0525 by Leigh Ann Armas RN  Outcome: Progressing  3/28/2024 0524 by Leigh Ann Armas RN  Outcome: Progressing  Goal: Remains free of injury related to seizures activity  3/28/2024 0525 by Leigh Ann Armas RN  Outcome: Progressing  3/28/2024 0524 by Leigh Ann Armas RN  Outcome: Progressing  Goal: Achieves maximal functionality and self care  3/28/2024 
  Problem: Discharge Planning  Goal: Discharge to home or other facility with appropriate resources  3/30/2024 1038 by Dania Roldan RN  Outcome: Progressing  Flowsheets (Taken 3/30/2024 0810)  Discharge to home or other facility with appropriate resources:   Identify barriers to discharge with patient and caregiver   Arrange for needed discharge resources and transportation as appropriate   Refer to discharge planning if patient needs post-hospital services based on physician order or complex needs related to functional status, cognitive ability or social support system  3/30/2024 0233 by Makeda Oshea RN  Outcome: Progressing     Problem: Pain  Goal: Verbalizes/displays adequate comfort level or baseline comfort level  3/30/2024 1038 by Dania Roldan RN  Outcome: Progressing  Flowsheets (Taken 3/30/2024 0801)  Verbalizes/displays adequate comfort level or baseline comfort level:   Encourage patient to monitor pain and request assistance   Assess pain using appropriate pain scale   Administer analgesics based on type and severity of pain and evaluate response   Implement non-pharmacological measures as appropriate and evaluate response   Consider cultural and social influences on pain and pain management   Notify Licensed Independent Practitioner if interventions unsuccessful or patient reports new pain  3/30/2024 0233 by Makeda Oshea RN  Outcome: Progressing     Problem: Skin/Tissue Integrity  Goal: Absence of new skin breakdown  Description: 1.  Monitor for areas of redness and/or skin breakdown  2.  Assess vascular access sites hourly  3.  Every 4-6 hours minimum:  Change oxygen saturation probe site  4.  Every 4-6 hours:  If on nasal continuous positive airway pressure, respiratory therapy assess nares and determine need for appliance change or resting period.  3/30/2024 1038 by Dania Roldan RN  Outcome: Progressing  3/30/2024 0233 by Makeda Oshea RN  Outcome: Progressing     Problem: 
  Problem: Discharge Planning  Goal: Discharge to home or other facility with appropriate resources  3/31/2024 1147 by Abbie Dan RN  Outcome: Progressing       Problem: Pain  Goal: Verbalizes/displays adequate comfort level or baseline comfort level  3/31/2024 1147 by Abbie Dan RN  Outcome: Progressing       Problem: Skin/Tissue Integrity  Goal: Absence of new skin breakdown  Description: 1.  Monitor for areas of redness and/or skin breakdown  2.  Assess vascular access sites hourly  3.  Every 4-6 hours minimum:  Change oxygen saturation probe site  4.  Every 4-6 hours:  If on nasal continuous positive airway pressure, respiratory therapy assess nares and determine need for appliance change or resting period.  3/31/2024 1147 by Abbie Dan RN  Outcome: Progressing       Problem: Safety - Adult  Goal: Free from fall injury  3/31/2024 1147 by Abbie Dan RN  Outcome: Progressing       Problem: ABCDS Injury Assessment  Goal: Absence of physical injury  3/31/2024 1147 by Abbie Dan RN  Outcome: Progressing       Problem: Neurosensory - Adult  Goal: Achieves stable or improved neurological status  Outcome: Progressing  Goal: Absence of seizures  Outcome: Progressing  Goal: Remains free of injury related to seizures activity  Outcome: Progressing  Goal: Achieves maximal functionality and self care  3/31/2024 1147 by Abbie Dan RN  Outcome: Progressing       Problem: Respiratory - Adult  Goal: Achieves optimal ventilation and oxygenation  3/31/2024 1147 by Abbie Dan RN  Outcome: Progressing       Problem: Cardiovascular - Adult  Goal: Maintains optimal cardiac output and hemodynamic stability  Outcome: Progressing  Goal: Absence of cardiac dysrhythmias or at baseline  Outcome: Progressing     Problem: Skin/Tissue Integrity - Adult  Goal: Skin integrity remains intact  3/31/2024 1147 by Abbie Dan RN  Outcome: Progressing       Problem: Musculoskeletal - Adult  Goal: Return mobility to safest level 
  Problem: Discharge Planning  Goal: Discharge to home or other facility with appropriate resources  4/1/2024 1054 by Julieta Marr RN  Outcome: Adequate for Discharge     Problem: Pain  Goal: Verbalizes/displays adequate comfort level or baseline comfort level  4/1/2024 1054 by Julieta Marr RN  Outcome: Adequate for Discharge     Problem: Skin/Tissue Integrity  Goal: Absence of new skin breakdown  Description: 1.  Monitor for areas of redness and/or skin breakdown  2.  Assess vascular access sites hourly  3.  Every 4-6 hours minimum:  Change oxygen saturation probe site  4.  Every 4-6 hours:  If on nasal continuous positive airway pressure, respiratory therapy assess nares and determine need for appliance change or resting period.  4/1/2024 1054 by Julieta Marr RN  Outcome: Adequate for Discharge     Problem: Safety - Adult  Goal: Free from fall injury  4/1/2024 1054 by Julieta Marr RN  Outcome: Adequate for Discharge     Problem: ABCDS Injury Assessment  Goal: Absence of physical injury  Outcome: Adequate for Discharge     Problem: Neurosensory - Adult  Goal: Achieves stable or improved neurological status  Outcome: Adequate for Discharge     Problem: Neurosensory - Adult  Goal: Absence of seizures  Outcome: Adequate for Discharge     Problem: Neurosensory - Adult  Goal: Remains free of injury related to seizures activity  Outcome: Adequate for Discharge     Problem: Neurosensory - Adult  Goal: Achieves maximal functionality and self care  Outcome: Adequate for Discharge     Problem: Respiratory - Adult  Goal: Achieves optimal ventilation and oxygenation  Outcome: Adequate for Discharge     Problem: Cardiovascular - Adult  Goal: Maintains optimal cardiac output and hemodynamic stability  Outcome: Adequate for Discharge     Problem: Cardiovascular - Adult  Goal: Absence of cardiac dysrhythmias or at baseline  4/1/2024 1054 by Julieta Marr RN  Outcome: Adequate for Discharge   
  Problem: Discharge Planning  Goal: Discharge to home or other facility with appropriate resources  Outcome: Progressing     Problem: Pain  Goal: Verbalizes/displays adequate comfort level or baseline comfort level  Outcome: Progressing     Problem: Safety - Adult  Goal: Free from fall injury  Outcome: Progressing     Problem: ABCDS Injury Assessment  Goal: Absence of physical injury  Outcome: Progressing     Problem: Neurosensory - Adult  Goal: Achieves maximal functionality and self care  Outcome: Progressing     Problem: Respiratory - Adult  Goal: Achieves optimal ventilation and oxygenation  Outcome: Progressing     Problem: Musculoskeletal - Adult  Goal: Return ADL status to a safe level of function  Outcome: Progressing     
  Problem: Discharge Planning  Goal: Discharge to home or other facility with appropriate resources  Outcome: Progressing     Problem: Pain  Goal: Verbalizes/displays adequate comfort level or baseline comfort level  Outcome: Progressing     Problem: Skin/Tissue Integrity  Goal: Absence of new skin breakdown  Description: 1.  Monitor for areas of redness and/or skin breakdown  2.  Assess vascular access sites hourly  3.  Every 4-6 hours minimum:  Change oxygen saturation probe site  4.  Every 4-6 hours:  If on nasal continuous positive airway pressure, respiratory therapy assess nares and determine need for appliance change or resting period.  Outcome: Progressing     Problem: Safety - Adult  Goal: Free from fall injury  Outcome: Progressing     Problem: ABCDS Injury Assessment  Goal: Absence of physical injury  Outcome: Progressing     Problem: Neurosensory - Adult  Goal: Achieves maximal functionality and self care  Outcome: Progressing     Problem: Respiratory - Adult  Goal: Achieves optimal ventilation and oxygenation  Outcome: Progressing     Problem: Skin/Tissue Integrity - Adult  Goal: Skin integrity remains intact  Outcome: Progressing     Problem: Musculoskeletal - Adult  Goal: Return ADL status to a safe level of function  Outcome: Progressing     Problem: Infection - Adult  Goal: Absence of infection at discharge  Outcome: Progressing     
  Problem: Discharge Planning  Goal: Discharge to home or other facility with appropriate resources  Outcome: Progressing     Problem: Pain  Goal: Verbalizes/displays adequate comfort level or baseline comfort level  Outcome: Progressing     Problem: Skin/Tissue Integrity  Goal: Absence of new skin breakdown  Description: 1.  Monitor for areas of redness and/or skin breakdown  2.  Assess vascular access sites hourly  3.  Every 4-6 hours minimum:  Change oxygen saturation probe site  4.  Every 4-6 hours:  If on nasal continuous positive airway pressure, respiratory therapy assess nares and determine need for appliance change or resting period.  Outcome: Progressing     Problem: Safety - Adult  Goal: Free from fall injury  Outcome: Progressing     Problem: ABCDS Injury Assessment  Goal: Absence of physical injury  Outcome: Progressing     Problem: Neurosensory - Adult  Goal: Achieves stable or improved neurological status  Outcome: Progressing     Problem: Neurosensory - Adult  Goal: Absence of seizures  Outcome: Progressing     Problem: Neurosensory - Adult  Goal: Remains free of injury related to seizures activity  Outcome: Progressing     Problem: Neurosensory - Adult  Goal: Achieves maximal functionality and self care  Outcome: Progressing     Problem: Respiratory - Adult  Goal: Achieves optimal ventilation and oxygenation  Outcome: Progressing     Problem: Cardiovascular - Adult  Goal: Maintains optimal cardiac output and hemodynamic stability  Outcome: Progressing     Problem: Cardiovascular - Adult  Goal: Absence of cardiac dysrhythmias or at baseline  Outcome: Progressing     Problem: Skin/Tissue Integrity - Adult  Goal: Skin integrity remains intact  Outcome: Progressing     Problem: Musculoskeletal - Adult  Goal: Return mobility to safest level of function  Outcome: Progressing     Problem: Musculoskeletal - Adult  Goal: Return ADL status to a safe level of function  Outcome: Progressing     Problem: 
  Problem: Discharge Planning  Goal: Discharge to home or other facility with appropriate resources  Outcome: Progressing     Problem: Pain  Goal: Verbalizes/displays adequate comfort level or baseline comfort level  Outcome: Progressing     Problem: Skin/Tissue Integrity  Goal: Absence of new skin breakdown  Description: 1.  Monitor for areas of redness and/or skin breakdown  2.  Assess vascular access sites hourly  3.  Every 4-6 hours minimum:  Change oxygen saturation probe site  4.  Every 4-6 hours:  If on nasal continuous positive airway pressure, respiratory therapy assess nares and determine need for appliance change or resting period.  Outcome: Progressing     Problem: Safety - Adult  Goal: Free from fall injury  Outcome: Progressing     Problem: Cardiovascular - Adult  Goal: Absence of cardiac dysrhythmias or at baseline  Outcome: Progressing     Problem: Skin/Tissue Integrity - Adult  Goal: Skin integrity remains intact  Outcome: Progressing     Problem: Musculoskeletal - Adult  Goal: Return ADL status to a safe level of function  Outcome: Progressing     
  Problem: Discharge Planning  Goal: Discharge to home or other facility with appropriate resources  Outcome: Progressing  Flowsheets (Taken 3/23/2024 0244)  Discharge to home or other facility with appropriate resources:   Identify barriers to discharge with patient and caregiver   Arrange for needed discharge resources and transportation as appropriate   Identify discharge learning needs (meds, wound care, etc)   Refer to discharge planning if patient needs post-hospital services based on physician order or complex needs related to functional status, cognitive ability or social support system     Problem: Pain  Goal: Verbalizes/displays adequate comfort level or baseline comfort level  Outcome: Progressing  Flowsheets (Taken 3/23/2024 0244)  Verbalizes/displays adequate comfort level or baseline comfort level:   Encourage patient to monitor pain and request assistance   Administer analgesics based on type and severity of pain and evaluate response   Assess pain using appropriate pain scale   Implement non-pharmacological measures as appropriate and evaluate response   Notify Licensed Independent Practitioner if interventions unsuccessful or patient reports new pain     Problem: Skin/Tissue Integrity  Goal: Absence of new skin breakdown  Description: 1.  Monitor for areas of redness and/or skin breakdown  2.  Assess vascular access sites hourly  3.  Every 4-6 hours minimum:  Change oxygen saturation probe site  4.  Every 4-6 hours:  If on nasal continuous positive airway pressure, respiratory therapy assess nares and determine need for appliance change or resting period.  Outcome: Progressing     Problem: Safety - Adult  Goal: Free from fall injury  Outcome: Progressing     Problem: ABCDS Injury Assessment  Goal: Absence of physical injury  Outcome: Progressing     
Physical Injury: Implement safety measures based on patient assessment     Problem: Neurosensory - Adult  Goal: Achieves stable or improved neurological status  Outcome: Progressing  Flowsheets (Taken 3/23/2024 1924)  Achieves stable or improved neurological status: Assess for and report changes in neurological status  Goal: Absence of seizures  Outcome: Progressing  Flowsheets (Taken 3/23/2024 1924)  Absence of seizures: Monitor for seizure activity.  If seizure occurs, document type and location of movements and any associated apnea  Goal: Remains free of injury related to seizures activity  Outcome: Progressing  Flowsheets (Taken 3/23/2024 1924)  Remains free of injury related to seizure activity: Monitor patient for seizure activity, document and report duration and description of seizure to Licensed Independent Practitioner  Goal: Achieves maximal functionality and self care  Outcome: Progressing  Flowsheets (Taken 3/23/2024 1924)  Achieves maximal functionality and self care:   Monitor swallowing and airway patency with patient fatigue and changes in neurological status   Encourage and assist patient to increase activity and self care with guidance from physical therapy/occupational therapy     Problem: Respiratory - Adult  Goal: Achieves optimal ventilation and oxygenation  Outcome: Progressing  Flowsheets (Taken 3/23/2024 1924)  Achieves optimal ventilation and oxygenation:   Assess for changes in respiratory status   Assess for changes in mentation and behavior   Position to facilitate oxygenation and minimize respiratory effort   Oxygen supplementation based on oxygen saturation or arterial blood gases   Initiate smoking cessation protocol as indicated   Encourage broncho-pulmonary hygiene including cough, deep breathe, incentive spirometry   Assess and instruct to report shortness of breath or any respiratory difficulty   Respiratory therapy support as indicated     Problem: Cardiovascular - Adult  Goal: 
delirium, dementia, or psychosis is improved or at baseline  Description: INTERVENTIONS:  1. Assess for possible contributors to thought disturbance, including medications, impaired vision or hearing, underlying metabolic abnormalities, dehydration, psychiatric diagnoses, and notify attending LIP  2. [place] high risk fall precautions, as indicated  3. Provide frequent short contacts to provide reality reorientation, refocusing and direction  4. Decrease environmental stimuli, including noise as appropriate  5. Monitor and intervene to maintain adequate nutrition, hydration, elimination, sleep and activity  6. If unable to ensure safety without constant attention obtain sitter and review sitter guidelines with assigned personnel  7. Initiate Psychosocial CNS and Spiritual Care consult, as indicated  Outcome: Progressing  Flowsheets (Taken 3/29/2024 1208)  Effect of thought disturbance (confusion, delirium, dementia, or psychosis) are managed with adequate functional status: Assess for contributors to thought disturbance, including medications, impaired vision or hearing, underlying metabolic abnormalities, dehydration, psychiatric diagnoses, notify LIP     Problem: Behavior  Goal: Pt/Family maintain appropriate behavior and adhere to behavioral management agreement, if implemented  Description: INTERVENTIONS:  1. Assess patient/family's coping skills and  non-compliant behavior (including use of illegal substances)  2. Notify security of behavior or suspected illegal substances which indicate the need for search of the family and/or belongings  3. Encourage verbalization of thoughts and concerns in a socially appropriate manner  4. Utilize positive, consistent limit setting strategies supporting safety of patient, staff and others  5. Encourage participation in the decision making process about the behavioral management agreement  6. If a visitor's behavior poses a threat to safety call refer to organization

## 2024-04-01 NOTE — CARE COORDINATION
Case Management Discharge Note          Date / Time of Note: 4/1/2024 12:55 PM                  Patient Name: Lali Cazares   YOB: 1975  Diagnosis: Hypokalemia [E87.6]  Ventricular tachycardia (HCC) [I47.20]  V-tach (HCC) [I47.20]  Pneumonia due to infectious organism, unspecified laterality, unspecified part of lung [J18.9]  COVID [U07.1]   Date / Time: 3/22/2024 10:17 PM    Financial:  Payor: [facility] / Plan: [facility] MEDICAID / Product Type: *No Product type* /      Pharmacy:    [facility] [id number] [place], [place] - [address] - P [phone] - F [phone]  [address] [facility] [address]  Phone: [phone] Fax: [phone]      Assistance purchasing medications?: Potential Assistance Purchasing Medications: No  Assistance provided by Case Management: None at this time    DISCHARGE Disposition: Home- No Services Needed    Transportation:  Transportation PLAN for discharge:  insurance transport      Additional CM Notes:   DC to home with family.  Will likely need transportation.  Active with [facility] for outpatient treatment. Currently on Room air.    The Plan for Transition of Care is related to the following treatment goals of Hypokalemia [E87.6]  Ventricular tachycardia (HCC) [I47.20]  V-tach (HCC) [I47.20]  Pneumonia due to infectious organism, unspecified laterality, unspecified part of lung [J18.9]  COVID [U07.1]    The Patient and/or patient representative Lali and her family were provided with a choice of provider and agrees with the discharge plan Yes    Freedom of choice list was provided with basic dialogue that supports the patient's individualized plan of care/goals and shares the quality data associated with the providers. Not Indicated    Vandana Coburn RN  [facility]   Case Management Department  Ph: [phone]  
DISCHARGE PLANNING:    DC plan to return home.  Active with [facility] outpatient treatment for ETOH services.  Arlene with [facility] following.  Watching for possible new home oxygen.  Currently on 1 lpm/RA baseline.      #[phone]  Electronically signed by Vandana Coburn RN on 3/27/2024 at 2:03 PM    
Discharge Need: Transportation home  SW contacted by Bedside RN- Julieta.  Patient needs ride home, SW placed ride in roundtrip for medical sedan.  Call information to come to Julieta's phone.  MARTAH available as needed  Amanda CHUNG.MSW  [phone]  
assistance at DC: (P) Other (comment) (unsure)  Would you like Case Management to discuss the discharge plan with any other family members/significant others, and if so, who? (P) No  Plans to Return to Present Housing: (P) Unknown at present  Other Identified Issues/Barriers to RETURNING to current housing: substance abuse  Potential Assistance needed at discharge: (P) Durable Medical Equipment            Potential DME: (P) Oxygen Therapy (Comment) (not home dependent)  Patient expects to discharge to: (P) House  Plan for transportation at discharge: (P) Self    Financial    Payor: [employer] / Plan: [facility] MEDICAID / Product Type: *No Product type* /     Does insurance require precert for SNF: Yes    Potential assistance Purchasing Medications: (P) No  Meds-to-Beds request:        [facility] [id number] - [place], [place] - [address] - P [phone] - F [phone] [address] [facility] [address]  Phone: [phone] Fax: [phone]      Notes:    Factors facilitating achievement of predicted outcomes: Family support    Barriers to discharge: Limited safety awareness and Decreased motivation    Additional Case Management Notes: met with pt at bedside.  Pt not very awake.  She did answer yes and no questions but unsure if its accurate.  Pt reports she will have a ride home.  She says she does go to [facility].  She reports she is ok with Arlene coming to talk to her and knows that referral was made last admission as well.  She reports she doesn't need any other resources and has no dc needs.  Pt is not normally on O2.    The Plan for Transition of Care is related to the following treatment goals of Hypokalemia [E87.6]  Ventricular tachycardia (HCC) [I47.20]  V-tach (HCC) [I47.20]  Pneumonia due to infectious organism, unspecified laterality, unspecified part of lung [J18.9]  COVID [U07.1]      JULIET Horowitz  Case Management Department  Ph: [phone] Fax: [phone]       03/25/24

## 2024-04-29 ENCOUNTER — OFFICE VISIT (OUTPATIENT)
Dept: INTERNAL MEDICINE CLINIC | Age: 49
End: 2024-04-29
Payer: COMMERCIAL

## 2024-04-29 VITALS
HEART RATE: 93 BPM | TEMPERATURE: 98 F | WEIGHT: 129.5 LBS | SYSTOLIC BLOOD PRESSURE: 110 MMHG | OXYGEN SATURATION: 99 % | DIASTOLIC BLOOD PRESSURE: 74 MMHG | BODY MASS INDEX: 20.28 KG/M2

## 2024-04-29 DIAGNOSIS — R94.31 PROLONGED QT INTERVAL: ICD-10-CM

## 2024-04-29 DIAGNOSIS — F10.10 ALCOHOL ABUSE: ICD-10-CM

## 2024-04-29 DIAGNOSIS — F41.9 ANXIETY: ICD-10-CM

## 2024-04-29 DIAGNOSIS — K74.60 HEPATIC CIRRHOSIS, UNSPECIFIED HEPATIC CIRRHOSIS TYPE, UNSPECIFIED WHETHER ASCITES PRESENT (HCC): ICD-10-CM

## 2024-04-29 DIAGNOSIS — E03.9 HYPOTHYROIDISM, UNSPECIFIED TYPE: ICD-10-CM

## 2024-04-29 DIAGNOSIS — J45.909 MODERATE ASTHMA WITHOUT COMPLICATION, UNSPECIFIED WHETHER PERSISTENT: ICD-10-CM

## 2024-04-29 DIAGNOSIS — F14.10 COCAINE ABUSE (HCC): ICD-10-CM

## 2024-04-29 DIAGNOSIS — M54.40 CHRONIC MIDLINE LOW BACK PAIN WITH SCIATICA, SCIATICA LATERALITY UNSPECIFIED: ICD-10-CM

## 2024-04-29 DIAGNOSIS — B18.2 HEP C W/O COMA, CHRONIC (HCC): ICD-10-CM

## 2024-04-29 DIAGNOSIS — K21.9 GASTROESOPHAGEAL REFLUX DISEASE WITHOUT ESOPHAGITIS: ICD-10-CM

## 2024-04-29 DIAGNOSIS — F31.30 BIPOLAR AFFECTIVE DISORDER, CURRENT EPISODE DEPRESSED, CURRENT EPISODE SEVERITY UNSPECIFIED (HCC): ICD-10-CM

## 2024-04-29 DIAGNOSIS — B00.2 RECURRENT ORAL HERPES SIMPLEX: ICD-10-CM

## 2024-04-29 DIAGNOSIS — F39 MOOD DISORDER (HCC): Primary | ICD-10-CM

## 2024-04-29 DIAGNOSIS — D50.9 IRON DEFICIENCY ANEMIA, UNSPECIFIED IRON DEFICIENCY ANEMIA TYPE: ICD-10-CM

## 2024-04-29 DIAGNOSIS — Z72.0 TOBACCO ABUSE: ICD-10-CM

## 2024-04-29 DIAGNOSIS — G89.29 CHRONIC MIDLINE LOW BACK PAIN WITH SCIATICA, SCIATICA LATERALITY UNSPECIFIED: ICD-10-CM

## 2024-04-29 DIAGNOSIS — I47.29 POLYMORPHIC VENTRICULAR TACHYCARDIA (HCC): ICD-10-CM

## 2024-04-29 LAB
ALBUMIN SERPL-MCNC: 2.9 G/DL (ref 3.4–5)
ALP SERPL-CCNC: 222 U/L (ref 40–129)
ALT SERPL-CCNC: 72 U/L (ref 10–40)
AST SERPL-CCNC: 104 U/L (ref 15–37)
BASOPHILS # BLD: 0 K/UL (ref 0–0.2)
BASOPHILS NFR BLD: 0.8 %
BILIRUB DIRECT SERPL-MCNC: 0.3 MG/DL (ref 0–0.3)
BILIRUB INDIRECT SERPL-MCNC: 0.3 MG/DL (ref 0–1)
BILIRUB SERPL-MCNC: 0.6 MG/DL (ref 0–1)
DEPRECATED RDW RBC AUTO: 20.7 % (ref 12.4–15.4)
EOSINOPHIL # BLD: 0 K/UL (ref 0–0.6)
EOSINOPHIL NFR BLD: 0.6 %
HCT VFR BLD AUTO: 26.6 % (ref 36–48)
HGB BLD-MCNC: 8.7 G/DL (ref 12–16)
LYMPHOCYTES # BLD: 1.4 K/UL (ref 1–5.1)
LYMPHOCYTES NFR BLD: 25.6 %
MAGNESIUM SERPL-MCNC: 1.8 MG/DL (ref 1.8–2.4)
MCH RBC QN AUTO: 29.5 PG (ref 26–34)
MCHC RBC AUTO-ENTMCNC: 32.8 G/DL (ref 31–36)
MCV RBC AUTO: 90 FL (ref 80–100)
MONOCYTES # BLD: 0.7 K/UL (ref 0–1.3)
MONOCYTES NFR BLD: 12.3 %
NEUTROPHILS # BLD: 3.4 K/UL (ref 1.7–7.7)
NEUTROPHILS NFR BLD: 60.7 %
PLATELET # BLD AUTO: 104 K/UL (ref 135–450)
PMV BLD AUTO: 8.4 FL (ref 5–10.5)
PROT SERPL-MCNC: 4.8 G/DL (ref 6.4–8.2)
RBC # BLD AUTO: 2.95 M/UL (ref 4–5.2)
TSH SERPL DL<=0.005 MIU/L-ACNC: 14.6 UIU/ML (ref 0.27–4.2)
WBC # BLD AUTO: 5.6 K/UL (ref 4–11)

## 2024-04-29 PROCEDURE — G8427 DOCREV CUR MEDS BY ELIG CLIN: HCPCS | Performed by: NURSE PRACTITIONER

## 2024-04-29 PROCEDURE — G8420 CALC BMI NORM PARAMETERS: HCPCS | Performed by: NURSE PRACTITIONER

## 2024-04-29 PROCEDURE — 4004F PT TOBACCO SCREEN RCVD TLK: CPT | Performed by: NURSE PRACTITIONER

## 2024-04-29 PROCEDURE — 36415 COLL VENOUS BLD VENIPUNCTURE: CPT | Performed by: NURSE PRACTITIONER

## 2024-04-29 PROCEDURE — 1111F DSCHRG MED/CURRENT MED MERGE: CPT | Performed by: NURSE PRACTITIONER

## 2024-04-29 PROCEDURE — 99215 OFFICE O/P EST HI 40 MIN: CPT | Performed by: NURSE PRACTITIONER

## 2024-04-29 RX ORDER — OMEPRAZOLE 40 MG/1
40 CAPSULE, DELAYED RELEASE ORAL
Qty: 90 CAPSULE | Refills: 1 | Status: ON HOLD | OUTPATIENT
Start: 2024-04-29 | End: 2024-05-04

## 2024-04-29 RX ORDER — TIZANIDINE 2 MG/1
2 TABLET ORAL NIGHTLY PRN
COMMUNITY
End: 2024-04-29

## 2024-04-29 RX ORDER — VALACYCLOVIR HYDROCHLORIDE 1 G/1
1000 TABLET, FILM COATED ORAL DAILY
Qty: 90 TABLET | Refills: 0 | Status: SHIPPED | OUTPATIENT
Start: 2024-04-29

## 2024-04-29 SDOH — ECONOMIC STABILITY: HOUSING INSECURITY
IN THE LAST 12 MONTHS, WAS THERE A TIME WHEN YOU DID NOT HAVE A STEADY PLACE TO SLEEP OR SLEPT IN A SHELTER (INCLUDING NOW)?: NO

## 2024-04-29 SDOH — ECONOMIC STABILITY: FOOD INSECURITY: WITHIN THE PAST 12 MONTHS, THE FOOD YOU BOUGHT JUST DIDN'T LAST AND YOU DIDN'T HAVE MONEY TO GET MORE.: NEVER TRUE

## 2024-04-29 SDOH — ECONOMIC STABILITY: FOOD INSECURITY: WITHIN THE PAST 12 MONTHS, YOU WORRIED THAT YOUR FOOD WOULD RUN OUT BEFORE YOU GOT MONEY TO BUY MORE.: NEVER TRUE

## 2024-04-29 SDOH — ECONOMIC STABILITY: INCOME INSECURITY: HOW HARD IS IT FOR YOU TO PAY FOR THE VERY BASICS LIKE FOOD, HOUSING, MEDICAL CARE, AND HEATING?: NOT HARD AT ALL

## 2024-04-29 ASSESSMENT — PATIENT HEALTH QUESTIONNAIRE - PHQ9
8. MOVING OR SPEAKING SO SLOWLY THAT OTHER PEOPLE COULD HAVE NOTICED. OR THE OPPOSITE, BEING SO FIGETY OR RESTLESS THAT YOU HAVE BEEN MOVING AROUND A LOT MORE THAN USUAL: NOT AT ALL
SUM OF ALL RESPONSES TO PHQ9 QUESTIONS 1 & 2: 4
SUM OF ALL RESPONSES TO PHQ QUESTIONS 1-9: 17
5. POOR APPETITE OR OVEREATING: NEARLY EVERY DAY
9. THOUGHTS THAT YOU WOULD BE BETTER OFF DEAD, OR OF HURTING YOURSELF: NOT AT ALL
1. LITTLE INTEREST OR PLEASURE IN DOING THINGS: MORE THAN HALF THE DAYS
SUM OF ALL RESPONSES TO PHQ QUESTIONS 1-9: 17
4. FEELING TIRED OR HAVING LITTLE ENERGY: MORE THAN HALF THE DAYS
6. FEELING BAD ABOUT YOURSELF - OR THAT YOU ARE A FAILURE OR HAVE LET YOURSELF OR YOUR FAMILY DOWN: NEARLY EVERY DAY
3. TROUBLE FALLING OR STAYING ASLEEP: MORE THAN HALF THE DAYS
SUM OF ALL RESPONSES TO PHQ QUESTIONS 1-9: 17
10. IF YOU CHECKED OFF ANY PROBLEMS, HOW DIFFICULT HAVE THESE PROBLEMS MADE IT FOR YOU TO DO YOUR WORK, TAKE CARE OF THINGS AT HOME, OR GET ALONG WITH OTHER PEOPLE: EXTREMELY DIFFICULT
SUM OF ALL RESPONSES TO PHQ QUESTIONS 1-9: 17
2. FEELING DOWN, DEPRESSED OR HOPELESS: MORE THAN HALF THE DAYS
7. TROUBLE CONCENTRATING ON THINGS, SUCH AS READING THE NEWSPAPER OR WATCHING TELEVISION: NEARLY EVERY DAY

## 2024-04-29 ASSESSMENT — ENCOUNTER SYMPTOMS
ABDOMINAL DISTENTION: 1
BACK PAIN: 1
CONSTIPATION: 0
SHORTNESS OF BREATH: 0

## 2024-04-29 NOTE — PATIENT INSTRUCTIONS
Follow up with cardiology and GI  See psychiatry and psychology   Increase valtrex to 1 gram daily (new prescription sent) to see if this helps with the sores and see ENT  Will have Zaynab, our care coordinator contact you

## 2024-04-29 NOTE — PROGRESS NOTES
0  - valACYclovir (VALTREX) 1 g tablet; Take 1 tablet by mouth daily Po daily  Dispense: 90 tablet; Refill: 0  - Kyle Fitzgerald MD, Otolaryngology, [facility]   - Increase valtrex to 1g daily, refer to ENT    15. Chronic midline low back pain with sciatica, sciatica laterality unspecified: previously treated with gabapentin   - Pt advised I will not be prescribing gabapentin due to cocaine abuse. Pt upset. Pt declines pain management referral.     16. Tobacco abuse: cessation strongly advised     Patient with complex medical needs that are complicated by alcohol and drug abuse.  Referral placed to care coordinator for assistance.    Total time spent in review of patient records, face-to-face evaluation of patient and development of plan is 45 minutes.    Orders Placed This Encounter   Procedures    Basic Metabolic Panel     Standing Status:   Future     Number of Occurrences:   1     Standing Expiration Date:   4/29/2025    Magnesium     Standing Status:   Future     Number of Occurrences:   1     Standing Expiration Date:   4/29/2025    CBC with Auto Differential     Standing Status:   Future     Number of Occurrences:   1     Standing Expiration Date:   4/29/2025    Hepatic Function Panel     Standing Status:   Future     Number of Occurrences:   1     Standing Expiration Date:   4/29/2025    TSH with Reflex     Standing Status:   Future     Number of Occurrences:   1     Standing Expiration Date:   4/29/2025    Hepatitis C RNA QNT W Genotype RFLX     Standing Status:   Future     Number of Occurrences:   1     Standing Expiration Date:   4/29/2025    [facility]FL - Anjelica Tierney MD, Gastroenterology, [facility]     Referral Priority:   Routine     Referral Type:   Eval and Treat     Referral Reason:   Specialty Services Required     Referred to Provider:   Anjelica Tierney MD     Requested Specialty:   Gastroenterology     Number of Visits Requested:   1    Ambulatory referral to Psychiatry

## 2024-04-30 ENCOUNTER — HOSPITAL ENCOUNTER (INPATIENT)
Age: 49
LOS: 4 days | Discharge: HOME | End: 2024-05-04
Attending: EMERGENCY MEDICINE | Admitting: INTERNAL MEDICINE
Payer: COMMERCIAL

## 2024-04-30 ENCOUNTER — APPOINTMENT (OUTPATIENT)
Dept: GENERAL RADIOLOGY | Age: 49
End: 2024-04-30
Payer: COMMERCIAL

## 2024-04-30 ENCOUNTER — TELEPHONE (OUTPATIENT)
Dept: FAMILY MEDICINE CLINIC | Age: 49
End: 2024-04-30

## 2024-04-30 ENCOUNTER — CARE COORDINATION (OUTPATIENT)
Dept: CARE COORDINATION | Age: 49
End: 2024-04-30

## 2024-04-30 ENCOUNTER — APPOINTMENT (OUTPATIENT)
Dept: CT IMAGING | Age: 49
End: 2024-04-30
Payer: COMMERCIAL

## 2024-04-30 DIAGNOSIS — R55 NEAR SYNCOPE: ICD-10-CM

## 2024-04-30 DIAGNOSIS — R10.9 ACUTE ABDOMINAL PAIN: ICD-10-CM

## 2024-04-30 DIAGNOSIS — D64.9 ANEMIA, UNSPECIFIED TYPE: ICD-10-CM

## 2024-04-30 DIAGNOSIS — E03.9 HYPOTHYROIDISM, UNSPECIFIED TYPE: Primary | ICD-10-CM

## 2024-04-30 DIAGNOSIS — D61.818 PANCYTOPENIA (HCC): ICD-10-CM

## 2024-04-30 DIAGNOSIS — F10.10 ALCOHOL ABUSE: ICD-10-CM

## 2024-04-30 DIAGNOSIS — F14.90 COCAINE USE: ICD-10-CM

## 2024-04-30 DIAGNOSIS — D69.6 THROMBOCYTOPENIA (HCC): ICD-10-CM

## 2024-04-30 DIAGNOSIS — E87.6 HYPOKALEMIA: Primary | ICD-10-CM

## 2024-04-30 DIAGNOSIS — E83.42 HYPOMAGNESEMIA: ICD-10-CM

## 2024-04-30 DIAGNOSIS — K85.90 ACUTE PANCREATITIS, UNSPECIFIED COMPLICATION STATUS, UNSPECIFIED PANCREATITIS TYPE: ICD-10-CM

## 2024-04-30 DIAGNOSIS — K21.9 GASTROESOPHAGEAL REFLUX DISEASE WITHOUT ESOPHAGITIS: ICD-10-CM

## 2024-04-30 LAB
ALBUMIN SERPL-MCNC: 2.6 G/DL (ref 3.4–5)
ALBUMIN/GLOB SERPL: 1.2 {RATIO} (ref 1.1–2.2)
ALP SERPL-CCNC: 181 U/L (ref 40–129)
ALT SERPL-CCNC: 58 U/L (ref 10–40)
AMPHETAMINES UR QL SCN>1000 NG/ML: ABNORMAL
ANION GAP SERPL CALCULATED.3IONS-SCNC: 13 MMOL/L (ref 3–16)
ANION GAP SERPL CALCULATED.3IONS-SCNC: 6 MMOL/L (ref 3–16)
ANION GAP SERPL CALCULATED.3IONS-SCNC: 8 MMOL/L (ref 3–16)
ANION GAP SERPL CALCULATED.3IONS-SCNC: 8 MMOL/L (ref 3–16)
AST SERPL-CCNC: 84 U/L (ref 15–37)
BARBITURATES UR QL SCN>200 NG/ML: ABNORMAL
BASOPHILS # BLD: 0 K/UL (ref 0–0.2)
BASOPHILS NFR BLD: 0.6 %
BENZODIAZ UR QL SCN>200 NG/ML: ABNORMAL
BILIRUB SERPL-MCNC: 0.7 MG/DL (ref 0–1)
BILIRUB UR QL STRIP.AUTO: NEGATIVE
BUN SERPL-MCNC: 10 MG/DL (ref 7–20)
BUN SERPL-MCNC: 6 MG/DL (ref 7–20)
BUN SERPL-MCNC: 7 MG/DL (ref 7–20)
BUN SERPL-MCNC: 7 MG/DL (ref 7–20)
CALCIUM SERPL-MCNC: 7.2 MG/DL (ref 8.3–10.6)
CALCIUM SERPL-MCNC: 7.6 MG/DL (ref 8.3–10.6)
CALCIUM SERPL-MCNC: 8.1 MG/DL (ref 8.3–10.6)
CALCIUM SERPL-MCNC: 8.6 MG/DL (ref 8.3–10.6)
CANNABINOIDS UR QL SCN>50 NG/ML: ABNORMAL
CHLORIDE SERPL-SCNC: 100 MMOL/L (ref 99–110)
CHLORIDE SERPL-SCNC: 101 MMOL/L (ref 99–110)
CHLORIDE SERPL-SCNC: 92 MMOL/L (ref 99–110)
CHLORIDE SERPL-SCNC: 94 MMOL/L (ref 99–110)
CLARITY UR: CLEAR
CO2 SERPL-SCNC: 30 MMOL/L (ref 21–32)
CO2 SERPL-SCNC: 31 MMOL/L (ref 21–32)
CO2 SERPL-SCNC: 33 MMOL/L (ref 21–32)
CO2 SERPL-SCNC: 37 MMOL/L (ref 21–32)
COCAINE UR QL SCN: POSITIVE
COLOR UR: YELLOW
CREAT SERPL-MCNC: 0.6 MG/DL (ref 0.6–1.1)
DEPRECATED RDW RBC AUTO: 21.5 % (ref 12.4–15.4)
DRUG SCREEN COMMENT UR-IMP: ABNORMAL
EKG ATRIAL RATE: 63 BPM
EKG DIAGNOSIS: NORMAL
EKG P AXIS: 53 DEGREES
EKG P-R INTERVAL: 140 MS
EKG Q-T INTERVAL: 582 MS
EKG QRS DURATION: 86 MS
EKG QTC CALCULATION (BAZETT): 595 MS
EKG R AXIS: 64 DEGREES
EKG T AXIS: 68 DEGREES
EKG VENTRICULAR RATE: 63 BPM
EOSINOPHIL # BLD: 0 K/UL (ref 0–0.6)
EOSINOPHIL NFR BLD: 0.7 %
ETHANOLAMINE SERPL-MCNC: NORMAL MG/DL (ref 0–0.08)
FENTANYL SCREEN, URINE: ABNORMAL
GFR SERPLBLD CREATININE-BSD FMLA CKD-EPI: >90 ML/MIN/{1.73_M2}
GLUCOSE SERPL-MCNC: 114 MG/DL (ref 70–99)
GLUCOSE SERPL-MCNC: 125 MG/DL (ref 70–99)
GLUCOSE SERPL-MCNC: 138 MG/DL (ref 70–99)
GLUCOSE SERPL-MCNC: 55 MG/DL (ref 70–99)
GLUCOSE UR STRIP.AUTO-MCNC: NEGATIVE MG/DL
HCG SERPL QL: NEGATIVE
HCG UR QL: NEGATIVE
HCT VFR BLD AUTO: 23.8 % (ref 36–48)
HEMOCCULT STL QL: NORMAL
HGB BLD-MCNC: 7.6 G/DL (ref 12–16)
HGB UR QL STRIP.AUTO: NEGATIVE
INR PPP: 1.4 (ref 0.85–1.15)
KETONES UR STRIP.AUTO-MCNC: NEGATIVE MG/DL
LACTATE BLDV-SCNC: 1.9 MMOL/L (ref 0.4–1.9)
LEUKOCYTE ESTERASE UR QL STRIP.AUTO: NEGATIVE
LIPASE SERPL-CCNC: 18 U/L (ref 13–60)
LYMPHOCYTES # BLD: 1.3 K/UL (ref 1–5.1)
LYMPHOCYTES NFR BLD: 36.4 %
MAGNESIUM SERPL-MCNC: 1.5 MG/DL (ref 1.8–2.4)
MAGNESIUM SERPL-MCNC: 1.5 MG/DL (ref 1.8–2.4)
MAGNESIUM SERPL-MCNC: 2 MG/DL (ref 1.8–2.4)
MAGNESIUM SERPL-MCNC: 2.1 MG/DL (ref 1.8–2.4)
MCH RBC QN AUTO: 28.7 PG (ref 26–34)
MCHC RBC AUTO-ENTMCNC: 32 G/DL (ref 31–36)
MCV RBC AUTO: 89.5 FL (ref 80–100)
METHADONE UR QL SCN>300 NG/ML: ABNORMAL
MONOCYTES # BLD: 0.4 K/UL (ref 0–1.3)
MONOCYTES NFR BLD: 10.4 %
NEUTROPHILS # BLD: 1.9 K/UL (ref 1.7–7.7)
NEUTROPHILS NFR BLD: 51.9 %
NITRITE UR QL STRIP.AUTO: NEGATIVE
NT-PROBNP SERPL-MCNC: 65 PG/ML (ref 0–124)
OPIATES UR QL SCN>300 NG/ML: ABNORMAL
OXYCODONE UR QL SCN: ABNORMAL
PCP UR QL SCN>25 NG/ML: ABNORMAL
PH UR STRIP.AUTO: 6.5 [PH] (ref 5–8)
PH UR STRIP: 6 [PH]
PLATELET # BLD AUTO: 79 K/UL (ref 135–450)
PMV BLD AUTO: 7.3 FL (ref 5–10.5)
POTASSIUM SERPL-SCNC: 2.7 MMOL/L (ref 3.5–5.1)
POTASSIUM SERPL-SCNC: 2.9 MMOL/L (ref 3.5–5.1)
POTASSIUM SERPL-SCNC: 3.3 MMOL/L (ref 3.5–5.1)
POTASSIUM SERPL-SCNC: 3.3 MMOL/L (ref 3.5–5.1)
PROT SERPL-MCNC: 4.7 G/DL (ref 6.4–8.2)
PROT UR STRIP.AUTO-MCNC: NEGATIVE MG/DL
PROTHROMBIN TIME: 17.3 SEC (ref 11.9–14.9)
RBC # BLD AUTO: 2.65 M/UL (ref 4–5.2)
SODIUM SERPL-SCNC: 138 MMOL/L (ref 136–145)
SODIUM SERPL-SCNC: 139 MMOL/L (ref 136–145)
SP GR UR STRIP.AUTO: 1.05 (ref 1–1.03)
T4 FREE SERPL-MCNC: 1.1 NG/DL (ref 0.9–1.8)
TROPONIN, HIGH SENSITIVITY: 19 NG/L (ref 0–14)
UA COMPLETE W REFLEX CULTURE PNL UR: ABNORMAL
UA DIPSTICK W REFLEX MICRO PNL UR: ABNORMAL
URN SPEC COLLECT METH UR: ABNORMAL
UROBILINOGEN UR STRIP-ACNC: 2 E.U./DL
WBC # BLD AUTO: 3.6 K/UL (ref 4–11)

## 2024-04-30 PROCEDURE — 83690 ASSAY OF LIPASE: CPT

## 2024-04-30 PROCEDURE — 99285 EMERGENCY DEPT VISIT HI MDM: CPT

## 2024-04-30 PROCEDURE — 96366 THER/PROPH/DIAG IV INF ADDON: CPT

## 2024-04-30 PROCEDURE — 70450 CT HEAD/BRAIN W/O DYE: CPT

## 2024-04-30 PROCEDURE — 96375 TX/PRO/DX INJ NEW DRUG ADDON: CPT

## 2024-04-30 PROCEDURE — 6370000000 HC RX 637 (ALT 250 FOR IP): Performed by: INTERNAL MEDICINE

## 2024-04-30 PROCEDURE — 81003 URINALYSIS AUTO W/O SCOPE: CPT

## 2024-04-30 PROCEDURE — 83605 ASSAY OF LACTIC ACID: CPT

## 2024-04-30 PROCEDURE — 83735 ASSAY OF MAGNESIUM: CPT

## 2024-04-30 PROCEDURE — 85025 COMPLETE CBC W/AUTO DIFF WBC: CPT

## 2024-04-30 PROCEDURE — 84703 CHORIONIC GONADOTROPIN ASSAY: CPT

## 2024-04-30 PROCEDURE — 83880 ASSAY OF NATRIURETIC PEPTIDE: CPT

## 2024-04-30 PROCEDURE — 96365 THER/PROPH/DIAG IV INF INIT: CPT

## 2024-04-30 PROCEDURE — 80053 COMPREHEN METABOLIC PANEL: CPT

## 2024-04-30 PROCEDURE — 93010 ELECTROCARDIOGRAM REPORT: CPT | Performed by: INTERNAL MEDICINE

## 2024-04-30 PROCEDURE — 6360000002 HC RX W HCPCS: Performed by: REGISTERED NURSE

## 2024-04-30 PROCEDURE — 71045 X-RAY EXAM CHEST 1 VIEW: CPT

## 2024-04-30 PROCEDURE — 84484 ASSAY OF TROPONIN QUANT: CPT

## 2024-04-30 PROCEDURE — 6360000002 HC RX W HCPCS: Performed by: EMERGENCY MEDICINE

## 2024-04-30 PROCEDURE — 87040 BLOOD CULTURE FOR BACTERIA: CPT

## 2024-04-30 PROCEDURE — 82270 OCCULT BLOOD FECES: CPT

## 2024-04-30 PROCEDURE — 85610 PROTHROMBIN TIME: CPT

## 2024-04-30 PROCEDURE — 93005 ELECTROCARDIOGRAM TRACING: CPT | Performed by: EMERGENCY MEDICINE

## 2024-04-30 PROCEDURE — 72125 CT NECK SPINE W/O DYE: CPT

## 2024-04-30 PROCEDURE — 2580000003 HC RX 258: Performed by: EMERGENCY MEDICINE

## 2024-04-30 PROCEDURE — C9113 INJ PANTOPRAZOLE SODIUM, VIA: HCPCS | Performed by: EMERGENCY MEDICINE

## 2024-04-30 PROCEDURE — 2580000003 HC RX 258: Performed by: INTERNAL MEDICINE

## 2024-04-30 PROCEDURE — 74177 CT ABD & PELVIS W/CONTRAST: CPT

## 2024-04-30 PROCEDURE — 80307 DRUG TEST PRSMV CHEM ANLYZR: CPT

## 2024-04-30 PROCEDURE — 96368 THER/DIAG CONCURRENT INF: CPT

## 2024-04-30 PROCEDURE — 2060000000 HC ICU INTERMEDIATE R&B

## 2024-04-30 PROCEDURE — 2500000003 HC RX 250 WO HCPCS: Performed by: REGISTERED NURSE

## 2024-04-30 PROCEDURE — A4216 STERILE WATER/SALINE, 10 ML: HCPCS | Performed by: EMERGENCY MEDICINE

## 2024-04-30 PROCEDURE — 6360000004 HC RX CONTRAST MEDICATION: Performed by: EMERGENCY MEDICINE

## 2024-04-30 PROCEDURE — 36415 COLL VENOUS BLD VENIPUNCTURE: CPT

## 2024-04-30 PROCEDURE — 6360000002 HC RX W HCPCS: Performed by: INTERNAL MEDICINE

## 2024-04-30 PROCEDURE — 82077 ASSAY SPEC XCP UR&BREATH IA: CPT

## 2024-04-30 RX ORDER — ALBUTEROL SULFATE 90 UG/1
2 AEROSOL, METERED RESPIRATORY (INHALATION) 4 TIMES DAILY PRN
Status: DISCONTINUED | OUTPATIENT
Start: 2024-04-30 | End: 2024-05-04 | Stop reason: HOSPADM

## 2024-04-30 RX ORDER — ACETAMINOPHEN 325 MG/1
650 TABLET ORAL EVERY 6 HOURS PRN
Status: DISCONTINUED | OUTPATIENT
Start: 2024-04-30 | End: 2024-05-04 | Stop reason: HOSPADM

## 2024-04-30 RX ORDER — SODIUM CHLORIDE, SODIUM LACTATE, POTASSIUM CHLORIDE, CALCIUM CHLORIDE 600; 310; 30; 20 MG/100ML; MG/100ML; MG/100ML; MG/100ML
INJECTION, SOLUTION INTRAVENOUS CONTINUOUS
Status: DISCONTINUED | OUTPATIENT
Start: 2024-04-30 | End: 2024-05-02

## 2024-04-30 RX ORDER — LORAZEPAM 2 MG/ML
3 INJECTION INTRAMUSCULAR
Status: DISCONTINUED | OUTPATIENT
Start: 2024-04-30 | End: 2024-05-03

## 2024-04-30 RX ORDER — LANOLIN ALCOHOL/MO/W.PET/CERES
400 CREAM (GRAM) TOPICAL DAILY
Status: DISCONTINUED | OUTPATIENT
Start: 2024-05-01 | End: 2024-05-04 | Stop reason: HOSPADM

## 2024-04-30 RX ORDER — SODIUM CHLORIDE 9 MG/ML
INJECTION, SOLUTION INTRAVENOUS PRN
Status: DISCONTINUED | OUTPATIENT
Start: 2024-04-30 | End: 2024-05-04 | Stop reason: HOSPADM

## 2024-04-30 RX ORDER — LORAZEPAM 1 MG/1
4 TABLET ORAL
Status: DISCONTINUED | OUTPATIENT
Start: 2024-04-30 | End: 2024-05-03

## 2024-04-30 RX ORDER — MAGNESIUM SULFATE IN WATER 40 MG/ML
2000 INJECTION, SOLUTION INTRAVENOUS ONCE
Status: COMPLETED | OUTPATIENT
Start: 2024-04-30 | End: 2024-04-30

## 2024-04-30 RX ORDER — LORAZEPAM 2 MG/ML
1 INJECTION INTRAMUSCULAR
Status: DISCONTINUED | OUTPATIENT
Start: 2024-04-30 | End: 2024-05-03

## 2024-04-30 RX ORDER — LEVOTHYROXINE SODIUM 0.03 MG/1
25 TABLET ORAL DAILY
Status: DISCONTINUED | OUTPATIENT
Start: 2024-05-01 | End: 2024-05-04 | Stop reason: HOSPADM

## 2024-04-30 RX ORDER — KETOROLAC TROMETHAMINE 30 MG/ML
30 INJECTION, SOLUTION INTRAMUSCULAR; INTRAVENOUS ONCE
Status: COMPLETED | OUTPATIENT
Start: 2024-04-30 | End: 2024-04-30

## 2024-04-30 RX ORDER — 0.9 % SODIUM CHLORIDE 0.9 %
1000 INTRAVENOUS SOLUTION INTRAVENOUS ONCE
Status: COMPLETED | OUTPATIENT
Start: 2024-04-30 | End: 2024-04-30

## 2024-04-30 RX ORDER — GAUZE BANDAGE 2" X 2"
100 BANDAGE TOPICAL DAILY
Status: DISCONTINUED | OUTPATIENT
Start: 2024-05-01 | End: 2024-05-04 | Stop reason: HOSPADM

## 2024-04-30 RX ORDER — POTASSIUM CHLORIDE 7.45 MG/ML
10 INJECTION INTRAVENOUS
Status: COMPLETED | OUTPATIENT
Start: 2024-04-30 | End: 2024-04-30

## 2024-04-30 RX ORDER — ACETAMINOPHEN 650 MG/1
650 SUPPOSITORY RECTAL EVERY 6 HOURS PRN
Status: DISCONTINUED | OUTPATIENT
Start: 2024-04-30 | End: 2024-05-04 | Stop reason: HOSPADM

## 2024-04-30 RX ORDER — SODIUM CHLORIDE 0.9 % (FLUSH) 0.9 %
5-40 SYRINGE (ML) INJECTION PRN
Status: DISCONTINUED | OUTPATIENT
Start: 2024-04-30 | End: 2024-05-04 | Stop reason: HOSPADM

## 2024-04-30 RX ORDER — ASPIRIN 81 MG/1
81 TABLET, CHEWABLE ORAL DAILY
Status: DISCONTINUED | OUTPATIENT
Start: 2024-05-01 | End: 2024-05-04 | Stop reason: HOSPADM

## 2024-04-30 RX ORDER — POTASSIUM CHLORIDE 7.45 MG/ML
10 INJECTION INTRAVENOUS PRN
Status: DISCONTINUED | OUTPATIENT
Start: 2024-04-30 | End: 2024-05-04 | Stop reason: HOSPADM

## 2024-04-30 RX ORDER — VALACYCLOVIR HYDROCHLORIDE 500 MG/1
1000 TABLET, FILM COATED ORAL DAILY
Status: DISCONTINUED | OUTPATIENT
Start: 2024-05-01 | End: 2024-05-04 | Stop reason: HOSPADM

## 2024-04-30 RX ORDER — MAGNESIUM SULFATE 1 G/100ML
1000 INJECTION INTRAVENOUS ONCE
Status: DISCONTINUED | OUTPATIENT
Start: 2024-04-30 | End: 2024-04-30

## 2024-04-30 RX ORDER — MAGNESIUM SULFATE IN WATER 40 MG/ML
2000 INJECTION, SOLUTION INTRAVENOUS PRN
Status: DISCONTINUED | OUTPATIENT
Start: 2024-04-30 | End: 2024-05-04 | Stop reason: HOSPADM

## 2024-04-30 RX ORDER — MAGNESIUM SULFATE 1 G/100ML
1000 INJECTION INTRAVENOUS ONCE
Status: COMPLETED | OUTPATIENT
Start: 2024-04-30 | End: 2024-04-30

## 2024-04-30 RX ORDER — ENOXAPARIN SODIUM 100 MG/ML
40 INJECTION SUBCUTANEOUS DAILY
Status: DISCONTINUED | OUTPATIENT
Start: 2024-05-01 | End: 2024-05-04 | Stop reason: HOSPADM

## 2024-04-30 RX ORDER — BUPRENORPHINE HYDROCHLORIDE AND NALOXONE HYDROCHLORIDE DIHYDRATE 2; .5 MG/1; MG/1
1 TABLET SUBLINGUAL DAILY
Status: DISCONTINUED | OUTPATIENT
Start: 2024-05-01 | End: 2024-05-04 | Stop reason: HOSPADM

## 2024-04-30 RX ORDER — SODIUM CHLORIDE 0.9 % (FLUSH) 0.9 %
5-40 SYRINGE (ML) INJECTION EVERY 12 HOURS SCHEDULED
Status: DISCONTINUED | OUTPATIENT
Start: 2024-04-30 | End: 2024-05-04 | Stop reason: HOSPADM

## 2024-04-30 RX ORDER — SODIUM CHLORIDE 9 MG/ML
INJECTION, SOLUTION INTRAVENOUS CONTINUOUS
Status: DISCONTINUED | OUTPATIENT
Start: 2024-04-30 | End: 2024-04-30

## 2024-04-30 RX ORDER — LORAZEPAM 2 MG/ML
4 INJECTION INTRAMUSCULAR
Status: DISCONTINUED | OUTPATIENT
Start: 2024-04-30 | End: 2024-05-03

## 2024-04-30 RX ORDER — PANTOPRAZOLE SODIUM 40 MG/1
40 TABLET, DELAYED RELEASE ORAL
Status: DISCONTINUED | OUTPATIENT
Start: 2024-05-01 | End: 2024-05-04 | Stop reason: HOSPADM

## 2024-04-30 RX ORDER — MULTIVITAMIN WITH IRON
1 TABLET ORAL DAILY
Status: DISCONTINUED | OUTPATIENT
Start: 2024-05-01 | End: 2024-05-04 | Stop reason: HOSPADM

## 2024-04-30 RX ORDER — POLYETHYLENE GLYCOL 3350 17 G/17G
17 POWDER, FOR SOLUTION ORAL DAILY PRN
Status: DISCONTINUED | OUTPATIENT
Start: 2024-04-30 | End: 2024-05-04 | Stop reason: HOSPADM

## 2024-04-30 RX ORDER — POTASSIUM CHLORIDE 20 MEQ/1
40 TABLET, EXTENDED RELEASE ORAL PRN
Status: DISCONTINUED | OUTPATIENT
Start: 2024-04-30 | End: 2024-05-04 | Stop reason: HOSPADM

## 2024-04-30 RX ORDER — LORAZEPAM 1 MG/1
1 TABLET ORAL
Status: DISCONTINUED | OUTPATIENT
Start: 2024-04-30 | End: 2024-05-03

## 2024-04-30 RX ORDER — LORAZEPAM 1 MG/1
3 TABLET ORAL
Status: DISCONTINUED | OUTPATIENT
Start: 2024-04-30 | End: 2024-05-03

## 2024-04-30 RX ORDER — LORAZEPAM 1 MG/1
2 TABLET ORAL
Status: DISCONTINUED | OUTPATIENT
Start: 2024-04-30 | End: 2024-05-03

## 2024-04-30 RX ORDER — LEVOTHYROXINE SODIUM 0.03 MG/1
25 TABLET ORAL DAILY
Qty: 30 TABLET | Refills: 1 | Status: SHIPPED | OUTPATIENT
Start: 2024-04-30

## 2024-04-30 RX ORDER — LORAZEPAM 2 MG/ML
2 INJECTION INTRAMUSCULAR
Status: DISCONTINUED | OUTPATIENT
Start: 2024-04-30 | End: 2024-05-03

## 2024-04-30 RX ORDER — AMIODARONE HYDROCHLORIDE 200 MG/1
200 TABLET ORAL DAILY
Status: DISCONTINUED | OUTPATIENT
Start: 2024-05-01 | End: 2024-05-04 | Stop reason: HOSPADM

## 2024-04-30 RX ADMIN — LORAZEPAM 1 MG: 1 TABLET ORAL at 22:40

## 2024-04-30 RX ADMIN — IOPAMIDOL 75 ML: 755 INJECTION, SOLUTION INTRAVENOUS at 16:11

## 2024-04-30 RX ADMIN — POTASSIUM CHLORIDE 10 MEQ: 7.46 INJECTION, SOLUTION INTRAVENOUS at 15:39

## 2024-04-30 RX ADMIN — MAGNESIUM SULFATE HEPTAHYDRATE 2000 MG: 40 INJECTION, SOLUTION INTRAVENOUS at 14:01

## 2024-04-30 RX ADMIN — SODIUM CHLORIDE, POTASSIUM CHLORIDE, SODIUM LACTATE AND CALCIUM CHLORIDE: 600; 310; 30; 20 INJECTION, SOLUTION INTRAVENOUS at 23:56

## 2024-04-30 RX ADMIN — SODIUM CHLORIDE 1000 ML: 9 INJECTION, SOLUTION INTRAVENOUS at 17:03

## 2024-04-30 RX ADMIN — POTASSIUM CHLORIDE 10 MEQ: 7.46 INJECTION, SOLUTION INTRAVENOUS at 17:57

## 2024-04-30 RX ADMIN — POTASSIUM CHLORIDE 10 MEQ: 7.46 INJECTION, SOLUTION INTRAVENOUS at 16:00

## 2024-04-30 RX ADMIN — POTASSIUM CHLORIDE 10 MEQ: 7.46 INJECTION, SOLUTION INTRAVENOUS at 14:08

## 2024-04-30 RX ADMIN — SODIUM CHLORIDE: 9 INJECTION, SOLUTION INTRAVENOUS at 14:12

## 2024-04-30 RX ADMIN — SODIUM CHLORIDE, PRESERVATIVE FREE 10 ML: 5 INJECTION INTRAVENOUS at 22:41

## 2024-04-30 RX ADMIN — SODIUM CHLORIDE 1000 ML: 9 INJECTION, SOLUTION INTRAVENOUS at 13:20

## 2024-04-30 RX ADMIN — PANTOPRAZOLE SODIUM 40 MG: 40 INJECTION, POWDER, FOR SOLUTION INTRAVENOUS at 13:59

## 2024-04-30 RX ADMIN — PIPERACILLIN AND TAZOBACTAM 4500 MG: 4; .5 INJECTION, POWDER, LYOPHILIZED, FOR SOLUTION INTRAVENOUS at 18:57

## 2024-04-30 RX ADMIN — POTASSIUM CHLORIDE 40 MEQ: 1500 TABLET, EXTENDED RELEASE ORAL at 23:58

## 2024-04-30 RX ADMIN — SODIUM CHLORIDE 1000 ML: 9 INJECTION, SOLUTION INTRAVENOUS at 14:07

## 2024-04-30 RX ADMIN — LORAZEPAM 1 MG: 2 INJECTION INTRAMUSCULAR; INTRAVENOUS at 23:58

## 2024-04-30 RX ADMIN — MAGNESIUM SULFATE HEPTAHYDRATE 1000 MG: 1 INJECTION, SOLUTION INTRAVENOUS at 16:47

## 2024-04-30 RX ADMIN — ACETAMINOPHEN 650 MG: 325 TABLET ORAL at 22:40

## 2024-04-30 RX ADMIN — KETOROLAC TROMETHAMINE 30 MG: 30 INJECTION, SOLUTION INTRAMUSCULAR at 23:58

## 2024-04-30 RX ADMIN — CALCIUM GLUCONATE 1000 MG: 20 INJECTION, SOLUTION INTRAVENOUS at 23:56

## 2024-04-30 ASSESSMENT — LIFESTYLE VARIABLES
HOW MANY STANDARD DRINKS CONTAINING ALCOHOL DO YOU HAVE ON A TYPICAL DAY: 3 OR 4
HOW OFTEN DO YOU HAVE A DRINK CONTAINING ALCOHOL: 4 OR MORE TIMES A WEEK
HOW MANY STANDARD DRINKS CONTAINING ALCOHOL DO YOU HAVE ON A TYPICAL DAY: 3 OR 4
HOW OFTEN DO YOU HAVE A DRINK CONTAINING ALCOHOL: 4 OR MORE TIMES A WEEK

## 2024-04-30 ASSESSMENT — PAIN DESCRIPTION - ORIENTATION
ORIENTATION: MID;RIGHT
ORIENTATION: RIGHT;MID

## 2024-04-30 ASSESSMENT — PAIN DESCRIPTION - DESCRIPTORS
DESCRIPTORS: STABBING
DESCRIPTORS: ACHING;STABBING

## 2024-04-30 ASSESSMENT — PAIN SCALES - GENERAL
PAINLEVEL_OUTOF10: 0
PAINLEVEL_OUTOF10: 9
PAINLEVEL_OUTOF10: 9

## 2024-04-30 ASSESSMENT — PAIN DESCRIPTION - LOCATION
LOCATION: ABDOMEN
LOCATION: ABDOMEN

## 2024-04-30 ASSESSMENT — PAIN - FUNCTIONAL ASSESSMENT: PAIN_FUNCTIONAL_ASSESSMENT: 0-10

## 2024-04-30 NOTE — ED NOTES
Patient tolerated crackers and water; requested a turkey sandwich and starry to drink. Rn provided

## 2024-04-30 NOTE — ED NOTES
Handoff report given to WILFRED Zhang; all questions and concerns answered; receiving Rn acknowledged and had no further questions at this time; receiving RN to assume all care at this time.

## 2024-04-30 NOTE — ED NOTES
EDMD informed RN following RN informing of failed attempt of BC to go ahead and start ABX w/o BC being obtained first. RN acknowledged and will give ABX

## 2024-04-30 NOTE — ED NOTES
ELVIE peralta w/ patient starting w/ water for PO challenge and then advancing her diet as needed. RN acknowledged and provided patient w/ water and saltine crackers.

## 2024-04-30 NOTE — FLOWSHEET NOTE
04/30/24 1700   Vital Signs   Pulse 66   Respirations 10   BP (!) 95/54  (EDMD request to start new bolus of normal saline and watch Bp)   MAP (Calculated) 68   MAP (mmHg) 66   Oxygen Therapy   SpO2 94 %

## 2024-04-30 NOTE — H&P
Hospital Medicine History & Physical      PCP: Elly Mohan APRN    Date of Admission: 2024    Date of Service: Pt seen/examined on 2024 and Admitted to Inpatient with expected LOS greater than two midnights due to medical therapy.   Chief Complaint: Weakness abnormal labs      History Of Present Illness:      48 y.o. female who presented to [facility] with Generalized weakness, abnormal labs apparently patient went to see a new PCP yesterday and routine labs were done came to ED feeling weak for the last 1 to 2 weeks poor appetite no vomiting though, but positive for nausea, no diarrhea stated that she had some episodes of lightheadedness did not lose consciousness but she was about to, having some abdominal discomfort then she stated he is having epigastric pain up to 8 out of 10 intensity nonradiating associated again with nausea, denies seizures no palpitation no vertigo she reported to drink water on a daily basis in ED found to be having hypomagnesemia hypokalemia prolonged QT discussed with EDMD agree with plan to admit for further management and treatment    Past Medical History:          Diagnosis Date    Asthma     Depression     Drug dependence (HCC)     Drug overdose, multiple drugs 2015    Eczema of hand 2012    ETOH abuse 2011    Hepatitis C 2015    Heroin withdrawal (HCC)     Hypothyroidism     MRSA infection 2014    Suicidal ideation        Past Surgical History:          Procedure Laterality Date    ABSCESS DRAINAGE Left 2017    I&D Left forearm deep abscess, VAC application    BREAST SURGERY       SECTION      TUBAL LIGATION      UPPER GASTROINTESTINAL ENDOSCOPY N/A 2024    EGD BIOPSY performed by Raúl Tello MD at [facility] ENDOSCOPY       Medications Prior to Admission:      Prior to Admission medications    Medication Sig Start Date End Date Taking? Authorizing Provider   levothyroxine (SYNTHROID) 25 MCG tablet Take 1  0.6 0.7   ALKPHOS 222* 181*     Recent Labs     04/30/24  1300   INR 1.40*     Recent Labs     04/30/24  1300   TROPHS 19*       Urinalysis:      Lab Results   Component Value Date/Time    NITRU Negative 03/22/2024 10:35 PM    WBCUA 454 01/31/2024 06:29 AM    BACTERIA Rare 01/31/2024 06:29 AM    RBCUA 6 01/31/2024 06:29 AM    BLOODU Negative 03/22/2024 10:35 PM    GLUCOSEU Negative 03/22/2024 10:35 PM    GLUCOSEU NEGATIVE 05/25/2010 03:08 PM       Radiology:       EKG:  I have reviewed the EKG with the following interpretation: Prolonged QT    CT ABDOMEN PELVIS W IV CONTRAST Additional Contrast? None   Final Result   1. Possible acute pancreatitis; correlate with lipase.   2. Cirrhosis with stigmata of portal hypertension including splenomegaly and   mild ascites.   3. Scattered small and large bowel wall thickening could be due to advanced   liver disease and/or enterocolitis.         CT HEAD WO CONTRAST   Final Result   No acute intracranial abnormality.         CT CERVICAL SPINE WO CONTRAST   Preliminary Result   No acute abnormality of the cervical spine.         XR CHEST 1 VIEW   Final Result   No acute cardiopulmonary process.             Consults:    IP CONSULT TO SOCIAL WORK  IP CONSULT TO CARDIOLOGY    ASSESSMENT:    Active Hospital Problems    Diagnosis Date Noted    Near syncope [R55] 04/30/2024         PLAN:    Alcohol abuse and alcohol withdrawal, admit to the hospital, [facility] protocol thiamine multivitamin Ativan telemetry monitoring.  Counseled for quitting alcohol   History of V. tach with prolonged QT, patient had 2 presyncopal episode, on presentation magnesium level low potassium low replacement cardiology consulted telemetry monitoring  Hypomagnesemia being replaced  Hypokalemia being replaced  Possible acute alcohol induced pancreatitis likely mild IV fluid with lactate changes, clear liquid consider discontinuing fluid in a.m.  Anemia appears chronic no signs of bleeding at this time likely

## 2024-04-30 NOTE — ED NOTES
RN asked patient to provide UA patient reports she doesn't have to urinate at this time. Rn discussed placing a external cath incase she has to urinate to collect sample d/t fluctuation in BP and MD not really wanting her to get up and moving. Patient okay w/ external cath but reports to RN that she does not want to be straight cath for UA. Rn acknowledged and informed EDMD of patient response

## 2024-04-30 NOTE — ED TRIAGE NOTES
Patient arrived to the ED ambulatory from home via EMS w/ complaints of abnormal lab.     Patient/EMS reports states PCP office called her around 0100 in the morning reporting to her that her K+ levels were and low and needed to come to the ED. Patient reports she didn't receive the message until just a bit ago because PCP called her mom; patient reports she is feeling fatigue and has bilateral leg weakness    Patient A&O x 4, VSS w/n normal limits w/ exception of BP,    Patient also reports being a daily drinker and of 3 to 4 drinks in a time. Last drink was 0001 04/30/2024.   Reports still taking suboxone but reports may have cocaine in her system but hasn't used in a couple days.

## 2024-04-30 NOTE — TELEPHONE ENCOUNTER
I was called by lab for low K of 2.65    I Called patient on her cell and her home number. Listed in chart.   [phone], msg left on vm I called twice. Msg left to go to ER for abnormal lab result of low K   [phone] could not leave msg     I called mother jagruti hawley and discussed with her about the above  She gave me [phone] to try as well.   I called that number a gentleman answered and indicated she was not there. I told him if he see's her or hears from her have her go to the ER for abnormal blood test

## 2024-04-30 NOTE — ED PROVIDER NOTES
[facility] EMERGENCY DEPARTMENT  EMERGENCY DEPARTMENT ENCOUNTER      Pt Name: Lali Cazares  MRN: [id number]  Birthdate 1975  Date of evaluation: 4/30/2024  Provider: MARIE PRIETO DO    CHIEF COMPLAINT       Chief Complaint   Patient presents with    Abnormal Lab     PCP office called her around 0100 in the morning reporting to her that her K+ levels were and low and needed to come to the ED. Patient reports she didn't receive the message until just a bit ago because PCP called her mom; patient reports she is feeling fatigue and has bilateral leg weakness         HISTORY OF PRESENT ILLNESS   (Location/Symptom, Timing/Onset, Context/Setting, Quality, Duration, Modifying Factors, Severity)  Note limiting factors.   Lali Cazares is a 48 y.o. female who presents to the emergency department with a complaint of abnormal blood test.  The patient states that she went to see a new primary care physician yesterday for the first time and had routine laboratory studies drawn.  She was called today and told that her potassium was low and she needed to come to the emergency department.    Patient states that this has happened in the past.  She states that she really has not been feeling well for the last 1 to 2 weeks.  She has had loss of appetite and has had some sharp epigastric intermittent pain and some pain with eating in the subxiphoid area.  She does complain of some mild discomfort in that area at the time of exam.  She reports nausea but denies any vomiting.  She denies any diarrhea.  She denies any melena or hematochezia.  She denies any dysuria hematuria frequency or urgency.  No fever or chills.    She has been having some episodes of lightheadedness and over the weekend, 3 days ago she had a couple of falls when she stood up too quickly and became lightheaded.  She reports that she did not lose consciousness.  No actual syncope.  No seizures.  She did hit her head.  She  (*)     AST 84 (*)     All other components within normal limits    Narrative:     CALL  Hall  SKERK tel. [phone],  Chemistry results called to and read back by Cheli Flores RN, 04/30/2024  13:45, by MITTR   TROPONIN - Abnormal; Notable for the following components:    Troponin, High Sensitivity 19 (*)     All other components within normal limits   PROTIME-INR - Abnormal; Notable for the following components:    Protime 17.3 (*)     INR 1.40 (*)     All other components within normal limits   MAGNESIUM - Abnormal; Notable for the following components:    Magnesium 1.50 (*)     All other components within normal limits    Narrative:     CALL  Hall  SKERK tel. [phone],  Chemistry results called to and read back by Cheli Flores RN, 04/30/2024  13:45, by MITTR   MAGNESIUM - Abnormal; Notable for the following components:    Magnesium 1.50 (*)     All other components within normal limits   CULTURE, BLOOD 1   CULTURE, BLOOD 2   BLOOD OCCULT STOOL DIAGNOSTIC    Narrative:     ORDER#: [id number]                          ORDERED BY: MARIE PRIETO  SOURCE: Stool                              COLLECTED:  04/30/24 13:41  ANTIBIOTICS AT REYES.:                      RECEIVED :  04/30/24 13:48   BRAIN NATRIURETIC PEPTIDE   ETHANOL   HCG, SERUM, QUALITATIVE   URINALYSIS WITH REFLEX TO CULTURE   PREGNANCY, URINE   URINE DRUG SCREEN   LIPASE   LACTATE, SEPSIS   LACTATE, SEPSIS       All other labs were within normal range or not returned as of this dictation.    EMERGENCY DEPARTMENT COURSE and DIFFERENTIAL DIAGNOSIS/ MEDICAL DECISION MAKING:   Vitals:    Vitals:    04/30/24 1628 04/30/24 1630 04/30/24 1645 04/30/24 1700   BP: 117/81 115/75 104/75 (!) 95/54   Pulse: 70 69 63 66   Resp: 14 15 15 10   Temp:       TempSrc:       SpO2: 99% 98% 94% 94%   Weight:       Height:             MDM      The patient presents to the emergency department the complaint of generally feeling weak, nausea, loss of appetite, episodes of

## 2024-04-30 NOTE — ED NOTES
EDT came dto RN and states that she was unsuccessful x2 and was informed that EDT contacted lab to send someone up for the. EDMD made aware

## 2024-04-30 NOTE — ED NOTES
Patient requesting nausea medications. EDMD made aware and states he does not want to give nausea medication d/t prolonged QT interval. RN acknowledged and informed patient. Patient acknowledged understanding and had no further needs at this time

## 2024-04-30 NOTE — ED NOTES
Pt summoned this RN to the room complaining of her IV hurting. I notified EDMD. Pt K+ modified to 75ml with NS running; per verbal orders EDMD.

## 2024-05-01 LAB
ALBUMIN SERPL-MCNC: 2.8 G/DL (ref 3.4–5)
ALBUMIN/GLOB SERPL: 1.3 {RATIO} (ref 1.1–2.2)
ALP SERPL-CCNC: 204 U/L (ref 40–129)
ALT SERPL-CCNC: 60 U/L (ref 10–40)
ANION GAP SERPL CALCULATED.3IONS-SCNC: 9 MMOL/L (ref 3–16)
AST SERPL-CCNC: 79 U/L (ref 15–37)
BASOPHILS # BLD: 0 K/UL (ref 0–0.2)
BASOPHILS NFR BLD: 0.9 %
BILIRUB SERPL-MCNC: 0.6 MG/DL (ref 0–1)
BUN SERPL-MCNC: 7 MG/DL (ref 7–20)
CALCIUM SERPL-MCNC: 7.9 MG/DL (ref 8.3–10.6)
CHLORIDE SERPL-SCNC: 101 MMOL/L (ref 99–110)
CO2 SERPL-SCNC: 28 MMOL/L (ref 21–32)
CREAT SERPL-MCNC: 0.7 MG/DL (ref 0.6–1.1)
DEPRECATED RDW RBC AUTO: 21.1 % (ref 12.4–15.4)
EOSINOPHIL # BLD: 0.1 K/UL (ref 0–0.6)
EOSINOPHIL NFR BLD: 1.7 %
GFR SERPLBLD CREATININE-BSD FMLA CKD-EPI: >90 ML/MIN/{1.73_M2}
GLUCOSE SERPL-MCNC: 96 MG/DL (ref 70–99)
HCT VFR BLD AUTO: 26.9 % (ref 36–48)
HCV RNA SERPL NAA+PROBE-ACNC: ABNORMAL IU/ML
HCV RNA SERPL NAA+PROBE-LOG IU: 5.16 LOG IU/ML
HCV RNA SERPL QL NAA+PROBE: DETECTED
HGB BLD-MCNC: 8.5 G/DL (ref 12–16)
LYMPHOCYTES # BLD: 1.1 K/UL (ref 1–5.1)
LYMPHOCYTES NFR BLD: 21.1 %
MAGNESIUM SERPL-MCNC: 2 MG/DL (ref 1.8–2.4)
MCH RBC QN AUTO: 28.7 PG (ref 26–34)
MCHC RBC AUTO-ENTMCNC: 31.8 G/DL (ref 31–36)
MCV RBC AUTO: 90.3 FL (ref 80–100)
MONOCYTES # BLD: 0.5 K/UL (ref 0–1.3)
MONOCYTES NFR BLD: 10.3 %
NEUTROPHILS # BLD: 3.4 K/UL (ref 1.7–7.7)
NEUTROPHILS NFR BLD: 66 %
PHOSPHATE SERPL-MCNC: 2.3 MG/DL (ref 2.5–4.9)
PLATELET # BLD AUTO: 82 K/UL (ref 135–450)
PMV BLD AUTO: 7.7 FL (ref 5–10.5)
POTASSIUM SERPL-SCNC: 3.5 MMOL/L (ref 3.5–5.1)
PROT SERPL-MCNC: 4.9 G/DL (ref 6.4–8.2)
RBC # BLD AUTO: 2.98 M/UL (ref 4–5.2)
SODIUM SERPL-SCNC: 138 MMOL/L (ref 136–145)
WBC # BLD AUTO: 5.2 K/UL (ref 4–11)

## 2024-05-01 PROCEDURE — 6370000000 HC RX 637 (ALT 250 FOR IP): Performed by: INTERNAL MEDICINE

## 2024-05-01 PROCEDURE — 83735 ASSAY OF MAGNESIUM: CPT

## 2024-05-01 PROCEDURE — 99222 1ST HOSP IP/OBS MODERATE 55: CPT | Performed by: NURSE PRACTITIONER

## 2024-05-01 PROCEDURE — 6360000002 HC RX W HCPCS: Performed by: INTERNAL MEDICINE

## 2024-05-01 PROCEDURE — 36415 COLL VENOUS BLD VENIPUNCTURE: CPT

## 2024-05-01 PROCEDURE — 84100 ASSAY OF PHOSPHORUS: CPT

## 2024-05-01 PROCEDURE — 2060000000 HC ICU INTERMEDIATE R&B

## 2024-05-01 PROCEDURE — 2580000003 HC RX 258: Performed by: INTERNAL MEDICINE

## 2024-05-01 PROCEDURE — 94760 N-INVAS EAR/PLS OXIMETRY 1: CPT

## 2024-05-01 PROCEDURE — 80053 COMPREHEN METABOLIC PANEL: CPT

## 2024-05-01 PROCEDURE — 85025 COMPLETE CBC W/AUTO DIFF WBC: CPT

## 2024-05-01 RX ADMIN — Medication 400 MG: at 08:47

## 2024-05-01 RX ADMIN — LORAZEPAM 1 MG: 2 INJECTION INTRAMUSCULAR; INTRAVENOUS at 21:54

## 2024-05-01 RX ADMIN — ASPIRIN 81 MG: 81 TABLET, CHEWABLE ORAL at 08:47

## 2024-05-01 RX ADMIN — HYDROMORPHONE HYDROCHLORIDE 0.5 MG: 1 INJECTION, SOLUTION INTRAMUSCULAR; INTRAVENOUS; SUBCUTANEOUS at 05:55

## 2024-05-01 RX ADMIN — Medication 100 MG: at 08:47

## 2024-05-01 RX ADMIN — AMIODARONE HYDROCHLORIDE 200 MG: 200 TABLET ORAL at 08:47

## 2024-05-01 RX ADMIN — PANTOPRAZOLE SODIUM 40 MG: 40 TABLET, DELAYED RELEASE ORAL at 05:54

## 2024-05-01 RX ADMIN — LORAZEPAM 2 MG: 1 TABLET ORAL at 09:03

## 2024-05-01 RX ADMIN — HYDROMORPHONE HYDROCHLORIDE 0.5 MG: 1 INJECTION, SOLUTION INTRAMUSCULAR; INTRAVENOUS; SUBCUTANEOUS at 20:20

## 2024-05-01 RX ADMIN — SODIUM CHLORIDE, POTASSIUM CHLORIDE, SODIUM LACTATE AND CALCIUM CHLORIDE: 600; 310; 30; 20 INJECTION, SOLUTION INTRAVENOUS at 15:54

## 2024-05-01 RX ADMIN — LEVOTHYROXINE SODIUM 25 MCG: 0.03 TABLET ORAL at 08:47

## 2024-05-01 RX ADMIN — SODIUM CHLORIDE, PRESERVATIVE FREE 10 ML: 5 INJECTION INTRAVENOUS at 08:48

## 2024-05-01 RX ADMIN — SODIUM CHLORIDE, POTASSIUM CHLORIDE, SODIUM LACTATE AND CALCIUM CHLORIDE: 600; 310; 30; 20 INJECTION, SOLUTION INTRAVENOUS at 08:45

## 2024-05-01 RX ADMIN — LORAZEPAM 2 MG: 2 INJECTION INTRAMUSCULAR; INTRAVENOUS at 23:44

## 2024-05-01 RX ADMIN — LORAZEPAM 1 MG: 2 INJECTION INTRAMUSCULAR; INTRAVENOUS at 05:54

## 2024-05-01 RX ADMIN — BUPRENORPHINE HYDROCHLORIDE AND NALOXONE HYDROCHLORIDE DIHYDRATE 1 TABLET: 2; .5 TABLET SUBLINGUAL at 08:47

## 2024-05-01 RX ADMIN — SODIUM CHLORIDE, POTASSIUM CHLORIDE, SODIUM LACTATE AND CALCIUM CHLORIDE 1000 ML: 600; 310; 30; 20 INJECTION, SOLUTION INTRAVENOUS at 23:31

## 2024-05-01 RX ADMIN — THERA TABS 1 TABLET: TAB at 08:47

## 2024-05-01 RX ADMIN — VALACYCLOVIR 1000 MG: 500 TABLET, FILM COATED ORAL at 09:03

## 2024-05-01 RX ADMIN — HYDROMORPHONE HYDROCHLORIDE 0.5 MG: 1 INJECTION, SOLUTION INTRAMUSCULAR; INTRAVENOUS; SUBCUTANEOUS at 12:30

## 2024-05-01 RX ADMIN — LORAZEPAM 1 MG: 1 TABLET ORAL at 18:18

## 2024-05-01 ASSESSMENT — PAIN DESCRIPTION - DESCRIPTORS
DESCRIPTORS: ACHING;STABBING
DESCRIPTORS: ACHING;STABBING
DESCRIPTORS: ACHING
DESCRIPTORS: ACHING

## 2024-05-01 ASSESSMENT — PAIN SCALES - GENERAL
PAINLEVEL_OUTOF10: 10
PAINLEVEL_OUTOF10: 9
PAINLEVEL_OUTOF10: 9
PAINLEVEL_OUTOF10: 10

## 2024-05-01 ASSESSMENT — PAIN DESCRIPTION - LOCATION
LOCATION: ABDOMEN

## 2024-05-01 ASSESSMENT — PAIN DESCRIPTION - ORIENTATION
ORIENTATION: RIGHT;MID
ORIENTATION: UPPER
ORIENTATION: OTHER (COMMENT)

## 2024-05-01 ASSESSMENT — PAIN - FUNCTIONAL ASSESSMENT
PAIN_FUNCTIONAL_ASSESSMENT: PREVENTS OR INTERFERES SOME ACTIVE ACTIVITIES AND ADLS
PAIN_FUNCTIONAL_ASSESSMENT: ACTIVITIES ARE NOT PREVENTED
PAIN_FUNCTIONAL_ASSESSMENT: ACTIVITIES ARE NOT PREVENTED

## 2024-05-01 ASSESSMENT — PAIN DESCRIPTION - PAIN TYPE: TYPE: ACUTE PAIN

## 2024-05-01 NOTE — ACP (ADVANCE CARE PLANNING)
Advance Care Planning     Advance Care Planning Activator (Inpatient)  Conversation Note      Date of ACP Conversation: 5/1/2024     Conversation Conducted with: Patient with Decision Making Capacity    ACP Activator: Vandana Coburn RN    Health Care Decision Maker:     Current Designated Health Care Decision Maker:     Primary Decision Maker: Mahi Burch - Parent - [phone]    Today we documented Decision Maker(s) consistent with Legal Next of Kin hierarchy.    Care Preferences    Ventilation:  \"If you were in your present state of health and suddenly became very ill and were unable to breathe on your own, what would your preference be about the use of a ventilator (breathing machine) if it were available to you?\"      Would the patient desire the use of ventilator (breathing machine)?: yes    \"If your health worsens and it becomes clear that your chance of recovery is unlikely, what would your preference be about the use of a ventilator (breathing machine) if it were available to you?\"     Would the patient desire the use of ventilator (breathing machine)?: Yes      Resuscitation  \"CPR works best to restart the heart when there is a sudden event, like a heart attack, in someone who is otherwise healthy. Unfortunately, CPR does not typically restart the heart for people who have serious health conditions or who are very sick.\"    \"In the event your heart stopped as a result of an underlying serious health condition, would you want attempts to be made to restart your heart (answer \"yes\" for attempt to resuscitate) or would you prefer a natural death (answer \"no\" for do not attempt to resuscitate)?\" yes       [] Yes   [] No   Educated Patient / Decision Maker regarding differences between Advance Directives and portable DNR orders.    Length of ACP Conversation in minutes:      Conversation Outcomes:  ACP discussion completed    Follow-up plan:    [] Schedule follow-up conversation to continue planning  []  Referred individual to Provider for additional questions/concerns   [] Advised patient/agent/surrogate to review completed ACP document and update if needed with changes in condition, patient preferences or care setting    [x] This note routed to one or more involved healthcare providers    #[phone]  Electronically signed by Vandana Coburn RN on 5/1/2024 at 2:32 PM

## 2024-05-01 NOTE — CONSULTS
2024 (Within Days)   SpO2 96%   BMI 22.10 kg/m²         Intake/Output Summary (Last 24 hours) at 2024 1423  Last data filed at 2024 0525  Gross per 24 hour   Intake 3030 ml   Output 401 ml   Net 2629 ml     I/O since adm:     WEIGHT:Admit Weight - Scale: 59.6 kg (131 lb 6.3 oz)         Today  Weight - Scale: 64 kg (141 lb 1.5 oz)   DRY WEIGHT:  Wt Readings from Last 3 Encounters:   24 64 kg (141 lb 1.5 oz)   24 58.7 kg (129 lb 8 oz)   24 64.6 kg (142 lb 6.7 oz)         Temp  Av.3 °F (36.8 °C)  Min: 98.1 °F (36.7 °C)  Max: 98.4 °F (36.9 °C)  Pulse  Av.7  Min: 63  Max: 94  BP  Min: 86/73  Max: 156/97  SpO2  Av.2 %  Min: 90 %  Max: 99 %    Physical Exam:   GEN: Appears ill, no acute distress  SKIN: Pink, warm, dry. Nails without clubbing.  HEENT: PERRLA. Normocephalic, atraumatic. Neck supple. No adenopathy.  LUNG: AP diameter normal. Diminished bilateral bases. No wheeze, rales, or ronchi. Respiratory effort normal.  HEART: S1S2 A/R. No JVD. No carotid bruit. No murmur, rub or gallop.  ABD: Firm, distended +BS X 4 quads. No hepatomegaly.   EXT: Radial and pedal pulses 2+ and symmetric. Without varicosities. No edema.  MUSCSKEL: Good ROM X4 extremities. No deformity.   NEURO: A/O X3 lethargic Calm and cooperative.       Labs: I have reviewed all labs below today.   CBC   Recent Labs     24  1326 24  1300 24  0456   WBC 5.6 3.6* 5.2   HGB 8.7* 7.6* 8.5*   HCT 26.6* 23.8* 26.9*   MCV 90.0 89.5 90.3   * 79* 82*       Chemistry   Recent Labs     24  0456    138 138   K 3.3* 3.3* 3.5    100 101   CO2 31 30 28   PHOS  --   --  2.3*   BUN 6* 7 7   CREATININE 0.6 0.6 0.7       Glucose   Recent Labs     24  0456   GLUCOSE 138* 125* 96       Liver   Lab Results   Component Value Date/Time    AST 79 2024 04:56 AM    ALT 60 2024 04:56 AM    LIPASE 18.0 2024  Mag and K, hypothyroid may be contributing.  Amio and hydroxyzine can also prolong QT. She reports she was compliant taking both at home PTA.  At risk for torsades, no VT this adm  Keep K>4, Mag>2  Previously advised ETOH cessation- has not complied.   Not an ICD candidate    2.) NSVT:   None this adm  OK to Cont Amiodarone with elevated liver enzymes    3.) Elevated alk phos, ALT, AST:  Hx hep c    4.) Hypomagnesemia:  Resolved with replacement    5.) Hypokalemia:   Resolved with replacement    6.) Polysubstance abuse:   + cocaine on admission    7.) Anemia/thrombocytopenia:   Iron deficient  Tx per primary team    8.) UTI:  Tx per primary team    The assessment and plan were discussed with Dr. Hayley Jhaveri from CV standpoint, cards will sign off.    MACK Enciso  [facility]  [address], [address]  [place]  [address]  Phone: [phone]  Fax: [phone]    Electronically signed by MACK Funes - CNP on 5/1/2024 at 2:23 PM

## 2024-05-01 NOTE — CARE COORDINATION
05/01/24 1429   Readmission Assessment   Number of Days since last admission? 8-30 days   Previous Disposition Home with Family   Who is being Interviewed Patient   What was the patient's/caregiver's perception as to why they think they needed to return back to the hospital? Other (Comment)  (sent by PCP for abnormal labs)   Did you visit your Primary Care Physician after you left the hospital, before you returned this time? Yes   Did you see a specialist, such as Cardiac, Pulmonary, Orthopedic Physician, etc. after you left the hospital? No   Who advised the patient to return to the hospital? Physician   Does the patient report anything that got in the way of taking their medications? No   In our efforts to provide the best possible care to you and others like you, can you think of anything that we could have done to help you after you left the hospital the first time, so that you might not have needed to return so soon? Discharge instructions that are concise, clear, and non contradictory     #[phone]  Electronically signed by Vandana Coburn RN on 5/1/2024 at 2:31 PM

## 2024-05-01 NOTE — CARE COORDINATION
Case Management Assessment  Initial Evaluation    Date/Time of Evaluation: 5/1/2024 2:35 PM  Assessment Completed by: Vandana Coburn RN    If patient is discharged prior to next notation, then this note serves as note for discharge by case management.    Patient Name: Lali Cazares                   YOB: 1975  Diagnosis: Hypokalemia [E87.6]  Hypomagnesemia [E83.42]  Acute abdominal pain [R10.9]  Alcohol abuse [F10.10]  Thrombocytopenia (HCC) [D69.6]  Near syncope [R55]  Cocaine use [F14.90]  Pancytopenia (HCC) [D61.818]  Anemia, unspecified type [D64.9]  Acute pancreatitis, unspecified complication status, unspecified pancreatitis type [K85.90]                   Date / Time: 4/30/2024 12:43 PM    Patient Admission Status: Inpatient   Readmission Risk (Low < 19, Mod (19-27), High > 27): Readmission Risk Score: 28.6    Current PCP: Elly Mohan APRN  PCP verified by CM? Yes    Chart Reviewed: Yes      History Provided by: Patient, Medical Record  Patient Orientation: Unable to Assess    Patient Cognition: Other (see comment) (not very awake, answered some yes/no questions)    Hospitalization in the last 30 days (Readmission):  Yes    If yes, Readmission Assessment in CM Navigator will be completed.    Advance Directives:      Code Status: Full Code   Patient's Primary Decision Maker is: Legal Next of Kin    Primary Decision Maker: Mahi Burch - Parent - [phone]    Discharge Planning:    Patient lives with: Spouse/Significant Other, Children (son and sig other) Type of Home: House  Primary Care Giver: Self  Patient Support Systems include: Children, Spouse/Significant Other, Family Members   Current Financial resources: Medicaid  Current community resources: [employer] (Was active with [facility], verifying if still active)  Current services prior to admission: Other (Comment) (verifying if still active with [facility])            Current DME:  None            Type of Home

## 2024-05-02 PROCEDURE — 6360000002 HC RX W HCPCS: Performed by: INTERNAL MEDICINE

## 2024-05-02 PROCEDURE — 2500000003 HC RX 250 WO HCPCS: Performed by: INTERNAL MEDICINE

## 2024-05-02 PROCEDURE — 2580000003 HC RX 258: Performed by: INTERNAL MEDICINE

## 2024-05-02 PROCEDURE — 94640 AIRWAY INHALATION TREATMENT: CPT

## 2024-05-02 PROCEDURE — 2060000000 HC ICU INTERMEDIATE R&B

## 2024-05-02 PROCEDURE — 6370000000 HC RX 637 (ALT 250 FOR IP): Performed by: INTERNAL MEDICINE

## 2024-05-02 PROCEDURE — 94760 N-INVAS EAR/PLS OXIMETRY 1: CPT

## 2024-05-02 RX ORDER — CALCIUM CARBONATE 500 MG/1
500 TABLET, CHEWABLE ORAL 3 TIMES DAILY PRN
Status: DISCONTINUED | OUTPATIENT
Start: 2024-05-02 | End: 2024-05-04 | Stop reason: HOSPADM

## 2024-05-02 RX ADMIN — SODIUM CHLORIDE: 9 INJECTION, SOLUTION INTRAVENOUS at 18:13

## 2024-05-02 RX ADMIN — SODIUM CHLORIDE, POTASSIUM CHLORIDE, SODIUM LACTATE AND CALCIUM CHLORIDE: 600; 310; 30; 20 INJECTION, SOLUTION INTRAVENOUS at 13:32

## 2024-05-02 RX ADMIN — LEVOTHYROXINE SODIUM 25 MCG: 0.03 TABLET ORAL at 10:06

## 2024-05-02 RX ADMIN — ASPIRIN 81 MG: 81 TABLET, CHEWABLE ORAL at 10:06

## 2024-05-02 RX ADMIN — POTASSIUM PHOSPHATE, MONOBASIC POTASSIUM PHOSPHATE, DIBASIC 15 MMOL: 224; 236 INJECTION, SOLUTION, CONCENTRATE INTRAVENOUS at 18:15

## 2024-05-02 RX ADMIN — SODIUM CHLORIDE, POTASSIUM CHLORIDE, SODIUM LACTATE AND CALCIUM CHLORIDE: 600; 310; 30; 20 INJECTION, SOLUTION INTRAVENOUS at 06:33

## 2024-05-02 RX ADMIN — LORAZEPAM 2 MG: 2 INJECTION INTRAMUSCULAR; INTRAVENOUS at 10:06

## 2024-05-02 RX ADMIN — LORAZEPAM 2 MG: 2 INJECTION INTRAMUSCULAR; INTRAVENOUS at 04:18

## 2024-05-02 RX ADMIN — Medication 400 MG: at 10:06

## 2024-05-02 RX ADMIN — VALACYCLOVIR 1000 MG: 500 TABLET, FILM COATED ORAL at 10:11

## 2024-05-02 RX ADMIN — ALBUTEROL SULFATE 2 PUFF: 90 AEROSOL, METERED RESPIRATORY (INHALATION) at 04:03

## 2024-05-02 RX ADMIN — SODIUM CHLORIDE, PRESERVATIVE FREE 10 ML: 5 INJECTION INTRAVENOUS at 10:07

## 2024-05-02 RX ADMIN — BUPRENORPHINE HYDROCHLORIDE AND NALOXONE HYDROCHLORIDE DIHYDRATE 1 TABLET: 2; .5 TABLET SUBLINGUAL at 10:22

## 2024-05-02 RX ADMIN — PANTOPRAZOLE SODIUM 40 MG: 40 TABLET, DELAYED RELEASE ORAL at 10:06

## 2024-05-02 RX ADMIN — THERA TABS 1 TABLET: TAB at 10:06

## 2024-05-02 RX ADMIN — ENOXAPARIN SODIUM 40 MG: 100 INJECTION SUBCUTANEOUS at 10:06

## 2024-05-02 RX ADMIN — AMIODARONE HYDROCHLORIDE 200 MG: 200 TABLET ORAL at 10:06

## 2024-05-02 RX ADMIN — LORAZEPAM 1 MG: 2 INJECTION INTRAMUSCULAR; INTRAVENOUS at 23:40

## 2024-05-02 RX ADMIN — Medication 100 MG: at 10:06

## 2024-05-02 ASSESSMENT — PAIN SCALES - WONG BAKER
WONGBAKER_NUMERICALRESPONSE: NO HURT
WONGBAKER_NUMERICALRESPONSE: HURTS A LITTLE BIT
WONGBAKER_NUMERICALRESPONSE: NO HURT

## 2024-05-03 LAB
ALBUMIN SERPL-MCNC: 2.8 G/DL (ref 3.4–5)
ALBUMIN/GLOB SERPL: 1.2 {RATIO} (ref 1.1–2.2)
ALP SERPL-CCNC: 204 U/L (ref 40–129)
ALT SERPL-CCNC: 50 U/L (ref 10–40)
ANION GAP SERPL CALCULATED.3IONS-SCNC: 6 MMOL/L (ref 3–16)
AST SERPL-CCNC: 69 U/L (ref 15–37)
BASOPHILS # BLD: 0 K/UL (ref 0–0.2)
BASOPHILS NFR BLD: 0.7 %
BILIRUB SERPL-MCNC: 0.9 MG/DL (ref 0–1)
BUN SERPL-MCNC: 9 MG/DL (ref 7–20)
CALCIUM SERPL-MCNC: 8 MG/DL (ref 8.3–10.6)
CHLORIDE SERPL-SCNC: 104 MMOL/L (ref 99–110)
CO2 SERPL-SCNC: 29 MMOL/L (ref 21–32)
CREAT SERPL-MCNC: <0.5 MG/DL (ref 0.6–1.1)
DEPRECATED RDW RBC AUTO: 21.9 % (ref 12.4–15.4)
EOSINOPHIL # BLD: 0.1 K/UL (ref 0–0.6)
EOSINOPHIL NFR BLD: 1.1 %
GFR SERPLBLD CREATININE-BSD FMLA CKD-EPI: >90 ML/MIN/{1.73_M2}
GLUCOSE SERPL-MCNC: 105 MG/DL (ref 70–99)
HCT VFR BLD AUTO: 28.7 % (ref 36–48)
HCV GENTYP SERPL NAA+PROBE: NORMAL
HGB BLD-MCNC: 9.1 G/DL (ref 12–16)
LYMPHOCYTES # BLD: 1.2 K/UL (ref 1–5.1)
LYMPHOCYTES NFR BLD: 18.9 %
MAGNESIUM SERPL-MCNC: 1.5 MG/DL (ref 1.8–2.4)
MCH RBC QN AUTO: 28.3 PG (ref 26–34)
MCHC RBC AUTO-ENTMCNC: 31.6 G/DL (ref 31–36)
MCV RBC AUTO: 89.8 FL (ref 80–100)
MONOCYTES # BLD: 0.6 K/UL (ref 0–1.3)
MONOCYTES NFR BLD: 8.5 %
NEUTROPHILS # BLD: 4.6 K/UL (ref 1.7–7.7)
NEUTROPHILS NFR BLD: 70.8 %
PHOSPHATE SERPL-MCNC: 2.9 MG/DL (ref 2.5–4.9)
PLATELET # BLD AUTO: 84 K/UL (ref 135–450)
PMV BLD AUTO: 7.5 FL (ref 5–10.5)
POTASSIUM SERPL-SCNC: 4.1 MMOL/L (ref 3.5–5.1)
PROT SERPL-MCNC: 5.2 G/DL (ref 6.4–8.2)
RBC # BLD AUTO: 3.2 M/UL (ref 4–5.2)
SODIUM SERPL-SCNC: 139 MMOL/L (ref 136–145)
WBC # BLD AUTO: 6.5 K/UL (ref 4–11)

## 2024-05-03 PROCEDURE — 80053 COMPREHEN METABOLIC PANEL: CPT

## 2024-05-03 PROCEDURE — 6370000000 HC RX 637 (ALT 250 FOR IP): Performed by: INTERNAL MEDICINE

## 2024-05-03 PROCEDURE — 36415 COLL VENOUS BLD VENIPUNCTURE: CPT

## 2024-05-03 PROCEDURE — 83735 ASSAY OF MAGNESIUM: CPT

## 2024-05-03 PROCEDURE — 84100 ASSAY OF PHOSPHORUS: CPT

## 2024-05-03 PROCEDURE — 85025 COMPLETE CBC W/AUTO DIFF WBC: CPT

## 2024-05-03 PROCEDURE — 6360000002 HC RX W HCPCS: Performed by: INTERNAL MEDICINE

## 2024-05-03 PROCEDURE — 2580000003 HC RX 258: Performed by: INTERNAL MEDICINE

## 2024-05-03 PROCEDURE — 2060000000 HC ICU INTERMEDIATE R&B

## 2024-05-03 PROCEDURE — 94760 N-INVAS EAR/PLS OXIMETRY 1: CPT

## 2024-05-03 RX ORDER — CHLORDIAZEPOXIDE HYDROCHLORIDE 5 MG/1
10 CAPSULE, GELATIN COATED ORAL EVERY 6 HOURS PRN
Status: DISCONTINUED | OUTPATIENT
Start: 2024-05-03 | End: 2024-05-04 | Stop reason: HOSPADM

## 2024-05-03 RX ADMIN — AMIODARONE HYDROCHLORIDE 200 MG: 200 TABLET ORAL at 09:47

## 2024-05-03 RX ADMIN — SODIUM CHLORIDE: 9 INJECTION, SOLUTION INTRAVENOUS at 22:36

## 2024-05-03 RX ADMIN — Medication 400 MG: at 09:47

## 2024-05-03 RX ADMIN — BUPRENORPHINE HYDROCHLORIDE AND NALOXONE HYDROCHLORIDE DIHYDRATE 1 TABLET: 2; .5 TABLET SUBLINGUAL at 09:54

## 2024-05-03 RX ADMIN — SODIUM CHLORIDE, PRESERVATIVE FREE 10 ML: 5 INJECTION INTRAVENOUS at 22:32

## 2024-05-03 RX ADMIN — Medication 100 MG: at 09:47

## 2024-05-03 RX ADMIN — MAGNESIUM SULFATE HEPTAHYDRATE 2000 MG: 40 INJECTION, SOLUTION INTRAVENOUS at 22:37

## 2024-05-03 RX ADMIN — PANTOPRAZOLE SODIUM 40 MG: 40 TABLET, DELAYED RELEASE ORAL at 06:05

## 2024-05-03 RX ADMIN — CHLORDIAZEPOXIDE HYDROCHLORIDE 10 MG: 5 CAPSULE ORAL at 17:20

## 2024-05-03 RX ADMIN — ANTACID TABLETS 500 MG: 500 TABLET, CHEWABLE ORAL at 09:47

## 2024-05-03 RX ADMIN — LORAZEPAM 2 MG: 2 INJECTION INTRAMUSCULAR; INTRAVENOUS at 09:55

## 2024-05-03 RX ADMIN — ANTACID TABLETS 500 MG: 500 TABLET, CHEWABLE ORAL at 17:20

## 2024-05-03 RX ADMIN — SODIUM CHLORIDE, PRESERVATIVE FREE 10 ML: 5 INJECTION INTRAVENOUS at 09:47

## 2024-05-03 RX ADMIN — VALACYCLOVIR 1000 MG: 500 TABLET, FILM COATED ORAL at 09:47

## 2024-05-03 RX ADMIN — ASPIRIN 81 MG: 81 TABLET, CHEWABLE ORAL at 09:47

## 2024-05-03 RX ADMIN — THERA TABS 1 TABLET: TAB at 09:47

## 2024-05-03 RX ADMIN — LEVOTHYROXINE SODIUM 25 MCG: 0.03 TABLET ORAL at 09:47

## 2024-05-03 ASSESSMENT — PAIN SCALES - WONG BAKER: WONGBAKER_NUMERICALRESPONSE: NO HURT

## 2024-05-03 NOTE — CARE COORDINATION
DISCHARGE PLANNING:    DC plan remains home with family.  Family will transport.  Patient recently active with [facility].  SHEKHAR this am at 14.  Will continue to follow for changing discharge needs.    #[phone]  Electronically signed by Vandana Coburn RN on 5/3/2024 at 1:26 PM

## 2024-05-04 VITALS
SYSTOLIC BLOOD PRESSURE: 152 MMHG | RESPIRATION RATE: 13 BRPM | BODY MASS INDEX: 23.6 KG/M2 | HEIGHT: 67 IN | OXYGEN SATURATION: 95 % | HEART RATE: 88 BPM | TEMPERATURE: 98 F | DIASTOLIC BLOOD PRESSURE: 105 MMHG | WEIGHT: 150.35 LBS

## 2024-05-04 LAB
ALBUMIN SERPL-MCNC: 2.9 G/DL (ref 3.4–5)
ALBUMIN/GLOB SERPL: 1.3 {RATIO} (ref 1.1–2.2)
ALP SERPL-CCNC: 189 U/L (ref 40–129)
ALT SERPL-CCNC: 49 U/L (ref 10–40)
ANION GAP SERPL CALCULATED.3IONS-SCNC: 8 MMOL/L (ref 3–16)
AST SERPL-CCNC: 66 U/L (ref 15–37)
BACTERIA BLD CULT: NORMAL
BASOPHILS # BLD: 0.1 K/UL (ref 0–0.2)
BASOPHILS NFR BLD: 0.9 %
BILIRUB SERPL-MCNC: 0.6 MG/DL (ref 0–1)
BUN SERPL-MCNC: 9 MG/DL (ref 7–20)
CALCIUM SERPL-MCNC: 8 MG/DL (ref 8.3–10.6)
CHLORIDE SERPL-SCNC: 106 MMOL/L (ref 99–110)
CO2 SERPL-SCNC: 27 MMOL/L (ref 21–32)
CREAT SERPL-MCNC: <0.5 MG/DL (ref 0.6–1.1)
DEPRECATED RDW RBC AUTO: 21.7 % (ref 12.4–15.4)
EOSINOPHIL # BLD: 0.1 K/UL (ref 0–0.6)
EOSINOPHIL NFR BLD: 0.8 %
GFR SERPLBLD CREATININE-BSD FMLA CKD-EPI: >90 ML/MIN/{1.73_M2}
GLUCOSE SERPL-MCNC: 105 MG/DL (ref 70–99)
HCT VFR BLD AUTO: 27.9 % (ref 36–48)
HGB BLD-MCNC: 8.8 G/DL (ref 12–16)
LYMPHOCYTES # BLD: 1.4 K/UL (ref 1–5.1)
LYMPHOCYTES NFR BLD: 20.1 %
MAGNESIUM SERPL-MCNC: 1.6 MG/DL (ref 1.8–2.4)
MCH RBC QN AUTO: 28 PG (ref 26–34)
MCHC RBC AUTO-ENTMCNC: 31.5 G/DL (ref 31–36)
MCV RBC AUTO: 88.9 FL (ref 80–100)
MONOCYTES # BLD: 0.7 K/UL (ref 0–1.3)
MONOCYTES NFR BLD: 9.7 %
NEUTROPHILS # BLD: 4.8 K/UL (ref 1.7–7.7)
NEUTROPHILS NFR BLD: 68.5 %
PHOSPHATE SERPL-MCNC: 3 MG/DL (ref 2.5–4.9)
PLATELET # BLD AUTO: 76 K/UL (ref 135–450)
PMV BLD AUTO: 7.6 FL (ref 5–10.5)
POTASSIUM SERPL-SCNC: 4.1 MMOL/L (ref 3.5–5.1)
PROT SERPL-MCNC: 5.2 G/DL (ref 6.4–8.2)
RBC # BLD AUTO: 3.13 M/UL (ref 4–5.2)
SODIUM SERPL-SCNC: 141 MMOL/L (ref 136–145)
WBC # BLD AUTO: 7 K/UL (ref 4–11)

## 2024-05-04 PROCEDURE — 6370000000 HC RX 637 (ALT 250 FOR IP): Performed by: INTERNAL MEDICINE

## 2024-05-04 PROCEDURE — 36415 COLL VENOUS BLD VENIPUNCTURE: CPT

## 2024-05-04 PROCEDURE — 84100 ASSAY OF PHOSPHORUS: CPT

## 2024-05-04 PROCEDURE — 85025 COMPLETE CBC W/AUTO DIFF WBC: CPT

## 2024-05-04 PROCEDURE — 83735 ASSAY OF MAGNESIUM: CPT

## 2024-05-04 PROCEDURE — 6360000002 HC RX W HCPCS: Performed by: INTERNAL MEDICINE

## 2024-05-04 PROCEDURE — 94760 N-INVAS EAR/PLS OXIMETRY 1: CPT

## 2024-05-04 PROCEDURE — 2580000003 HC RX 258: Performed by: INTERNAL MEDICINE

## 2024-05-04 PROCEDURE — 80053 COMPREHEN METABOLIC PANEL: CPT

## 2024-05-04 RX ORDER — OMEPRAZOLE 40 MG/1
40 CAPSULE, DELAYED RELEASE ORAL
Qty: 90 CAPSULE | Refills: 1 | Status: SHIPPED | OUTPATIENT
Start: 2024-05-04

## 2024-05-04 RX ORDER — TRAMADOL HYDROCHLORIDE 50 MG/1
50 TABLET ORAL EVERY 6 HOURS PRN
Status: DISCONTINUED | OUTPATIENT
Start: 2024-05-04 | End: 2024-05-04 | Stop reason: HOSPADM

## 2024-05-04 RX ADMIN — THERA TABS 1 TABLET: TAB at 08:24

## 2024-05-04 RX ADMIN — ASPIRIN 81 MG: 81 TABLET, CHEWABLE ORAL at 08:23

## 2024-05-04 RX ADMIN — AMIODARONE HYDROCHLORIDE 200 MG: 200 TABLET ORAL at 08:23

## 2024-05-04 RX ADMIN — MAGNESIUM SULFATE HEPTAHYDRATE 2000 MG: 40 INJECTION, SOLUTION INTRAVENOUS at 08:37

## 2024-05-04 RX ADMIN — ANTACID TABLETS 500 MG: 500 TABLET, CHEWABLE ORAL at 01:14

## 2024-05-04 RX ADMIN — DIPHENHYDRAMINE HYDROCHLORIDE 5 ML: 12.5 LIQUID ORAL at 11:32

## 2024-05-04 RX ADMIN — Medication 400 MG: at 08:23

## 2024-05-04 RX ADMIN — LEVOTHYROXINE SODIUM 25 MCG: 0.03 TABLET ORAL at 08:23

## 2024-05-04 RX ADMIN — PANTOPRAZOLE SODIUM 40 MG: 40 TABLET, DELAYED RELEASE ORAL at 06:33

## 2024-05-04 RX ADMIN — SODIUM CHLORIDE, PRESERVATIVE FREE 10 ML: 5 INJECTION INTRAVENOUS at 08:38

## 2024-05-04 RX ADMIN — VALACYCLOVIR 1000 MG: 500 TABLET, FILM COATED ORAL at 08:28

## 2024-05-04 RX ADMIN — CHLORDIAZEPOXIDE HYDROCHLORIDE 10 MG: 5 CAPSULE ORAL at 08:27

## 2024-05-04 RX ADMIN — BUPRENORPHINE HYDROCHLORIDE AND NALOXONE HYDROCHLORIDE DIHYDRATE 1 TABLET: 2; .5 TABLET SUBLINGUAL at 08:23

## 2024-05-04 RX ADMIN — CHLORDIAZEPOXIDE HYDROCHLORIDE 10 MG: 5 CAPSULE ORAL at 01:20

## 2024-05-04 RX ADMIN — TRAMADOL HYDROCHLORIDE 50 MG: 50 TABLET ORAL at 09:43

## 2024-05-04 RX ADMIN — Medication 100 MG: at 08:23

## 2024-05-04 ASSESSMENT — PAIN DESCRIPTION - LOCATION: LOCATION: BACK

## 2024-05-04 ASSESSMENT — PAIN SCALES - GENERAL: PAINLEVEL_OUTOF10: 8

## 2024-05-04 NOTE — PLAN OF CARE
Problem: Discharge Planning  Goal: Discharge to home or other facility with appropriate resources  5/2/2024 0457 by Cesar Robbins RN  Outcome: Progressing     Problem: Pain  Goal: Verbalizes/displays adequate comfort level or baseline comfort level  5/2/2024 0457 by Cesar Robbins RN  Outcome: Progressing     Problem: Safety - Adult  Goal: Free from fall injury  5/2/2024 0457 by Cesar Robbins RN  Outcome: Progressing     Problem: ABCDS Injury Assessment  Goal: Absence of physical injury  5/2/2024 0457 by Cesar Robbins RN  Outcome: Progressing     Problem: Nutrition Deficit:  Goal: Optimize nutritional status  5/2/2024 0457 by Cesar Robbins RN  Outcome: Progressing     
  Problem: Discharge Planning  Goal: Discharge to home or other facility with appropriate resources  5/2/2024 1114 by Stacey Rebolledo, RN  Outcome: Progressing     Problem: Pain  Goal: Verbalizes/displays adequate comfort level or baseline comfort level  5/2/2024 1114 by Stacey Rebolledo, RN  Outcome: Progressing     Problem: Safety - Adult  Goal: Free from fall injury  5/2/2024 1114 by Stacey Rebolledo, RN  Outcome: Progressing     Problem: ABCDS Injury Assessment  Goal: Absence of physical injury  5/2/2024 1114 by Stacey Rebolledo, RN  Outcome: Progressing     Problem: Nutrition Deficit:  Goal: Optimize nutritional status  5/2/2024 1114 by Stacey Rebolledo, RN  Outcome: Progressing     
  Problem: Discharge Planning  Goal: Discharge to home or other facility with appropriate resources  5/2/2024 2306 by Cheli Che RN  Outcome: Progressing  Flowsheets (Taken 5/2/2024 2306)  Discharge to home or other facility with appropriate resources: Identify barriers to discharge with patient and caregiver     Problem: Pain  Goal: Verbalizes/displays adequate comfort level or baseline comfort level  5/2/2024 2306 by Cheli Che, RN  Outcome: Progressing  Flowsheets (Taken 5/2/2024 2306)  Verbalizes/displays adequate comfort level or baseline comfort level:   Encourage patient to monitor pain and request assistance   Assess pain using appropriate pain scale   Administer analgesics based on type and severity of pain and evaluate response   Implement non-pharmacological measures as appropriate and evaluate response     Problem: Safety - Adult  Goal: Free from fall injury  5/2/2024 2306 by Cheli Che, RN  Outcome: Progressing  Flowsheets (Taken 5/2/2024 2306)  Free From Fall Injury: Instruct family/caregiver on patient safety     Problem: Nutrition Deficit:  Goal: Optimize nutritional status  5/2/2024 2306 by Cheli Che RN  Outcome: Progressing  Flowsheets (Taken 5/2/2024 2306)  Nutrient intake appropriate for improving, restoring, or maintaining nutritional needs:   Monitor oral intake, labs, and treatment plans   Assess nutritional status and recommend course of action     Problem: Skin/Tissue Integrity  Goal: Absence of new skin breakdown  Description: 1.  Monitor for areas of redness and/or skin breakdown  2.  Assess vascular access sites hourly  3.  Every 4-6 hours minimum:  Change oxygen saturation probe site  4.  Every 4-6 hours:  If on nasal continuous positive airway pressure, respiratory therapy assess nares and determine need for appliance change or resting period.  Outcome: Progressing     
  Problem: Discharge Planning  Goal: Discharge to home or other facility with appropriate resources  5/3/2024 1052 by Stacey Rebolledo, RN  Outcome: Progressing     Problem: Pain  Goal: Verbalizes/displays adequate comfort level or baseline comfort level  5/3/2024 1052 by Stacey Rebolledo, RN  Outcome: Progressing     Problem: Safety - Adult  Goal: Free from fall injury  5/3/2024 1052 by Stacey Rebolledo, RN  Outcome: Progressing     Problem: ABCDS Injury Assessment  Goal: Absence of physical injury  Outcome: Progressing     Problem: Nutrition Deficit:  Goal: Optimize nutritional status  5/3/2024 1052 by Stacey Rebolledo, RN  Outcome: Progressing     Problem: Skin/Tissue Integrity  Goal: Absence of new skin breakdown  5/3/2024 1052 by Stacey Rebolledo, RN  Outcome: Progressing     
  Problem: Discharge Planning  Goal: Discharge to home or other facility with appropriate resources  5/4/2024 0034 by Rere Porras RN  Outcome: Progressing     Problem: Pain  Goal: Verbalizes/displays adequate comfort level or baseline comfort level  5/4/2024 0034 by Rere Porras RN  Outcome: Progressing     Problem: Safety - Adult  Goal: Free from fall injury  5/4/2024 0034 by Rere Porras RN  Outcome: Progressing     Problem: ABCDS Injury Assessment  Goal: Absence of physical injury  5/4/2024 0034 by Rere Porras RN  Outcome: Progressing     Problem: Nutrition Deficit:  Goal: Optimize nutritional status  5/4/2024 0034 by Rere Porras RN  Outcome: Progressing     Problem: Skin/Tissue Integrity  Goal: Absence of new skin breakdown  Description: 1.  Monitor for areas of redness and/or skin breakdown  2.  Assess vascular access sites hourly  3.  Every 4-6 hours minimum:  Change oxygen saturation probe site  4.  Every 4-6 hours:  If on nasal continuous positive airway pressure, respiratory therapy assess nares and determine need for appliance change or resting period.  5/4/2024 0034 by Rere Porras RN  Outcome: Progressing     
  Problem: Discharge Planning  Goal: Discharge to home or other facility with appropriate resources  5/4/2024 0917 by Mikie Vega, RN  Outcome: Progressing     Problem: Pain  Goal: Verbalizes/displays adequate comfort level or baseline comfort level  5/4/2024 0917 by Mikie Vega, RN  Outcome: Progressing     Problem: Safety - Adult  Goal: Free from fall injury  5/4/2024 0917 by Mikie Vega, RN  Outcome: Progressing     Problem: ABCDS Injury Assessment  Goal: Absence of physical injury  5/4/2024 0917 by Mikie Vega, RN  Outcome: Progressing     Problem: Nutrition Deficit:  Goal: Optimize nutritional status  5/4/2024 0917 by Mikie Vega, RN  Outcome: Progressing     
  Problem: Discharge Planning  Goal: Discharge to home or other facility with appropriate resources  Outcome: Progressing     Problem: Pain  Goal: Verbalizes/displays adequate comfort level or baseline comfort level  Outcome: Progressing     Problem: Safety - Adult  Goal: Free from fall injury  Outcome: Progressing     Problem: ABCDS Injury Assessment  Goal: Absence of physical injury  Outcome: Progressing     Problem: Nutrition Deficit:  Goal: Optimize nutritional status  Outcome: Progressing     
need for appliance change or resting period.  5/2/2024 4968 by Cheli Che, RN  Outcome: Progressing

## 2024-05-04 NOTE — PROGRESS NOTES
Hospitalist Progress Note      PCP: Elly Mohan APRN    Date of Admission: 4/30/2024    Subjective: cont to feel shaky, needing ativan, c/o abd pain    Medications:  Reviewed    Infusion Medications    sodium chloride       Scheduled Medications    sodium chloride flush  5-40 mL IntraVENous 2 times per day    thiamine  100 mg Oral Daily    enoxaparin  40 mg SubCUTAneous Daily    multivitamin  1 tablet Oral Daily    amiodarone  200 mg Oral Daily    aspirin  81 mg Oral Daily    buprenorphine-naloxone  1 tablet SubLINGual Daily    levothyroxine  25 mcg Oral Daily    magnesium oxide  400 mg Oral Daily    pantoprazole  40 mg Oral QAM AC    valACYclovir  1,000 mg Oral Daily     PRN Meds: calcium carbonate, HYDROmorphone, sodium chloride flush, sodium chloride, potassium chloride **OR** potassium alternative oral replacement **OR** potassium chloride, magnesium sulfate, polyethylene glycol, acetaminophen **OR** acetaminophen, albuterol sulfate HFA, LORazepam **OR** LORazepam **OR** LORazepam **OR** LORazepam **OR** LORazepam **OR** LORazepam **OR** LORazepam **OR** LORazepam      Intake/Output Summary (Last 24 hours) at 5/2/2024 1614  Last data filed at 5/2/2024 1151  Gross per 24 hour   Intake 560 ml   Output 1 ml   Net 559 ml       Physical Exam Performed:    BP (!) 176/110   Pulse 95   Temp 98.3 °F (36.8 °C) (Oral)   Resp 26   Ht 1.702 m (5' 7\")   Wt 64 kg (141 lb 1.5 oz)   LMP 03/30/2024 (Within Days)   SpO2 93%   BMI 22.10 kg/m²     General appearance:  No apparent distress  HEENT:  Normal cephalic  Respiratory:  Normal respiratory effort. Clear to auscultation, bilaterally without Rales/Wheezes/Rhonchi.  Cardiovascular:  Regular rate and rhythm with normal S1/S2 without murmurs, rubs or gallops.  Abdomen: Soft, mild tenderness in epigastric region  Musculoskeletal:  No clubbing, cyanosis   Neurologic: No focal weakness  Psychiatric:  Alert and oriented  Capillary Refill: Brisk,3 seconds, 
      Hospitalist Progress Note      PCP: Elly Mohan APRN    Date of Admission: 4/30/2024    Subjective: pt appears more sleepy during the day, has been more awake at night per RN    Medications:  Reviewed    Infusion Medications    sodium chloride Stopped (05/02/24 2340)     Scheduled Medications    sodium chloride flush  5-40 mL IntraVENous 2 times per day    thiamine  100 mg Oral Daily    enoxaparin  40 mg SubCUTAneous Daily    multivitamin  1 tablet Oral Daily    amiodarone  200 mg Oral Daily    aspirin  81 mg Oral Daily    buprenorphine-naloxone  1 tablet SubLINGual Daily    levothyroxine  25 mcg Oral Daily    magnesium oxide  400 mg Oral Daily    pantoprazole  40 mg Oral QAM AC    valACYclovir  1,000 mg Oral Daily     PRN Meds: chlordiazePOXIDE, calcium carbonate, [Held by provider] HYDROmorphone, sodium chloride flush, sodium chloride, potassium chloride **OR** potassium alternative oral replacement **OR** potassium chloride, magnesium sulfate, polyethylene glycol, acetaminophen **OR** acetaminophen, albuterol sulfate HFA      Intake/Output Summary (Last 24 hours) at 5/3/2024 1519  Last data filed at 5/3/2024 1132  Gross per 24 hour   Intake 6001.6 ml   Output 0 ml   Net 6001.6 ml       Physical Exam Performed:    BP (!) 145/97   Pulse (!) 104   Temp 98.8 °F (37.1 °C) (Oral)   Resp 18   Ht 1.702 m (5' 7\")   Wt 67.8 kg (149 lb 7.6 oz)   LMP 03/30/2024 (Within Days)   SpO2 94%   BMI 23.41 kg/m²     General appearance:  No apparent distress  HEENT:  Normal cephalic  Respiratory:  Normal respiratory effort. Clear to auscultation, bilaterally without Rales/Wheezes/Rhonchi.  Cardiovascular:  Regular rate and rhythm with normal S1/S2 without murmurs, rubs or gallops.  Abdomen: Soft, mild tenderness in epigastric region  Musculoskeletal:  No clubbing, cyanosis   Neurologic: No focal weakness  Psychiatric:  sleepy  Capillary Refill: Brisk,3 seconds, normal  Peripheral Pulses: +2 palpable, equal 
4 Eyes Skin Assessment     NAME:  Lali Cazares  YOB: 1975  MEDICAL RECORD NUMBER:  [id number]    The patient is being assessed for  Admission    I agree that at least one RN has performed a thorough Head to Toe Skin Assessment on the patient. ALL assessment sites listed below have been assessed.      Areas assessed by both nurses:    Head, Face, Ears, Shoulders, Back, Chest, Arms, Elbows, Hands, Sacrum. Buttock, Coccyx, Ischium, Legs. Feet and Heels, and Under Medical Devices         Does the Patient have a Wound? No noted wound(s)       Waqar Prevention initiated by RN: No  Wound Care Orders initiated by RN: No    Pressure Injury (Stage 3,4, Unstageable, DTI, NWPT, and Complex wounds) if present, place Wound referral order by RN under : No    New Ostomies, if present place, Ostomy referral order under : No     Nurse 1 eSignature: Electronically signed by Etelvina Nelson RN on 5/1/24 at 1:08 AM EDT    **SHARE this note so that the co-signing nurse can place an eSignature**    Nurse 2 eSignature: Electronically signed by Sanjiv Gong RN on 5/1/24 at 1:09 AM EDT    
Comprehensive Nutrition Assessment    Type and Reason for Visit:  Initial, Positive Nutrition Screen    Nutrition Recommendations/Plan:   Continue regular diet, monitor intakes     Malnutrition Assessment:  Malnutrition Status:  No malnutrition (05/01/24 1321)    Context:  Acute Illness       Nutrition Assessment:    MST 2. Pt presenting w/ near syncope. Pt w/ stable wt pta, decreased intake due to abdominal pain. Pt w/ good intake at lunch time today, finished almost entire meal. Pt expresses no concerns at this time. Will continue to monitor intake.    Nutrition Related Findings:    nutrition related labs reviewed Wound Type: None       Current Nutrition Intake & Therapies:    Average Meal Intake: %  Average Supplements Intake: None Ordered  ADULT DIET; Regular    Anthropometric Measures:  Height: 170.2 cm (5' 7\")  Ideal Body Weight (IBW): 135 lbs (61 kg)       Current Body Weight: 64 kg (141 lb 1.5 oz),   IBW.    Current BMI (kg/m2): 22.1                               Estimated Daily Nutrient Needs:  Energy Requirements Based On: Kcal/kg  Weight Used for Energy Requirements: Current  Energy (kcal/day): 1600 - 1920 (25 - 30 kcal/kg)  Weight Used for Protein Requirements: Current  Protein (g/day): 64 - 77 (1-1.2 g/kg)  Method Used for Fluid Requirements: 1 ml/kcal  Fluid (ml/day): or per provider    Nutrition Diagnosis:   Inadequate oral intake related to pain as evidenced by poor intake prior to admission    Nutrition Interventions:   Food and/or Nutrient Delivery: Continue Current Diet  Nutrition Education/Counseling: Education not indicated  Coordination of Nutrition Care: Continue to monitor while inpatient       Goals:     Goals: Meet at least 75% of estimated needs, by next RD assessment       Nutrition Monitoring and Evaluation:   Behavioral-Environmental Outcomes: None Identified  Food/Nutrient Intake Outcomes: Food and Nutrient Intake  Physical Signs/Symptoms Outcomes: Biochemical Data, Weight, 
Medication Reconciliation     List of medications patient is currently taking is complete.     Source of information: 1. Conversation with patient at bedside                                      2. EPIC records      Allergies  Bactrim [sulfamethoxazole-trimethoprim] and Flagyl [metronidazole]     Notes regarding home medications:  1. Patient has not received any doses of her home medications today prior to arrival in the ED.  2. Pt states she is currently using suboxone sublingual tablets with her last dose being yesterday.    Yesica Anna, Pharmacy Intern  4/30/2024 8:54 PM     
Patient discharged home per doctor order. Patient significant other to pick the patiet from hospital. Discharge instructions and meds were reviewed with the patient.   Mikie   
Patient removed  left peripheral IV,. Right hand peripheral IV in use at this time. Patient last CIWA score was 15 at 0411 am and ativan ws given.  
Patient was asleep for several hours, awakening briefly to state name and date of birth. Ambulated to bathroom with standby assistance to void around 0100. Given jello and pudding per request. Given TUMS, and Librium. Able to go back to sleep shortly after receiving Librium.   
Pt arrived to floor via stretcher from ED and ambulated to bed. Telemetry activated. Patient oriented to room and use of call light. Call light and personal items within reach. Admission and assessment initiated. POC and education initiated and reviewed with patient. Denied further needs or questions at this time.     
Pt complaining of 9/10 abdominal pain. Secure message sent via perfect serve to MD Omayra Can asking for another dose of toradol 30mg IV. New orders for diluadid 0.5mg IV.  
Pt refusing afternoon vitals at this time.   
Pt requesting pain medication. Tylenol ineffective in pain relief. Pain 9/10 in abdomen. Secure message sent via perfect serve to NP Amanda Jeronimo. New orders for toradol 30mg IV.    IV toradol given and patient pain improving.   
Refill: Brisk,3 seconds, normal  Peripheral Pulses: +2 palpable, equal bilaterally     Labs:   Recent Labs     04/29/24  1326 04/30/24  1300 05/01/24  0456   WBC 5.6 3.6* 5.2   HGB 8.7* 7.6* 8.5*   HCT 26.6* 23.8* 26.9*   * 79* 82*     Recent Labs     04/30/24 2023 04/30/24  2318 05/01/24  0456    138 138   K 3.3* 3.3* 3.5    100 101   CO2 31 30 28   BUN 6* 7 7   CREATININE 0.6 0.6 0.7   CALCIUM 7.2* 7.6* 7.9*   PHOS  --   --  2.3*     Recent Labs     04/29/24  1326 04/30/24  1300 05/01/24  0456   * 84* 79*   ALT 72* 58* 60*   BILIDIR 0.3  --   --    BILITOT 0.6 0.7 0.6   ALKPHOS 222* 181* 204*     Recent Labs     04/30/24  1300   INR 1.40*     Recent Labs     04/30/24  1300   TROPHS 19*       Urinalysis:      Lab Results   Component Value Date/Time    NITRU Negative 04/30/2024 10:30 PM    WBCUA 454 01/31/2024 06:29 AM    BACTERIA Rare 01/31/2024 06:29 AM    RBCUA 6 01/31/2024 06:29 AM    BLOODU Negative 04/30/2024 10:30 PM    GLUCOSEU Negative 04/30/2024 10:30 PM    GLUCOSEU NEGATIVE 05/25/2010 03:08 PM       Radiology:  CT ABDOMEN PELVIS W IV CONTRAST Additional Contrast? None   Final Result   1. Possible acute pancreatitis; correlate with lipase.   2. Cirrhosis with stigmata of portal hypertension including splenomegaly and   mild ascites.   3. Scattered small and large bowel wall thickening could be due to advanced   liver disease and/or enterocolitis.         CT HEAD WO CONTRAST   Final Result   No acute intracranial abnormality.         CT CERVICAL SPINE WO CONTRAST   Preliminary Result   No acute abnormality of the cervical spine.         XR CHEST 1 VIEW   Final Result   No acute cardiopulmonary process.             IP CONSULT TO SOCIAL WORK  IP CONSULT TO CARDIOLOGY    Assessment/Plan:    Active Hospital Problems    Diagnosis     Near syncope [R55]        Alcohol abuse and alcohol withdrawal, admit to the [facility] protocol thiamine multivitamin Ativan telemetry monitoring.

## 2024-05-05 LAB — BACTERIA BLD CULT ORG #2: NORMAL

## 2024-05-06 ENCOUNTER — TELEPHONE (OUTPATIENT)
Dept: INTERNAL MEDICINE CLINIC | Age: 49
End: 2024-05-06

## 2024-05-15 NOTE — ED NOTES
Pt present to the ED  via ems from home c/o cardiac arrest. Per ems pt was down unresponsive by boyfriend. Pt was shock 3 times , was given epi 4 times. Pt present pulseless with the martell. Pt present with patent Airway. Provider and code team at bedside.

## 2024-05-15 NOTE — ED NOTES
[facility] notified of patients passing, states they will be following her case and to let them know when [place] [facility]

## 2024-05-15 NOTE — PROGRESS NOTES
Pt's family appear to be coping with pt's death. Pt's family were still in pt's room when this  left the ED.      05/15/24 0224   Encounter Summary   Encounter Overview/Reason Spiritual/Emotional Needs;Grief, Loss, and Adjustments   Service Provided For Family   Referral/Consult From Nurse   Support System Children;Family members;Parent   Last Encounter  05/15/24  (support and prayer CL)   Complexity of Encounter High   Begin Time 0110   End Time  0228   Total Time Calculated 78 min   Spiritual/Emotional needs   Type Spiritual Support   Grief, Loss, and Adjustments   Type Death   Assessment/Intervention/Outcome   Assessment Calm;Coping;Sad;Tearful   Intervention Active listening;Discussed illness injury and it’s impact;Explored/Affirmed feelings, thoughts, concerns;Prayer (assurance of)/[place];Grief Care;Sustaining Presence/Ministry of presence;Discussed belief system/Spiritism practices/amador

## 2024-05-15 NOTE — ED NOTES
Notified [facility] of patients passing, states they will not be following her case. Referral number [id number].

## 2024-05-15 NOTE — PROGRESS NOTES
[facility]    Respiratory Care   Intubation Assessment        Name:  Roseann Manzanares  Medical Record Number:  [id number]  Age: 48 y.o.   Gender: female  : 1975  Today's Date:  2024  Room:  [address] [place] [address]      Assessment        Patient Admission Diagnosis        Allergies  Not on File     There were no vitals taken for this visit.    There is no problem list on file for this patient.        Social History         Patient was intubated for Respiratory Failure by  Dr Looney  without difficulties  A 7.5 Endotracheal tube was visualized passing thru the vocal cords via glidescope and was secured at 232 cm at the Lip line. The ET tube was placed on the 1st attempt.  Tube placement was confirmed by CO2 detector, ETCO2, and bilateral breath sounds.      Patient/caregiver was educated on the intubation process:  No:       Level of patient/caregiver understanding able to:   [] Verbalize understanding   [] Demonstrate understanding       [] Teach back        [] Needs reinforcement        []  No available caregiver               []  Other:     Response to education:  N/A       Time Spent in Procedure:  0  minutes       Electronically signed by Rahat Lo RCP on 2024 at 9:21 PM    Upper Endoscopy and Colonoscopy   WHAT YOU NEED TO KNOW:   An upper endoscopy is also called an upper gastrointestinal (GI) endoscopy, or an esophagogastroduodenoscopy (EGD)  It is a procedure to examine the inside of your esophagus, stomach, and duodenum (first part of the small intestine) with a scope  You may feel bloated, gassy, or have some abdominal discomfort after your procedure  Your throat may be sore for 24 to 36 hours  You may burp or pass gas from air that is still inside your body  A colonoscopy is a procedure to examine the inside of your colon (intestine) with a scope  Polyps or tissue growths may have been removed during your colonoscopy  It is normal to feel bloated and to have some abdominal discomfort  You should be passing gas  If you have hemorrhoids or you had polyps removed, you may have a small amount of bleeding  DISCHARGE INSTRUCTIONS:   Seek care immediately if:   · You have sudden, severe abdominal pain  · You have problems swallowing  · You have a large amount of black, sticky bowel movements or blood in your bowel movements  · You have sudden trouble breathing  · You feel weak, lightheaded, or faint or your heart beats faster than normal for you  Contact your healthcare provider if:   · You have a fever and chills  · You have nausea or are vomiting  · Your abdomen is bloated or feels full and hard  · You have abdominal pain  · You have a large amount of black, sticky bowel movements or blood in your bowel movements  · You have not had a bowel movement for 3 days after your procedure  · You have rash or hives  · You have questions or concerns about your procedure  Activity:   ·       Do not lift, strain, or run for 24 hours after your procedure  ·       Rest after your procedure  You have been given medicine to relax you  Do not drive or make important decisions until the day after your procedure   Return to your normal activity as directed  ·       Relieve gas and discomfort from bloating by lying on your right side with a heating pad on your abdomen  You may need to take short walks to help the gas move out  Eat small meals until bloating is relieved  Follow up with your healthcare provider as directed: Write down your questions so you remember to ask them during your visits  If you take a blood thinner, please review the specific instructions from your endoscopist about when you should resume it  These can be found in the Recommendation and Your Medication list sections of this After Visit Summary  Gastric Polyps   WHAT YOU NEED TO KNOW:   What are gastric polyps? Gastric polyps are growths that form in the lining of your stomach  They are not cancerous, but certain types of polyps can change into cancer  What puts me at risk for gastric polyps? · Chronic gastritis caused by NSAIDs use or ulcers    · Long-term use of proton pump inhibitor medicines (used to decrease stomach acid)    · An infection in your stomach caused by H  pylori bacteria    What are the symptoms of gastric polyps? You may have no symptoms  Large polyps may cause any of the following:  · Abdominal pain    · Indigestion    · Vomiting after meals or vomiting blood    · Dark or bloody bowel movements    How are gastric polyps diagnosed? Gastric polyps are usually found during an endoscopy for another reason  All or part of the polyp will be removed during the test  Your healthcare provider may also remove tissue from your stomach  The polyps and tissue are sent to the lab for testing  How are gastric polyps treated? Some types of polyps go away on their own  Other types may be removed if they are large, you have symptoms, or abnormal cells are found  Large polyps and abnormal cells increase your risk for cancer  You may also need antibiotics if you have an infection caused by H  pylori bacteria   Part of your stomach may be removed if the polyps cannot be removed and abnormal cells are found  When should I seek immediate care? · You have blood in your vomit  · You have dark or bloody bowel movements  · You have severe pain in your abdomen that does not go away after you take medicine  When should I contact my healthcare provider? · You have indigestion that does not go away with treatment  · You vomit after meals  · You have questions or concerns about your condition or care  CARE AGREEMENT:   You have the right to help plan your care  Learn about your health condition and how it may be treated  Discuss treatment options with your healthcare providers to decide what care you want to receive  You always have the right to refuse treatment  The above information is an  only  It is not intended as medical advice for individual conditions or treatments  Talk to your doctor, nurse or pharmacist before following any medical regimen to see if it is safe and effective for you  © Copyright [address] Information is for End User's use only and may not be sold, redistributed or otherwise used for commercial purposes  All illustrations and images included in CareNotes® are the copyrighted property of [employer]  or [address] [place] [address]  Gastroesophageal Reflux Disease   WHAT YOU NEED TO KNOW:   What is gastroesophageal reflux disease (GERD)? GERD is reflux that occurs more than twice a week for a few weeks  Reflux means acid and food in the stomach back up into the esophagus  It usually causes heartburn and other symptoms  GERD can cause other health problems over time if it is not treated  What causes GERD? GERD often occurs when the lower muscle (sphincter) of the esophagus does not close properly  The sphincter normally opens to let food into the stomach  It then closes to keep food and stomach acid in the stomach   If the sphincter does not close properly, stomach acid and food back up (reflux) into the esophagus  The following may increase your risk for GERD:  · Certain foods such as spicy foods, chocolate, foods that contain caffeine, peppermint, and fried foods    · Hiatal hernia    · Certain medicines such as calcium channel blockers (used to treat high blood pressure), allergy medicines, sedatives, or antidepressants    · Pregnancy or obesity    · Lying down after a meal    · Drinking alcohol or smoking    What are the signs and symptoms of GERD? · Heartburn (burning pain in your chest)    · Pain after meals that spreads to your neck, jaw, or shoulder    · Pain that gets better when you change positions    · Bitter or acid taste in your mouth    · A dry cough    · Trouble swallowing or pain with swallowing    · Hoarseness or a sore throat    · Burping or hiccups    · Feeling full soon after you start eating    How is GERD diagnosed? Your healthcare provider will ask about your symptoms and when they started  Tell him or her about other medical conditions you have, your eating habits, and your activities  You may also need any of the following:  · The amount of acid  in your esophagus and stomach may be checked  A small probe is used to check the amount  · An endoscopy  is a procedure used to look at the inside of your esophagus and stomach  An endoscope is a bendable tube with a light and camera on the end  Your healthcare provider may remove a small sample of tissue and send it to a lab for tests  · X-ray  pictures may be taken of your stomach and intestines (bowel)  You may be given a chalky liquid to drink before the pictures are taken  This liquid helps your stomach and intestines show up better on the x-rays  · Pressure and function  tests of your esophagus can help find problems such as a hiatal hernia  How is GERD treated? · Medicines  are used to decrease stomach acid   Medicine may also be used to help your lower esophageal sphincter and stomach contract (tighten) more     · Surgery  is done to wrap the upper part of the stomach around the esophageal sphincter  This will strengthen the sphincter and prevent reflux  How can I manage GERD? · Do not have foods or drinks that may increase heartburn  These include chocolate, peppermint, fried or fatty foods, drinks that contain caffeine, or carbonated drinks (soda)  Other foods include spicy foods, onions, tomatoes, and tomato-based foods  Do not have foods or drinks that can irritate your esophagus, such as citrus fruits, juices, and alcohol  · Do not eat large meals  When you eat a lot of food at one time, your stomach needs more acid to digest it  Eat 6 small meals each day instead of 3 large ones, and eat slowly  Do not eat meals 2 to 3 hours before bedtime  · Elevate the head of your bed  Place 6-inch blocks under the head of your bed frame  You may also use more than one pillow under your head and shoulders while you sleep  · Maintain a healthy weight  If you are overweight, weight loss may help relieve symptoms of GERD  · Do not smoke  Smoking weakens the lower esophageal sphincter and increases the risk of GERD  Ask your healthcare provider for information if you currently smoke and need help to quit  E-cigarettes or smokeless tobacco still contain nicotine  Talk to your healthcare provider before you use these products  · Do not wear clothing that is tight around your waist   Tight clothing can put pressure on your stomach and cause or worsen GERD symptoms  Call your local emergency number (911 in the [address]) if:   · You have severe chest pain and sudden trouble breathing  When should I seek immediate care? · You have trouble breathing after you vomit  · You have trouble swallowing, or pain with swallowing  · Your bowel movements are black, bloody, or tarry-looking  · Your vomit looks like coffee grounds or has blood in it  When should I call my doctor?    · You feel full and cannot burp or vomit  · You vomit large amounts, or you vomit often  · You are losing weight without trying  · Your symptoms get worse or do not improve with treatment  · You have questions or concerns about your condition or care  CARE AGREEMENT:   You have the right to help plan your care  Learn about your health condition and how it may be treated  Discuss treatment options with your healthcare providers to decide what care you want to receive  You always have the right to refuse treatment  The above information is an  only  It is not intended as medical advice for individual conditions or treatments  Talk to your doctor, nurse or pharmacist before following any medical regimen to see if it is safe and effective for you  © Copyright [address] Information is for End User's use only and may not be sold, redistributed or otherwise used for commercial purposes  All illustrations and images included in CareNotes® are the copyrighted property of [employer]  or [facility]   Hiatal Hernia   WHAT YOU NEED TO KNOW:   What is a hiatal hernia? A hiatal hernia is a condition that causes part of your stomach to bulge through the hiatus (small opening) in your diaphragm  The part of the stomach may move up and down, or it may get trapped above the diaphragm  What increases my risk for a hiatal hernia? The exact cause of a hiatal hernia is not known  You may have been born with a large hiatus  The following may increase your risk of a hiatal hernia:  · Obesity    · Older age    · Medical conditions such as diverticulosis or esophagitis    · Previous surgery of the esophagus or stomach or trauma such as from a motor vehicle accident    What are the types of hiatal hernia? · Type I (sliding hiatal hernia): A portion of the stomach slides in and out of the hiatus  This type is the most common and usually causes gastroesophageal reflux disease (GERD)   GERD occurs when the esophageal sphincter does not close properly and causes acid reflux  The esophageal sphincter is the lower muscle of the esophagus  · Type II (paraesophageal hiatal hernia):  Type II hiatal hernia forms when a part of the stomach squeezes through the hiatus and lies next to the esophagus  · Type III (combined):  Type III hiatal hernia is a combination of a sliding and a paraesophageal hiatal hernia  · Type IV (complex paraesophageal hiatal hernia): The whole stomach, the small and large bowels, spleen, pancreas, or liver is pushed up into the chest     What are the signs and symptoms of a hiatal hernia? The most common symptom is heartburn  This usually occurs after meals and spreads to your neck, jaw, or shoulder  You may have no signs or symptoms, or you may have any of the following:  · Abdominal pain, especially in the area just above your navel    · Bitter or acid taste in your mouth    · Trouble swallowing    · Coughing or hoarseness    · Chest pain or shortness of breath that occurs after eating    · Frequent burping or hiccups    · Uncomfortable feeling of fullness after eating    How is a hiatal hernia diagnosed? · An upper GI series test  includes x-rays of your esophagus, stomach, and your small intestines  It is also called a barium swallow test  You will be given barium (a chalky liquid) to drink before the pictures are taken  This liquid helps your stomach and intestines show up better on the x-rays  An upper GI series can show if you have an ulcer, a blocked intestine, or other problems  · An endoscopy  uses a scope to see the inside of your digestive tract  A scope is a long, bendable tube with a light on the end of it  A camera may be hooked to the scope to take pictures  How is a hiatal hernia treated? Treatment depends on the type of hiatal hernia you have and on your symptoms  You may not need any treatment   You may need any of the following:  · Medicines  may be given to relieve heartburn symptoms  These medicines help to decrease or block stomach acid  You may also be given medicines that help to tighten the esophageal sphincter  · Surgery  may be done when medicines cannot control your symptoms, or other problems are present  Your healthcare provider may also suggest surgery depending on the type of hernia you have  Your healthcare provider can put your stomach back into its normal location  He may make the hiatus (hole) smaller and anchor your stomach in your abdomen  Fundoplication is a surgery that wraps the upper part of the stomach around the esophageal sphincter to strengthen it  How can I manage symptoms? The following nutrition and lifestyle changes may be recommended to relieve symptoms of heartburn  · Avoid foods that make your symptoms worse  These may include spicy foods, fruit juices, alcohol, caffeine, chocolate, and mint  · Eat several small meals during the day  Small meals give your stomach less food to digest     · Avoid lying down and bending forward after you eat  Do not eat meals 2 to 3 hours before bedtime  This decreases your risk for reflux  · Maintain a healthy weight  If you are overweight, weight loss may help relieve your symptoms  · Sleep with your head elevated  at least 6 inches  · Do not smoke  Smoking can increase your symptoms of heartburn  When should I seek immediate care? · You have severe abdominal pain  · You try to vomit but nothing comes out (retching)  · You have severe chest pain and sudden trouble breathing  · Your bowel movements are black or bloody  · Your vomit looks like coffee grounds or has blood in it  When should I contact my healthcare provider? · Your symptoms are getting worse  · You have nausea, and you are vomiting  · You are losing weight without trying  · You have questions or concerns about your condition or care      CARE AGREEMENT:   You have the right to help plan your care  Learn about your health condition and how it may be treated  Discuss treatment options with your healthcare providers to decide what care you want to receive  You always have the right to refuse treatment  The above information is an  only  It is not intended as medical advice for individual conditions or treatments  Talk to your doctor, nurse or pharmacist before following any medical regimen to see if it is safe and effective for you  © Copyright [address] Information is for End User's use only and may not be sold, redistributed or otherwise used for commercial purposes  All illustrations and images included in CareNotes® are the copyrighted property of [employer]  or [employer] [facility] Fiber Diet   WHAT YOU NEED TO KNOW:   What is a high-fiber diet? A high-fiber diet includes foods that have a high amount of fiber  Fiber is the part of fruits, vegetables, and grains that is not broken down by your body  Fiber keeps your bowel movements regular  Fiber can also help lower your cholesterol level, control blood sugar in people with diabetes, and relieve constipation  Fiber can also help you control your weight because it helps you feel full faster  Most adults should eat 25 to 35 grams of fiber each day  Talk to your dietitian or healthcare provider about the amount of fiber you need  What foods are good sources of fiber? · Foods with at least 4 grams of fiber per serving:      ? ? to ½ cup of high-fiber cereal (check the nutrition label on the box)    ? ½ cup of blackberries or raspberries    ? 4 dried prunes    ? 1 cooked artichoke    ? ½ cup of cooked legumes, such as lentils, or red, kidney, and ba beans    · Foods with 1 to 3 grams of fiber per serving:      ? 1 slice of whole-wheat, pumpernickel, or rye bread    ? ½ cup of cooked brown rice    ? 4 whole-wheat crackers    ?  1 cup of oatmeal    ? ½ cup of cereal with 1 to 3 grams of fiber per serving (check the nutrition label on the box)    ? 1 small piece of fruit, such as an apple, banana, pear, kiwi, or orange    ? 3 dates    ? ½ cup of canned apricots, fruit cocktail, peaches, or pears    ? ½ cup of raw or cooked vegetables, such as carrots, cauliflower, cabbage, spinach, squash, or corn  What are some ways that I can increase fiber in my diet? · Choose brown or wild rice instead of white rice  · Use whole wheat flour in recipes instead of white or all-purpose flour  · Add beans and peas to casseroles or soups  · Choose fresh fruit and vegetables with peels or skins on instead of juices  What other guidelines should I follow? · Add fiber to your diet slowly  You may have abdominal discomfort, bloating, and gas if you add fiber to your diet too quickly  · Drink plenty of liquids as you add fiber to your diet  You may have nausea or develop constipation if you do not drink enough water  Ask how much liquid to drink each day and which liquids are best for you  CARE AGREEMENT:   You have the right to help plan your care  Discuss treatment options with your healthcare provider to decide what care you want to receive  You always have the right to refuse treatment  The above information is an  only  It is not intended as medical advice for individual conditions or treatments  Talk to your doctor, nurse or pharmacist before following any medical regimen to see if it is safe and effective for you  © Copyright [address] Information is for End User's use only and may not be sold, redistributed or otherwise used for commercial purposes  All illustrations and images included in CareNotes® are the copyrighted property of [employer]  or Celso P[employer]rez  Hemorrhoids   WHAT YOU NEED TO KNOW:   What are hemorrhoids? Hemorrhoids are swollen blood vessels inside your rectum (internal hemorrhoids) or on your anus (external hemorrhoids)   Sometimes a hemorrhoid may prolapse  This means it extends out of your anus  What increases my risk for hemorrhoids? · Pregnancy or obesity    · Straining or sitting for a long time during bowel movements    · Liver disease    · Weak muscles around the anus caused by older age, rectal surgery, or anal intercourse    · A lack of physical activity    · Chronic diarrhea or constipation    · A low-fiber diet    What are the signs and symptoms of hemorrhoids? · Pain or itching around your anus or inside your rectum    · Swelling or bumps around your anus    · Bright red blood in your bowel movement, on the toilet paper, or in the toilet bowl    · Tissue bulging out of your anus (prolapsed hemorrhoids)    · Incontinence (poor control over urine or bowel movements)    How are hemorrhoids diagnosed? Your healthcare provider will ask about your symptoms, the foods you eat, and your bowel movements  He or she will examine your anus for external hemorrhoids  You may need the following:  · A digital rectal exam  is a test to check for hemorrhoids  Your healthcare provider will put a gloved finger inside your anus to feel for the hemorrhoids  · An anoscopy  is a test that uses a scope (small tube with a light and camera on the end) to look at your hemorrhoids  How are hemorrhoids treated? Treatment will depend on your symptoms  You may need any of the following:  · Medicines  can help decrease pain and swelling, and soften your bowel movement  The medicine may be a pill, pad, cream, or ointment  · Procedures  may be used to shrink or remove your hemorrhoid  Examples include rubber-band ligation, sclerotherapy, and photocoagulation  These procedures may be done in your healthcare provider's office  Ask your healthcare provider for more information about these procedures  · Surgery  may be needed to shrink or remove your hemorrhoids  How can I manage my symptoms?    · Apply ice on your anus for 15 to 20 minutes every hour or as directed  Use an ice pack, or put crushed ice in a plastic bag  Cover it with a towel before you apply it to your anus  Ice helps prevent tissue damage and decreases swelling and pain  · Take a sitz bath  Fill a bathtub with 4 to 6 inches of warm water  You may also use a sitz bath pan that fits inside a toilet bowl  Sit in the sitz bath for 15 minutes  Do this 3 times a day, and after each bowel movement  The warm water can help decrease pain and swelling  · Keep your anal area clean  Gently wash the area with warm water daily  Soap may irritate the area  After a bowel movement, wipe with moist towelettes or wet toilet paper  Dry toilet paper can irritate the area  How can I help prevent hemorrhoids? · Do not strain to have a bowel movement  Do not sit on the toilet too long  These actions can increase pressure on the tissues in your rectum and anus  · Drink plenty of liquids  Liquids can help prevent constipation  Ask how much liquid to drink each day and which liquids are best for you  · Eat a variety of high-fiber foods  Examples include fruits, vegetables, and whole grains  Ask your healthcare provider how much fiber you need each day  You may need to take a fiber supplement  · Exercise as directed  Exercise, such as walking, may make it easier to have a bowel movement  Ask your healthcare provider to help you create an exercise plan  · Do not have anal sex  Anal sex can weaken the skin around your rectum and anus  · Avoid heavy lifting  This can cause straining and increase your risk for another hemorrhoid  When should I seek immediate care? · You have severe pain in your rectum or around your anus  · You have severe pain in your abdomen and you are vomiting  · You have bleeding from your anus that soaks through your underwear  When should I contact my healthcare provider? · You have frequent and painful bowel movements      · Your hemorrhoid looks or feels more swollen than usual      · You do not have a bowel movement for 2 days or more  · You see or feel tissue coming through your anus  · You have questions or concerns about your condition or care  CARE AGREEMENT:   You have the right to help plan your care  Learn about your health condition and how it may be treated  Discuss treatment options with your healthcare providers to decide what care you want to receive  You always have the right to refuse treatment  The above information is an  only  It is not intended as medical advice for individual conditions or treatments  Talk to your doctor, nurse or pharmacist before following any medical regimen to see if it is safe and effective for you  © Copyright [address] Information is for End User's use only and may not be sold, redistributed or otherwise used for commercial purposes  All illustrations and images included in CareNotes® are the copyrighted property of [employer]  or [facility] [place]   Diverticulosis   WHAT YOU NEED TO KNOW:   What is diverticulosis? Diverticulosis is a condition that causes small pockets called diverticula to form in your intestine  These pockets make it difficult for bowel movements to pass through your digestive system  What causes diverticulosis? Diverticula form when muscles have to work hard to move bowel movements through the intestine  The force causes bulges to form at weak areas in the intestine  This may happen if you eat foods that are low in fiber  Fiber helps give your bowel movements more bulk so they are larger and easier to move through your colon  The following may increase your risk of diverticulosis:  · A history of constipation    · Age 36 or older    · Obesity    · Lack of exercise    What are the signs and symptoms of diverticulosis? Diverticulosis usually does not cause any signs or symptoms   It may cause any of the following in some people:  · Pain or discomfort in your lower abdomen    · Abdominal bloating    · Constipation or diarrhea    How is diverticulosis diagnosed? Your healthcare provider will examine you and ask about your bowel movements, diet, and symptoms  He or she will also ask about any medical conditions you have or medicines you take  You may need any of the following:  · Blood tests  may be done to check for signs of inflammation  · A barium enema  is an x-ray of your colon that may show diverticula  A tube is put into your anus, and a liquid called barium is put through the tube  Barium is used so that healthcare providers can see your colon more clearly  · Flexible sigmoidoscopy  is a test to look for any changes in your lower intestines and rectum  It may also show the cause of any bleeding or pain  A soft, bendable tube with a light on the end will be put into your anus  It will then be moved forward into your intestine  · A colonoscopy  is used to look at your whole colon  A scope (long bendable tube with a light on the end) is used to take pictures  This test may show diverticula  · A CT scan , or CAT scan, may show diverticula  You may be given contrast liquid before the scan  Tell the healthcare provider if you have ever had an allergic reaction to contrast liquid  How is diverticulosis managed? The goal of treatment is to manage any symptoms you have and prevent other problems such as diverticulitis  Diverticulitis is swelling or infection of the diverticula  Your healthcare provider may recommend any of the following:  · Eat a variety of high-fiber foods  High-fiber foods help you have regular bowel movements  High-fiber foods include cooked beans, fruits, vegetables, and some cereals  Most adults need 25 to 35 grams of fiber each day  Your healthcare provider may recommend that you have more  Ask your healthcare provider how much fiber you need  Increase fiber slowly   You may have abdominal discomfort, bloating, and gas if you add fiber to your diet too quickly  You may need to take a fiber supplement if you are not getting enough fiber from food  · Medicines  to soften your bowel movements may be given  You may also need medicines to treat symptoms such as bloating and pain  · Drink liquids as directed  You may need to drink 2 to 3 liters (8 to 12 cups) of liquids every day  Ask your healthcare provider how much liquid to drink each day and which liquids are best for you  · Apply heat  on your abdomen for 20 to 30 minutes every 2 hours for as many days as directed  Heat helps decrease pain and muscle spasms  How can I help prevent diverticulitis or other symptoms? The following may help decrease your risk for diverticulitis or symptoms, such as bleeding  Talk to your provider about these or other things you can do to prevent problems that may occur with diverticulosis  · Exercise regularly  Ask your healthcare provider about the best exercise plan for you  Exercise can help you have regular bowel movements  Get 30 minutes of exercise on most days of the week  · Maintain a healthy weight  Ask your healthcare provider how much you should weigh  Ask him or her to help you create a weight loss plan if you are overweight  · Do not smoke  Nicotine and other chemicals in cigarettes increase your risk for diverticulitis  Ask your healthcare provider for information if you currently smoke and need help to quit  E-cigarettes or smokeless tobacco still contain nicotine  Talk to your healthcare provider before you use these products  · Ask your healthcare provider if it is safe to take NSAIDs  NSAIDs may increase your risk of diverticulitis  When should I seek immediate care? · You have severe pain on the left side of your lower abdomen  · Your bowel movements are bright or dark red  When should I contact my healthcare provider? · You have a fever and chills  · You feel dizzy or lightheaded  · You have nausea, or you are vomiting  · You have a change in your bowel movements  · You have questions or concerns about your condition or care  CARE AGREEMENT:   You have the right to help plan your care  Learn about your health condition and how it may be treated  Discuss treatment options with your healthcare providers to decide what care you want to receive  You always have the right to refuse treatment  The above information is an  only  It is not intended as medical advice for individual conditions or treatments  Talk to your doctor, nurse or pharmacist before following any medical regimen to see if it is safe and effective for you  © Copyright [address] Information is for End User's use only and may not be sold, redistributed or otherwise used for commercial purposes   All illustrations and images included in CareNotes® are the copyrighted property of [employer]  or [address] [place] [address]

## 2024-05-15 NOTE — ED PROVIDER NOTES
PEA/asystole. Continued resuscitation per ACLS protocol. She was intubated with glidescope. Given additional 2mg Narcan. Epi x 3. Bicarb. Calcium. Glc repeated and was 257. She remains in asystole. Time of death called at . I was notified mom was calling into the ER and I spoke with mom with nurse rolando. Mom will drive from [place].     PROCEDURE:  ENDOTRACHEAL INTUBATION  Implied consent. Emergent condition. Roseann Manzanares was pre-oxygenated as best as possible. Suction was ready. The patient was sedated, then paralyzed; please see the chart for the medications and dosages administered. The vocal cords were visualized, and the endotracheal tube was inserted without complication. Tracheal position was confirmed using auscultation, capnometry, tube condensation.. The tube was appropriately secured.     CONSULTS: (Who and What was discussed)  None    Is this patient to be included in the SEP-1 Core Measure due to severe sepsis or septic shock?   No   Exclusion criteria - the patient is NOT to be included for SEP-1 Core Measure due to:           During the patient's ED course, the patient was given:  Medications   EPINEPHrine 1 MG/10ML injection (1 mg IntraVENous Given 24)   sodium bicarbonate 8.4 % injection (50 mEq IntraVENous Given 24)   naloxone (NARCAN) injection (0.2 mg IntraVENous Given 24)   calcium chloride 10 % injection (1,000 mg IntraVENous Given 24)            I am the Primary Clinician of Record.    FINAL IMPRESSION      1. Cardiopulmonary arrest (HCC)          DISPOSITION/PLAN     DISPOSITION  05/15/2024 12:12:14 AM      PATIENT REFERRED TO:  No follow-up provider specified.    DISCHARGE MEDICATIONS:  Patient was given scripts for the following medications. I counseled patient how to take these medications:  New Prescriptions    No medications on file       DISCONTINUED MEDICATIONS:  Discontinued Medications    No medications on file

## (undated) DEVICE — BITE BLOCK ENDOSCP AD 60 FR W/ ADJ STRP PLAS GRN BLOX

## (undated) DEVICE — ENDOSCOPY KIT: Brand: MEDLINE INDUSTRIES, INC.

## (undated) DEVICE — FORMALIN CLEAR VIAL 20 ML 10%

## (undated) DEVICE — FORCEPS BX 240CM 2.4MM L NDL RAD JAW 4 M00513334